# Patient Record
Sex: FEMALE | Race: ASIAN | NOT HISPANIC OR LATINO | URBAN - METROPOLITAN AREA
[De-identification: names, ages, dates, MRNs, and addresses within clinical notes are randomized per-mention and may not be internally consistent; named-entity substitution may affect disease eponyms.]

---

## 2019-10-01 ENCOUNTER — OUTPATIENT (OUTPATIENT)
Dept: OUTPATIENT SERVICES | Facility: HOSPITAL | Age: 64
LOS: 1 days | End: 2019-10-01
Payer: MEDICAID

## 2019-10-20 ENCOUNTER — INPATIENT (INPATIENT)
Facility: HOSPITAL | Age: 64
LOS: 18 days | Discharge: HOME | DRG: 216 | End: 2019-11-08
Attending: THORACIC SURGERY (CARDIOTHORACIC VASCULAR SURGERY) | Admitting: HOSPITALIST
Payer: MEDICAID

## 2019-10-20 VITALS
HEART RATE: 120 BPM | DIASTOLIC BLOOD PRESSURE: 97 MMHG | SYSTOLIC BLOOD PRESSURE: 157 MMHG | OXYGEN SATURATION: 97 % | RESPIRATION RATE: 20 BRPM

## 2019-10-20 DIAGNOSIS — E11.9 TYPE 2 DIABETES MELLITUS WITHOUT COMPLICATIONS: ICD-10-CM

## 2019-10-20 DIAGNOSIS — I10 ESSENTIAL (PRIMARY) HYPERTENSION: ICD-10-CM

## 2019-10-20 DIAGNOSIS — I21.4 NON-ST ELEVATION (NSTEMI) MYOCARDIAL INFARCTION: ICD-10-CM

## 2019-10-20 DIAGNOSIS — I63.9 CEREBRAL INFARCTION, UNSPECIFIED: ICD-10-CM

## 2019-10-20 DIAGNOSIS — Z79.899 OTHER LONG TERM (CURRENT) DRUG THERAPY: ICD-10-CM

## 2019-10-20 DIAGNOSIS — E78.5 HYPERLIPIDEMIA, UNSPECIFIED: ICD-10-CM

## 2019-10-20 DIAGNOSIS — Z29.9 ENCOUNTER FOR PROPHYLACTIC MEASURES, UNSPECIFIED: ICD-10-CM

## 2019-10-20 LAB
ALBUMIN SERPL ELPH-MCNC: 3.5 G/DL — SIGNIFICANT CHANGE UP (ref 3.3–5)
ALP SERPL-CCNC: 85 U/L — SIGNIFICANT CHANGE UP (ref 40–120)
ALT FLD-CCNC: 22 U/L — SIGNIFICANT CHANGE UP (ref 10–45)
ANION GAP SERPL CALC-SCNC: 16 MMOL/L — SIGNIFICANT CHANGE UP (ref 5–17)
ANION GAP SERPL CALC-SCNC: 16 MMOL/L — SIGNIFICANT CHANGE UP (ref 5–17)
APTT BLD: 35.3 SEC — SIGNIFICANT CHANGE UP (ref 27.5–36.3)
APTT BLD: 57.4 SEC — HIGH (ref 27.5–36.3)
APTT BLD: 65.9 SEC — HIGH (ref 27.5–36.3)
APTT BLD: 69.8 SEC — HIGH (ref 27.5–36.3)
AST SERPL-CCNC: 46 U/L — HIGH (ref 10–40)
BASE EXCESS BLDV CALC-SCNC: 0.1 MMOL/L — SIGNIFICANT CHANGE UP (ref -2–2)
BASOPHILS # BLD AUTO: 0.04 K/UL — SIGNIFICANT CHANGE UP (ref 0–0.2)
BASOPHILS NFR BLD AUTO: 0.4 % — SIGNIFICANT CHANGE UP (ref 0–2)
BILIRUB SERPL-MCNC: 0.4 MG/DL — SIGNIFICANT CHANGE UP (ref 0.2–1.2)
BUN SERPL-MCNC: 12 MG/DL — SIGNIFICANT CHANGE UP (ref 7–23)
BUN SERPL-MCNC: 13 MG/DL — SIGNIFICANT CHANGE UP (ref 7–23)
CA-I SERPL-SCNC: 1.07 MMOL/L — LOW (ref 1.12–1.3)
CALCIUM SERPL-MCNC: 8.4 MG/DL — SIGNIFICANT CHANGE UP (ref 8.4–10.5)
CALCIUM SERPL-MCNC: 8.9 MG/DL — SIGNIFICANT CHANGE UP (ref 8.4–10.5)
CHLORIDE BLDV-SCNC: 107 MMOL/L — SIGNIFICANT CHANGE UP (ref 96–108)
CHLORIDE SERPL-SCNC: 102 MMOL/L — SIGNIFICANT CHANGE UP (ref 96–108)
CHLORIDE SERPL-SCNC: 106 MMOL/L — SIGNIFICANT CHANGE UP (ref 96–108)
CK MB BLD-MCNC: 7.4 % — HIGH (ref 0–3.5)
CK MB CFR SERPL CALC: 13.1 NG/ML — HIGH (ref 0–3.8)
CK SERPL-CCNC: 176 U/L — HIGH (ref 25–170)
CO2 BLDV-SCNC: 24 MMOL/L — SIGNIFICANT CHANGE UP (ref 22–30)
CO2 SERPL-SCNC: 20 MMOL/L — LOW (ref 22–31)
CO2 SERPL-SCNC: 21 MMOL/L — LOW (ref 22–31)
CREAT SERPL-MCNC: 0.6 MG/DL — SIGNIFICANT CHANGE UP (ref 0.5–1.3)
CREAT SERPL-MCNC: 0.68 MG/DL — SIGNIFICANT CHANGE UP (ref 0.5–1.3)
EOSINOPHIL # BLD AUTO: 0.11 K/UL — SIGNIFICANT CHANGE UP (ref 0–0.5)
EOSINOPHIL NFR BLD AUTO: 1 % — SIGNIFICANT CHANGE UP (ref 0–6)
GAS PNL BLDV: 136 MMOL/L — SIGNIFICANT CHANGE UP (ref 135–145)
GAS PNL BLDV: SIGNIFICANT CHANGE UP
GAS PNL BLDV: SIGNIFICANT CHANGE UP
GLUCOSE BLDC GLUCOMTR-MCNC: 136 MG/DL — HIGH (ref 70–99)
GLUCOSE BLDC GLUCOMTR-MCNC: 190 MG/DL — HIGH (ref 70–99)
GLUCOSE BLDC GLUCOMTR-MCNC: 216 MG/DL — HIGH (ref 70–99)
GLUCOSE BLDC GLUCOMTR-MCNC: 219 MG/DL — HIGH (ref 70–99)
GLUCOSE BLDC GLUCOMTR-MCNC: 90 MG/DL — SIGNIFICANT CHANGE UP (ref 70–99)
GLUCOSE BLDV-MCNC: 201 MG/DL — HIGH (ref 70–99)
GLUCOSE SERPL-MCNC: 164 MG/DL — HIGH (ref 70–99)
GLUCOSE SERPL-MCNC: 208 MG/DL — HIGH (ref 70–99)
HBA1C BLD-MCNC: 7.5 % — HIGH (ref 4–5.6)
HCO3 BLDV-SCNC: 23 MMOL/L — SIGNIFICANT CHANGE UP (ref 21–29)
HCT VFR BLD CALC: 32.6 % — LOW (ref 34.5–45)
HCT VFR BLD CALC: 34.4 % — LOW (ref 34.5–45)
HCT VFR BLD CALC: 35.8 % — SIGNIFICANT CHANGE UP (ref 34.5–45)
HCT VFR BLDA CALC: 37 % — LOW (ref 39–50)
HGB BLD CALC-MCNC: 12 G/DL — SIGNIFICANT CHANGE UP (ref 11.5–15.5)
HGB BLD-MCNC: 10.5 G/DL — LOW (ref 11.5–15.5)
HGB BLD-MCNC: 11.3 G/DL — LOW (ref 11.5–15.5)
HGB BLD-MCNC: 11.5 G/DL — SIGNIFICANT CHANGE UP (ref 11.5–15.5)
IMM GRANULOCYTES NFR BLD AUTO: 0.4 % — SIGNIFICANT CHANGE UP (ref 0–1.5)
INR BLD: 1.19 RATIO — HIGH (ref 0.88–1.16)
LACTATE BLDV-MCNC: 2.5 MMOL/L — HIGH (ref 0.7–2)
LIDOCAIN IGE QN: 31 U/L — SIGNIFICANT CHANGE UP (ref 7–60)
LYMPHOCYTES # BLD AUTO: 2.14 K/UL — SIGNIFICANT CHANGE UP (ref 1–3.3)
LYMPHOCYTES # BLD AUTO: 20.4 % — SIGNIFICANT CHANGE UP (ref 13–44)
MCHC RBC-ENTMCNC: 25.3 PG — LOW (ref 27–34)
MCHC RBC-ENTMCNC: 25.4 PG — LOW (ref 27–34)
MCHC RBC-ENTMCNC: 25.5 PG — LOW (ref 27–34)
MCHC RBC-ENTMCNC: 32.1 GM/DL — SIGNIFICANT CHANGE UP (ref 32–36)
MCHC RBC-ENTMCNC: 32.2 GM/DL — SIGNIFICANT CHANGE UP (ref 32–36)
MCHC RBC-ENTMCNC: 32.8 GM/DL — SIGNIFICANT CHANGE UP (ref 32–36)
MCV RBC AUTO: 77.5 FL — LOW (ref 80–100)
MCV RBC AUTO: 78.7 FL — LOW (ref 80–100)
MCV RBC AUTO: 78.7 FL — LOW (ref 80–100)
MONOCYTES # BLD AUTO: 0.57 K/UL — SIGNIFICANT CHANGE UP (ref 0–0.9)
MONOCYTES NFR BLD AUTO: 5.4 % — SIGNIFICANT CHANGE UP (ref 2–14)
NEUTROPHILS # BLD AUTO: 7.61 K/UL — HIGH (ref 1.8–7.4)
NEUTROPHILS NFR BLD AUTO: 72.4 % — SIGNIFICANT CHANGE UP (ref 43–77)
NRBC # BLD: 0 /100 WBCS — SIGNIFICANT CHANGE UP (ref 0–0)
NT-PROBNP SERPL-SCNC: 5799 PG/ML — HIGH (ref 0–300)
PCO2 BLDV: 35 MMHG — SIGNIFICANT CHANGE UP (ref 35–50)
PH BLDV: 7.44 — SIGNIFICANT CHANGE UP (ref 7.35–7.45)
PLATELET # BLD AUTO: 389 K/UL — SIGNIFICANT CHANGE UP (ref 150–400)
PLATELET # BLD AUTO: 403 K/UL — HIGH (ref 150–400)
PLATELET # BLD AUTO: 419 K/UL — HIGH (ref 150–400)
PO2 BLDV: 31 MMHG — SIGNIFICANT CHANGE UP (ref 25–45)
POTASSIUM BLDV-SCNC: 5.8 MMOL/L — HIGH (ref 3.5–5.3)
POTASSIUM SERPL-MCNC: 4 MMOL/L — SIGNIFICANT CHANGE UP (ref 3.5–5.3)
POTASSIUM SERPL-MCNC: 5.6 MMOL/L — HIGH (ref 3.5–5.3)
POTASSIUM SERPL-SCNC: 4 MMOL/L — SIGNIFICANT CHANGE UP (ref 3.5–5.3)
POTASSIUM SERPL-SCNC: 5.6 MMOL/L — HIGH (ref 3.5–5.3)
PROT SERPL-MCNC: 7.7 G/DL — SIGNIFICANT CHANGE UP (ref 6–8.3)
PROTHROM AB SERPL-ACNC: 13.6 SEC — HIGH (ref 10–12.9)
RBC # BLD: 4.14 M/UL — SIGNIFICANT CHANGE UP (ref 3.8–5.2)
RBC # BLD: 4.44 M/UL — SIGNIFICANT CHANGE UP (ref 3.8–5.2)
RBC # BLD: 4.55 M/UL — SIGNIFICANT CHANGE UP (ref 3.8–5.2)
RBC # FLD: 13.7 % — SIGNIFICANT CHANGE UP (ref 10.3–14.5)
RBC # FLD: 13.7 % — SIGNIFICANT CHANGE UP (ref 10.3–14.5)
RBC # FLD: 13.8 % — SIGNIFICANT CHANGE UP (ref 10.3–14.5)
SAO2 % BLDV: 53 % — LOW (ref 67–88)
SODIUM SERPL-SCNC: 139 MMOL/L — SIGNIFICANT CHANGE UP (ref 135–145)
SODIUM SERPL-SCNC: 142 MMOL/L — SIGNIFICANT CHANGE UP (ref 135–145)
TROPONIN T, HIGH SENSITIVITY RESULT: 1196 NG/L — HIGH (ref 0–51)
TROPONIN T, HIGH SENSITIVITY RESULT: 1340 NG/L — HIGH (ref 0–51)
WBC # BLD: 10.51 K/UL — HIGH (ref 3.8–10.5)
WBC # BLD: 9.29 K/UL — SIGNIFICANT CHANGE UP (ref 3.8–10.5)
WBC # BLD: 9.68 K/UL — SIGNIFICANT CHANGE UP (ref 3.8–10.5)
WBC # FLD AUTO: 10.51 K/UL — HIGH (ref 3.8–10.5)
WBC # FLD AUTO: 9.29 K/UL — SIGNIFICANT CHANGE UP (ref 3.8–10.5)
WBC # FLD AUTO: 9.68 K/UL — SIGNIFICANT CHANGE UP (ref 3.8–10.5)

## 2019-10-20 PROCEDURE — 99223 1ST HOSP IP/OBS HIGH 75: CPT | Mod: GC

## 2019-10-20 PROCEDURE — 71045 X-RAY EXAM CHEST 1 VIEW: CPT | Mod: 26

## 2019-10-20 PROCEDURE — 12345: CPT | Mod: NC,GC

## 2019-10-20 PROCEDURE — 99291 CRITICAL CARE FIRST HOUR: CPT

## 2019-10-20 RX ORDER — INSULIN LISPRO 100/ML
VIAL (ML) SUBCUTANEOUS
Refills: 0 | Status: DISCONTINUED | OUTPATIENT
Start: 2019-10-20 | End: 2019-10-25

## 2019-10-20 RX ORDER — GLUCAGON INJECTION, SOLUTION 0.5 MG/.1ML
1 INJECTION, SOLUTION SUBCUTANEOUS ONCE
Refills: 0 | Status: DISCONTINUED | OUTPATIENT
Start: 2019-10-20 | End: 2019-10-25

## 2019-10-20 RX ORDER — ATORVASTATIN CALCIUM 80 MG/1
80 TABLET, FILM COATED ORAL AT BEDTIME
Refills: 0 | Status: DISCONTINUED | OUTPATIENT
Start: 2019-10-20 | End: 2019-10-25

## 2019-10-20 RX ORDER — INSULIN LISPRO 100/ML
VIAL (ML) SUBCUTANEOUS AT BEDTIME
Refills: 0 | Status: DISCONTINUED | OUTPATIENT
Start: 2019-10-20 | End: 2019-10-25

## 2019-10-20 RX ORDER — HEPARIN SODIUM 5000 [USP'U]/ML
3800 INJECTION INTRAVENOUS; SUBCUTANEOUS EVERY 6 HOURS
Refills: 0 | Status: DISCONTINUED | OUTPATIENT
Start: 2019-10-20 | End: 2019-10-21

## 2019-10-20 RX ORDER — TICAGRELOR 90 MG/1
90 TABLET ORAL EVERY 12 HOURS
Refills: 0 | Status: DISCONTINUED | OUTPATIENT
Start: 2019-10-20 | End: 2019-10-22

## 2019-10-20 RX ORDER — SODIUM CHLORIDE 9 MG/ML
1000 INJECTION, SOLUTION INTRAVENOUS
Refills: 0 | Status: DISCONTINUED | OUTPATIENT
Start: 2019-10-20 | End: 2019-10-25

## 2019-10-20 RX ORDER — DEXTROSE 50 % IN WATER 50 %
25 SYRINGE (ML) INTRAVENOUS ONCE
Refills: 0 | Status: DISCONTINUED | OUTPATIENT
Start: 2019-10-20 | End: 2019-10-25

## 2019-10-20 RX ORDER — DEXTROSE 50 % IN WATER 50 %
12.5 SYRINGE (ML) INTRAVENOUS ONCE
Refills: 0 | Status: DISCONTINUED | OUTPATIENT
Start: 2019-10-20 | End: 2019-10-25

## 2019-10-20 RX ORDER — ACETAMINOPHEN 500 MG
650 TABLET ORAL ONCE
Refills: 0 | Status: COMPLETED | OUTPATIENT
Start: 2019-10-20 | End: 2019-10-20

## 2019-10-20 RX ORDER — NITROGLYCERIN 6.5 MG
25 CAPSULE, EXTENDED RELEASE ORAL
Qty: 50 | Refills: 0 | Status: DISCONTINUED | OUTPATIENT
Start: 2019-10-20 | End: 2019-10-20

## 2019-10-20 RX ORDER — ASPIRIN/CALCIUM CARB/MAGNESIUM 324 MG
81 TABLET ORAL DAILY
Refills: 0 | Status: DISCONTINUED | OUTPATIENT
Start: 2019-10-20 | End: 2019-10-25

## 2019-10-20 RX ORDER — HEPARIN SODIUM 5000 [USP'U]/ML
INJECTION INTRAVENOUS; SUBCUTANEOUS
Qty: 25000 | Refills: 0 | Status: DISCONTINUED | OUTPATIENT
Start: 2019-10-20 | End: 2019-10-21

## 2019-10-20 RX ORDER — DEXTROSE 50 % IN WATER 50 %
15 SYRINGE (ML) INTRAVENOUS ONCE
Refills: 0 | Status: DISCONTINUED | OUTPATIENT
Start: 2019-10-20 | End: 2019-10-25

## 2019-10-20 RX ORDER — LISINOPRIL 2.5 MG/1
10 TABLET ORAL DAILY
Refills: 0 | Status: DISCONTINUED | OUTPATIENT
Start: 2019-10-20 | End: 2019-10-24

## 2019-10-20 RX ORDER — INFLUENZA VIRUS VACCINE 15; 15; 15; 15 UG/.5ML; UG/.5ML; UG/.5ML; UG/.5ML
0.5 SUSPENSION INTRAMUSCULAR ONCE
Refills: 0 | Status: DISCONTINUED | OUTPATIENT
Start: 2019-10-20 | End: 2019-10-26

## 2019-10-20 RX ADMIN — HEPARIN SODIUM 950 UNIT(S)/HR: 5000 INJECTION INTRAVENOUS; SUBCUTANEOUS at 11:07

## 2019-10-20 RX ADMIN — LISINOPRIL 10 MILLIGRAM(S): 2.5 TABLET ORAL at 08:57

## 2019-10-20 RX ADMIN — HEPARIN SODIUM 750 UNIT(S)/HR: 5000 INJECTION INTRAVENOUS; SUBCUTANEOUS at 03:44

## 2019-10-20 RX ADMIN — Medication 650 MILLIGRAM(S): at 22:31

## 2019-10-20 RX ADMIN — ATORVASTATIN CALCIUM 80 MILLIGRAM(S): 80 TABLET, FILM COATED ORAL at 21:00

## 2019-10-20 RX ADMIN — Medication 81 MILLIGRAM(S): at 11:08

## 2019-10-20 RX ADMIN — TICAGRELOR 90 MILLIGRAM(S): 90 TABLET ORAL at 21:00

## 2019-10-20 RX ADMIN — HEPARIN SODIUM 950 UNIT(S)/HR: 5000 INJECTION INTRAVENOUS; SUBCUTANEOUS at 17:46

## 2019-10-20 RX ADMIN — Medication 2: at 14:03

## 2019-10-20 RX ADMIN — Medication 650 MILLIGRAM(S): at 23:30

## 2019-10-20 RX ADMIN — Medication 15 MICROGRAM(S)/MIN: at 02:06

## 2019-10-20 NOTE — CONSULT NOTE ADULT - SUBJECTIVE AND OBJECTIVE BOX
HISTORY OF PRESENT ILLNESS:    62 YO M HLD, HTN, CVA with no residual deficits, epigastric pain and pressure at approximately 4PM. Pt. was on couch watching TV when symptoms started. Went to outside hospital. Was found to have ?ALAN in III. No hx of orthopnea or changes in exercise tolerance as reported by family. No nausea, vomiting or diaphoresis during episode. Pt. currently asymptomatic. Not in pain or having shortness of breath. in outside ED patinet received ASA, brilinta and heparin. Found to be hypertensive on arrival by EMS for transfer. Started on nitro gtt and transferred for further evaluation. pt. currently on 25mcg/min of nitro with -150 systolic.     Allergies    No Known Allergies    Intolerances    	    MEDICATIONS:  nitroglycerin  Infusion 25 MICROgram(s)/Min IV Continuous <Continuous>                  PAST MEDICAL & SURGICAL HISTORY:      FAMILY HISTORY:      SOCIAL HISTORY:    [x] Non-smoker  [ ] Smoker  [ ] Alcohol      REVIEW OF SYSTEMS:  General: no fatigue/malaise, weight loss/gain.  Skin: no rashes.  Ophthalmologic: no blurred vision, no loss of vision. 	  ENT: no sore throat, rhinorrhea, sinus congestion.  Respiratory: no SOB, cough or wheeze.  Gastrointestinal:  no N/V/D, no melena/hematemesis/hematochezia.  Genitourinary: no dysuria/hesitancy or hematuria.  Musculoskeletal: no myalgias or arthralgias.  Neurological: no changes in vision or hearing, no lightheadedness/dizziness, no syncope/near syncope	  Psychiatric: no unusual stress/anxiety.   Hematology/Lymphatics: no unusual bleeding, bruising and no lymphadenopathy.  Endocrine: no unusual thirst.   All others negative except as stated above and in HPI.    PHYSICAL EXAM:  T(C): 36.1 (10-20-19 @ 01:27), Max: 36.1 (10-20-19 @ 01:27)  HR: 110 (10-20-19 @ 02:36) (110 - 122)  BP: 130/88 (10-20-19 @ 02:36) (129/95 - 157/97)  RR: 28 (10-20-19 @ 02:36) (20 - 28)  SpO2: 100% (10-20-19 @ 02:36) (97% - 100%) on RA.   Wt(kg): --  I&O's Summary      Appearance: Normal	  HEENT:   Normal oral mucosa, PERRL, EOMI	  Lymphatic: No lymphadenopathy  Cardiovascular: Tachyardic, S1 S2, No JVD, No murmurs, No edema  Respiratory: decreased breath sounds to mid lung fields posteriorly bilaterally. Crackles heard anteriorly.   Psychiatry: A & O x 3, Mood & affect appropriate  Gastrointestinal:  Soft, Non-tender, + BS	  Skin: No rashes, No ecchymoses, No cyanosis	  Neurologic: Non-focal  Extremities: Normal range of motion, No clubbing, cyanosis or edema  Vascular: Peripheral pulses palpable 2+ bilaterally        LABS:	 	    CBC Full  -  ( 20 Oct 2019 01:50 )  WBC Count : 10.51 K/uL  Hemoglobin : 11.5 g/dL  Hematocrit : 35.8 %  Platelet Count - Automated : 419 K/uL  Mean Cell Volume : 78.7 fl  Mean Cell Hemoglobin : 25.3 pg  Mean Cell Hemoglobin Concentration : 32.1 gm/dL  Auto Neutrophil # : 7.61 K/uL  Auto Lymphocyte # : 2.14 K/uL  Auto Monocyte # : 0.57 K/uL  Auto Eosinophil # : 0.11 K/uL  Auto Basophil # : 0.04 K/uL  Auto Neutrophil % : 72.4 %  Auto Lymphocyte % : 20.4 %  Auto Monocyte % : 5.4 %  Auto Eosinophil % : 1.0 %  Auto Basophil % : 0.4 %    10-20    139  |  102  |  13  ----------------------------<  208<H>  5.6<H>   |  21<L>  |  0.60    Ca    8.9      20 Oct 2019 01:50    TPro  7.7  /  Alb  3.5  /  TBili  0.4  /  DBili  x   /  AST  46<H>  /  ALT  22  /  AlkPhos  85  10-20      proBNP: Serum Pro-Brain Natriuretic Peptide: 5799 pg/mL (10-20 @ 01:50)    Lipid Profile:   HgA1c:   TSH:       CARDIAC MARKERS:  Troponin T, High Sensitivity (10.20.19 @ 01:50)    Troponin T, High Sensitivity Result: 1340: Severe Hemolysis, Troponin T results may be falsely decreased.....  Rapid upward or downward changes in high-sensitivity troponin levels  suggest acute myocardial injury. Renal impairment may cause sustained  troponin elevations.  Normal: <6 - 14 ng/L  Indeterminate: 15-51 ng/L  Elevated: > 51 ng/L  See [email or web address] on the [facility] intranet for more  information ng/L          TELEMETRY: 	    ECG:  Sinus tachycardia with possible ST elevation in III on repeat from faxed transfer center resolved.   No EKG in chart from [facility].   RADIOLOGY:  Bilateral pleural effusions    OTHER: 	    PREVIOUS DIAGNOSTIC TESTING:    [ ] Echocardiogram:      [ ]  Catheterization:  [ ] Stress Test:  	  	  ASSESSMENT/PLAN: 	  62 YO F with HTN, HLD, CVA without residual deficits presented with sudden onset epigastric pain at 4 pm thas has since resolved spontaneously. Pt. has been having shortness of breath worse over past few days. No swelling. On Exam tachycardic, hypertensive, warm without peripheral edema. Has elevated JVP 3cm above clavicle at 45 degrees. Decreased breath sounds on exam. Found to have bilateral pleural effusions. Troponin 1340, BNP, 5799.     Please repeat EKG  Pt. now off bipap  Echo for wall motion and EF    Low to moderate risk DI Score, and given clinically improving and chest pain free, acceptable to monitor.   Loaded with ASA, Brilinta and heparin  Would continue ASA 81mg daily, Brilinta 90mg BID  Monitor on tele  Please trend CKMB   Cardiology to follow      Rommel Rollins  Cardiology Fellow  [phone]  All Cardiology service information can be found 24/7 on [email or web address], password: [email or web address] HISTORY OF PRESENT ILLNESS:    64 YO M HLD, HTN, CVA with no residual deficits, epigastric pain and pressure at approximately 4PM. Pt. was on couch watching TV when symptoms started. Went to outside hospital. Was found to have ?ALAN in III. No hx of orthopnea or changes in exercise tolerance as reported by family. No nausea, vomiting or diaphoresis during episode. Pt. currently asymptomatic. Not in pain or having shortness of breath. in outside ED patinet received ASA, brilinta and heparin. Found to be hypertensive on arrival by EMS for transfer. Started on nitro gtt and transferred for further evaluation. pt. currently on 25mcg/min of nitro with -150 systolic.     Allergies    No Known Allergies    Intolerances    	    MEDICATIONS:  nitroglycerin  Infusion 25 MICROgram(s)/Min IV Continuous <Continuous>                  PAST MEDICAL & SURGICAL HISTORY:  HTN  HL      FAMILY HISTORY:  Mother-HTN    SOCIAL HISTORY:    [x] Non-smoker  [ ] Smoker  [ ] Alcohol      REVIEW OF SYSTEMS:  General: no fatigue/malaise, weight loss/gain.  Skin: no rashes.  Ophthalmologic: no blurred vision, no loss of vision. 	  ENT: no sore throat, rhinorrhea, sinus congestion.  Respiratory: no SOB, cough or wheeze.  Gastrointestinal:  no N/V/D, no melena/hematemesis/hematochezia.  Genitourinary: no dysuria/hesitancy or hematuria.  Musculoskeletal: no myalgias or arthralgias.  Neurological: no changes in vision or hearing, no lightheadedness/dizziness, no syncope/near syncope	  Psychiatric: no unusual stress/anxiety.   Hematology/Lymphatics: no unusual bleeding, bruising and no lymphadenopathy.  Endocrine: no unusual thirst.   All others negative except as stated above and in HPI.    PHYSICAL EXAM:  T(C): 36.1 (10-20-19 @ 01:27), Max: 36.1 (10-20-19 @ 01:27)  HR: 110 (10-20-19 @ 02:36) (110 - 122)  BP: 130/88 (10-20-19 @ 02:36) (129/95 - 157/97)  RR: 28 (10-20-19 @ 02:36) (20 - 28)  SpO2: 100% (10-20-19 @ 02:36) (97% - 100%) on RA.   Wt(kg): --  I&O's Summary      Appearance: Normal	  HEENT:   Normal oral mucosa, PERRL, EOMI	  Lymphatic: No lymphadenopathy  Cardiovascular: Tachyardic, S1 S2, No JVD, No murmurs, No edema  Respiratory: decreased breath sounds to mid lung fields posteriorly bilaterally. Crackles heard anteriorly.   Psychiatry: A & O x 3, Mood & affect appropriate  Gastrointestinal:  Soft, Non-tender, + BS	  Skin: No rashes, No ecchymoses, No cyanosis	  Neurologic: Non-focal  Extremities: Normal range of motion, No clubbing, cyanosis or edema  Vascular: Peripheral pulses palpable 2+ bilaterally        LABS:	 Labs Personally Reviewed 10/20/2019  	    CBC Full  -  ( 20 Oct 2019 01:50 )  WBC Count : 10.51 K/uL  Hemoglobin : 11.5 g/dL  Hematocrit : 35.8 %  Platelet Count - Automated : 419 K/uL  Mean Cell Volume : 78.7 fl  Mean Cell Hemoglobin : 25.3 pg  Mean Cell Hemoglobin Concentration : 32.1 gm/dL  Auto Neutrophil # : 7.61 K/uL  Auto Lymphocyte # : 2.14 K/uL  Auto Monocyte # : 0.57 K/uL  Auto Eosinophil # : 0.11 K/uL  Auto Basophil # : 0.04 K/uL  Auto Neutrophil % : 72.4 %  Auto Lymphocyte % : 20.4 %  Auto Monocyte % : 5.4 %  Auto Eosinophil % : 1.0 %  Auto Basophil % : 0.4 %    10-20    139  |  102  |  13  ----------------------------<  208<H>  5.6<H>   |  21<L>  |  0.60    Ca    8.9      20 Oct 2019 01:50    TPro  7.7  /  Alb  3.5  /  TBili  0.4  /  DBili  x   /  AST  46<H>  /  ALT  22  /  AlkPhos  85  10-20      proBNP: Serum Pro-Brain Natriuretic Peptide: 5799 pg/mL (10-20 @ 01:50)    Lipid Profile:   HgA1c:   TSH:       CARDIAC MARKERS:  Troponin T, High Sensitivity (10.20.19 @ 01:50)    Troponin T, High Sensitivity Result: 1340: Severe Hemolysis, Troponin T results may be falsely decreased.....  Rapid upward or downward changes in high-sensitivity troponin levels  suggest acute myocardial injury. Renal impairment may cause sustained  troponin elevations.  Normal: <6 - 14 ng/L  Indeterminate: 15-51 ng/L  Elevated: > 51 ng/L  See [email or web address] on the [facility] intranet for more  information ng/L          TELEMETRY: 	    ECG:  Sinus tachycardia with possible ST elevation in III on repeat from faxed transfer center resolved.   No EKG in chart from [facility].   RADIOLOGY:  Bilateral pleural effusions    OTHER: 	    PREVIOUS DIAGNOSTIC TESTING:    [ ] Echocardiogram:      [ ]  Catheterization:  [ ] Stress Test:  	  	  ASSESSMENT/PLAN: 	  64 YO F with HTN, HLD, CVA without residual deficits presented with sudden onset epigastric pain at 4 pm thas has since resolved spontaneously. Pt. has been having shortness of breath worse over past few days. No swelling. On Exam tachycardic, hypertensive, warm without peripheral edema. Has elevated JVP 3cm above clavicle at 45 degrees. Decreased breath sounds on exam. Found to have bilateral pleural effusions. Troponin 1340, BNP, 5799.     Please repeat EKG  Pt. now off bipap  Echo for wall motion and EF    Low to moderate risk DI Score, and given clinically improving and chest pain free, acceptable to monitor.   Loaded with ASA, Brilinta and heparin  Would continue ASA 81mg daily, Brilinta 90mg BID  Monitor on tele  Please trend CKMB   Cardiology to follow      Rommel Rollins  Cardiology Fellow  [phone]  All Cardiology service information can be found 24/7 on [email or web address], password: [email or web address]

## 2019-10-20 NOTE — ED PROVIDER NOTE - CARE PLAN
Principal Discharge DX:	NSTEMI (non-ST elevated myocardial infarction)  Secondary Diagnosis:	Pulmonary edema  Secondary Diagnosis:	Pleural effusion

## 2019-10-20 NOTE — H&P ADULT - NSHPREVIEWOFSYSTEMS_GEN_ALL_CORE
REVIEW OF SYSTEMS:    CONSTITUTIONAL: No weakness, fevers or chills  EYES/ENT: No visual changes;  No vertigo or throat pain   NECK: No pain or stiffness  RESPIRATORY: +Shortness of breath; mild orthopnea; No cough, wheezing, hemoptysis; No shortness of breath  CARDIOVASCULAR: No chest pain or palpitations  GASTROINTESTINAL: + abdominal pain epigastric pain. No nausea, vomiting, or hematemesis; No diarrhea or constipation. No melena or hematochezia.  GENITOURINARY: No dysuria, frequency or hematuria  NEUROLOGICAL: No numbness or weakness  SKIN: No itching, rashes CONSTITUTIONAL: No weakness, fevers or chills  EYES/ENT: No visual changes;  No vertigo or throat pain   NECK: No pain or stiffness  RESPIRATORY: +Shortness of breath; mild orthopnea; No cough, wheezing, hemoptysis; No shortness of breath  CARDIOVASCULAR: No chest pain or palpitations  GASTROINTESTINAL: + abdominal pain epigastric pain. No nausea, vomiting, or hematemesis; No diarrhea or constipation. No melena or hematochezia.  GENITOURINARY: No dysuria, frequency or hematuria  NEUROLOGICAL: No numbness or weakness  SKIN: No itching, rashes  Heme/Onc: no purpura, petechiae or night sweats  Skin: no pruritus, hair loss or skin lesions    Psych: no depression, changes in sleep, changes in concentration, or lack of energy

## 2019-10-20 NOTE — H&P ADULT - NSHPLABSRESULTS_GEN_ALL_CORE
The Labs were reviewed by me   The Radiology was reviewed by me    EKG tracing reviewed by me    10-20    139  |  102  |  13  ----------------------------<  208<H>  5.6<H>   |  21<L>  |  0.60    Ca    8.9      20 Oct 2019 01:50    TPro  7.7  /  Alb  3.5  /  TBili  0.4  /  DBili  x   /  AST  46<H>  /  ALT  22  /  AlkPhos  85  10-20          PT/INR - ( 20 Oct 2019 01:50 )   PT: 13.6 sec;   INR: 1.19 ratio         PTT - ( 20 Oct 2019 01:50 )  PTT:65.9 sec                                        11.5   10.51 )-----------( 419      ( 20 Oct 2019 01:50 )             35.8     CAPILLARY BLOOD GLUCOSE        Blood Gas Source Venous: Venous (10-20-19 @ 01:50)

## 2019-10-20 NOTE — H&P ADULT - PROBLEM SELECTOR PLAN 7
Diet: DASH/TLD with consistent carb  DVT Prophylaxis: Heparin gtt  Dispo: PT consulted    Leann Gordon  PGY-2 Internal Medicine  Pager: [phone] (NS)/[phone] (MOSES)

## 2019-10-20 NOTE — PROGRESS NOTE ADULT - PROBLEM SELECTOR PLAN 5
Patient with hx of CVA (residual deficits: patient requiring a cane to ambulate) Jan 2019  - ASA  - Atorvastatin 80 mg qhs

## 2019-10-20 NOTE — PROGRESS NOTE ADULT - ATTENDING COMMENTS
NSTEMI on DAPT and heparin gtt, HSTrop trending down  no further chest pain, but still (+) dyspnea  appreciate card recs - pt will need LHC and TTE    Dina Cabrera MD  Division of Hospital Medicine  Pager: [phone]  Office: [phone] NSTEMI on DAPT and heparin gtt, HSTrop trending down  no further chest pain, but still (+) dyspnea, abdominal discomfort  pt with episode of fever/diarrhea few days PTA, now resolved - could be vital gastroenteritis   if abd pain persists, low threshold for CTAP to rule out colitis  appreciate card recs - pt will need LHC and TTE    Dina Cabrera MD  Division of Hospital Medicine  Pager: [phone]  Office: [phone]

## 2019-10-20 NOTE — H&P ADULT - PROBLEM SELECTOR PLAN 4
Patient with hx of CVA (residual deficits: patient requiring a cane to ambulate)  - ASA  - Atorvastatin 80 mg qhs Patient on Lisinopril and Amlodpine per outpatient medication review  - Restarted Lisinopril while patient in hospital.

## 2019-10-20 NOTE — H&P ADULT - HISTORY OF PRESENT ILLNESS
Pt is a 64 yo female with DM, HTN, and CVA (requiring cane to ambulate, but no focal deficits)who presented to [facility] with a several hour history of shortness of breath and abdominal pain. Patient states these symptoms started around 4 pmyesterday to this. Prior to this day, patient denies having any similar symptoms prior to yesterday. She had an EKG at OSh that showed ALAN in lead III and ST depressions in V2-V5. The troponing at the OSH was 1.15, nd propBNP of 4000. She was given lasix, ASA/Brillinta loaded, and started on heparin drip. She was transferred to [facility] for HLOC given concern for NSTEMI.

## 2019-10-20 NOTE — H&P ADULT - ASSESSMENT
64 YO F with HTN, HLD, CVA without residual deficits presented with sudden onset epigastric pain at 4 pm thas has since resolved spontaneously. Pt. has been having shortness of breath worse over past few days. No swelling. On Exam tachycardic, hypertensive, warm without peripheral edema. Has elevated JVP 3cm above clavicle at 45 degrees. Decreased breath sounds on exam. Found to have bilateral pleural effusions. Troponin 1340, BNP, 5799. 62 YO F with HTN, HLD, CVA (ambulating with came) presented to OSH with sudden onset abdominal pain at 4 pm, transferred to [facility] for HLOC for NSTEMI.

## 2019-10-20 NOTE — ED PROVIDER NOTE - CONSTITUTIONAL, MLM
normal... Awake, alert, oriented to person, place, time/situation and appears in mild respiratory distress.

## 2019-10-20 NOTE — H&P ADULT - PROBLEM SELECTOR PLAN 5
Patient and family unclear of medications. Med rec done from outpatient medication reconcile. Primary team to reach our to pharmacy and/or family for additional info. Patient with hx of CVA (residual deficits: patient requiring a cane to ambulate)  - ASA  - Atorvastatin 80 mg qhs

## 2019-10-20 NOTE — H&P ADULT - ATTENDING COMMENTS
Patient seen and examined at bedside with resident. Below are my findings and assessment:     63F with PMHx of ischemic CVA, HTN, and T2DM initially presented to [facility] with nausea, vomiting and epigastric pain. While she was there was found to be hypertensive associated with shortness of breath. She was reportedly given IV Lasix and started on nitroglycerin drip with BiPap. Patient transferred to [facility] for further evaluation and treatment for possible NSTEMI vs. STEMI (documented possible ALAN). Patient given heparin bolus, and loaded with aspirin and Brilinta. While at [facility] EKG does not show ST elevations. Cardiology evaluation noted and not a CCU candidate. When seen by me patient off Bipap and Nitroglycerin drip.    Labs/Imaging:  PLT: 410  K: 5.6 (hemolyzed)  Troponin: 1340  Pro-BNP: 5749  LA: 2.5  AST/ALT: 46/22    EKG personally reviewed by me: sinus tach 133  CXR personally reviewed by me: bilateral pleural effusion    Plan:    -Treat as NSTEMI with Aspirin, Brilinta, and Heparin drip  -Follow up with cardiology, tentative plans for cardiac cath on 10/21?  -TTE  -Continue with high dose statin & ACEI  -Daily weights  -Repeat BMP for hemolyzed hyperkalemia  -A1c and lipid panel for risk stratification  -Trend troponin until lateral/downtrending  -If develops pain again stat EKG   -Physical Therapy after NSTEMI treatment  -Monitor volume status to maintain euvolemia

## 2019-10-20 NOTE — ED ADULT NURSE REASSESSMENT NOTE - NS ED NURSE REASSESS COMMENT FT1
Cards removed bipap, patient O2 at 97% on room air, denies SOB. Family remains at bedside, awaiting cards recs. Remains on cardiac monitor.

## 2019-10-20 NOTE — ED ADULT NURSE NOTE - OBJECTIVE STATEMENT
63 year old Female PMH: CHF, DM, HTN, HLD, CVA 2/2019- no deficits brought in by EMS as a transfer from [facility] for NSTEMI with elevated troponin. Patient has been experiencing nausea and vomiting throughout the week. On 10/19 around 1600 patient started to experience epigastric chest pain- went to [place] and was treated for an NSTEMI. As per EMS patient was given a total of 60 Lasix, a heparin bolus, aspirin, Brilinta, and a nitro drip at 25mcg/min by transferring facility. Patient arrived on BiPAP 10/5 40% FiO2. Left A.C. #20 by transfer hospital. Upon arrival to ED, patient awake, alert and oriented, in no acute distress, unlabored with equal chest rise and fall, saturation 100% on BiPAP, placed on cardiac monitor- sinus tachy 120s, abdomen soft and nondistended, motor and sensory intact. ED MD ordered for nitro drip to be increased to 50mcg/min at 0135. Right upper arm #20 IV placed with labs drawn and sent to lab. Patient undressed and placed into gown, call bell in hand and side rails up with bed in lowest position for safety. [place]ning provided. Comfort and safety provided. Patient educated to call for assistance. Family at bedside.

## 2019-10-20 NOTE — ED ADULT NURSE NOTE - NSIMPLEMENTINTERV_GEN_ALL_ED
Implemented All Fall with Harm Risk Interventions:  Bertha to call system. Call bell, personal items and telephone within reach. Instruct patient to call for assistance. Room bathroom lighting operational. Non-slip footwear when patient is off stretcher. Physically safe environment: no spills, clutter or unnecessary equipment. Stretcher in lowest position, wheels locked, appropriate side rails in place. Provide visual cue, wrist band, yellow gown, etc. Monitor gait and stability. Monitor for mental status changes and reorient to person, place, and time. Review medications for side effects contributing to fall risk. Reinforce activity limits and safety measures with patient and family. Provide visual clues: red socks.

## 2019-10-20 NOTE — ED ADULT NURSE REASSESSMENT NOTE - NS ED NURSE REASSESS COMMENT FT1
Heparin infusion started per MD order. Nitro infusion stopped per MD Mchugh. Patient remains off bipap, 97% on room air, denies SOB. Will continue to monitor.

## 2019-10-20 NOTE — CONSULT NOTE ADULT - ATTENDING COMMENTS
Agree with plan as outlined above.  Needs to be able to lie flat for LHC.  Treat for NSTEM  Plan for LHC if able to lie flat Mon  Discussed with patient and daughters.

## 2019-10-20 NOTE — PROGRESS NOTE ADULT - ASSESSMENT
64 YO F with HTN, HLD, CVA (jan 2019) presenting to OSH with sudden onset abdominal pain and SOB found to have NSTEMI transferred to [facility] for HLOC

## 2019-10-20 NOTE — ED PROVIDER NOTE - OBJECTIVE STATEMENT
62 yo F, w/ PMH of DM, HTN, CVA in February with residual R ankle weakness, BIBEMS from OSH d/t NSTEMI. Patient had been feeling unwell over the past 5 days, and was having nausea/vomiting/diarrhea. Around 4pm, began developing epigastric/chest pain, and it got worse around 19:00pm and began being associated with shortness of breath, so she called her daughter who brought her to OSH where rales were appreciated on lung exam, and patient was started on Lasix, 20mg and Lasix 40mg IV pushes, nitroglycerin gtt and bipap. Patient also had elevated troponin, with concern for NSTEMI, so patient was given Brillinta 180mg, Heparin 8000 units, and aspirin 324mg. Patient arrives to ED with mild shortness of breath, but feeling significantly better on Bipap. Endorses mild epigastric pain, but denies other complaints.     PMD: Dr. Marie Jones

## 2019-10-20 NOTE — ED ADULT NURSE REASSESSMENT NOTE - NS ED NURSE REASSESS COMMENT FT1
Received report from Deborah BRUNER, patient remains sinus tach, denies CP, remains on bipap for difficulty breathing, awaiting cards eval, updated on plan of care.

## 2019-10-20 NOTE — H&P ADULT - NSHPPHYSICALEXAM_GEN_ALL_CORE
Vital Signs Last 24 Hrs  T(C): 36.1 (20 Oct 2019 01:27), Max: 36.1 (20 Oct 2019 01:27)  T(F): 96.9 (20 Oct 2019 01:27), Max: 96.9 (20 Oct 2019 01:27)  HR: 110 (20 Oct 2019 04:12) (110 - 122)  BP: 135/91 (20 Oct 2019 04:12) (129/95 - 157/97)  BP(mean): --  RR: 26 (20 Oct 2019 04:12) (20 - 28)  SpO2: 97% (20 Oct 2019 04:12) (97% - 100%)    PHYSICAL EXAM:  GENERAL: NAD, well-developed  HEAD:  Atraumatic, Normocephalic  EYES: EOMI, PERRLA, conjunctiva and sclera clear  NECK: Supple, No JVD  CHEST/LUNG: Clear to auscultation bilaterally; No wheeze  HEART: Regular rate and rhythm; No murmurs, rubs, or gallops  ABDOMEN: Soft, Nontender, Nondistended; Bowel sounds present  EXTREMITIES:  2+ Peripheral Pulses, No clubbing, cyanosis, or edema  PSYCH: AAOx3  NEUROLOGY: non-focal  SKIN: No rashes or lesions Vital Signs Last 24 Hrs  T(C): 36.1 (20 Oct 2019 01:27), Max: 36.1 (20 Oct 2019 01:27)  T(F): 96.9 (20 Oct 2019 01:27), Max: 96.9 (20 Oct 2019 01:27)  HR: 110 (20 Oct 2019 04:12) (110 - 122)  BP: 135/91 (20 Oct 2019 04:12) (129/95 - 157/97)  BP(mean): --  RR: 26 (20 Oct 2019 04:12) (20 - 28)  SpO2: 97% (20 Oct 2019 04:12) (97% - 100%)    PHYSICAL EXAM:  GENERAL: NAD, well-developed  HEAD:  Atraumatic, Normocephalic  EYES: EOMI, PERRLA, conjunctiva and sclera clear  NECK: Supple, +JVD  CHEST/LUNG: diminished but clear breath sounds  HEART: Regular rate and rhythm; No murmurs, rubs, or gallops  ABDOMEN: Soft, Nontender, Nondistended; Bowel sounds present  EXTREMITIES:  2+ Peripheral Pulses, No clubbing, cyanosis, or edema  PSYCH: AAOx3  NEUROLOGY: non-focal  SKIN: No rashes or lesions Vital Signs Last 24 Hrs  T(C): 36.1 (20 Oct 2019 01:27), Max: 36.1 (20 Oct 2019 01:27)  T(F): 96.9 (20 Oct 2019 01:27), Max: 96.9 (20 Oct 2019 01:27)  HR: 110 (20 Oct 2019 04:12) (110 - 122)  BP: 135/91 (20 Oct 2019 04:12) (129/95 - 157/97)  BP(mean): --  RR: 26 (20 Oct 2019 04:12) (20 - 28)  SpO2: 97% (20 Oct 2019 04:12) (97% - 100%)    PHYSICAL EXAM:  GENERAL: NAD, well-developed  HEAD:  Atraumatic, Normocephalic  EYES: EOMI, PERRLA, conjunctiva and sclera clear  NECK: Supple, +JVD  CHEST/LUNG: diminished but clear breath sounds  HEART: Regular rate and rhythm; No murmurs, rubs, or gallops  ABDOMEN: Soft, Nontender, Nondistended; Bowel sounds present  EXTREMITIES:  2+ Peripheral Pulses, No clubbing, cyanosis, or edema  PSYCH: no SI/HI/AVH  NEUROLOGY: non-focal, AOx3  SKIN: No rashes or lesions

## 2019-10-20 NOTE — ED ADULT NURSE REASSESSMENT NOTE - NS ED NURSE REASSESS COMMENT FT1
MD Mchugh ordered for drip to be changed back to 25mch/min. Changed at 0207. Patient in no distress. Tolerating BiPAP with no distress. Patient has no complaints at this time. Family at bedside.

## 2019-10-20 NOTE — H&P ADULT - PROBLEM SELECTOR PLAN 1
Pt with ST segment changes on V2-V5, troponins 1340, BNP 5799,   - Cardiology consulted. Appreciate recs  - S/p ASA, brillinita, and heparin load  - C/w ASA 81 mg q Day  - C/w Brilinita  - C/w Heparin gtt  - Monitor on telemetry  - Trend cardiac enzymes  - Echo Pt with ST segment changes on V2-V5, troponins 1340, BNP 5799,   - Cardiology consulted. Appreciate recs  - S/p ASA, brillinita, and heparin load  - C/w ASA 81 mg q Day  - C/w Brilinita  - C/w Heparin gtt  - Monitor on telemetry  - Trend cardiac enzymes  - Echo  - Maintain euvolemia by dosing lasix daily  - Patient will likely need a beta blocker given the NSTEMI. However, given the elevated BNP and pleural effusions on CXR, would hold off on initiating beta blocker at this time (concern for decompensated HF) Pt with ST segment changes on V2-V5, troponins 1340, BNP 5799,   - Cardiology consulted. Appreciate recs  - S/p ASA, brillinita, and heparin load  - C/w ASA 81 mg q Day  - C/w Brilinita  - C/w Heparin gtt  - Monitor on telemetry  - Trend cardiac enzymes until downtrending  - Echo  - Maintain euvolemia by dosing lasix daily  - Patient will likely need a beta blocker given the NSTEMI. However, given the elevated BNP and pleural effusions on CXR, would hold off on initiating beta blocker at this time (concern for decompensated HF)

## 2019-10-20 NOTE — ED PROVIDER NOTE - CLINICAL SUMMARY MEDICAL DECISION MAKING FREE TEXT BOX
64 yo F, w/ PMH of DM, HTN, prvs CVA, who developed chest/epigastric pain this evening, and found at OSH to have NSTEMI and pulmonary edema, started on Bipap and nitro gtt and transferred to [facility] for further management. Will obtain labs, cxr, ekg, cardiology consultation, maintain , bipap as needed for breathing, treatment with heparin. Reassess. Will need admission. 62 yo F, w/ PMH of DM, HTN, prvs CVA, who developed chest/epigastric pain this evening, and found at OSH to have NSTEMI and pulmonary edema, started on Bipap and nitro gtt and transferred to [facility] for further management. Will obtain labs, cxr, ekg, cardiology consultation, maintain , bipap as needed for breathing, treatment with heparin. Reassess. Will need admission.  Shelby: 63 year old female with htn, dm, here transferred from OSH with NSTEMI and pulmnonary edema.  started onbippa nd nitro drip outpatient transfreed. patient still tachypneic and b/l crackles. will increase ntg drip, stay on bipap, ekg, cxr, c/w heparin. cards consult, reassess.

## 2019-10-20 NOTE — H&P ADULT - PROBLEM SELECTOR PLAN 6
Diet: DASH/TLD with consistent carb  DVT Prophylaxis: Heparin gtt  Dispo: PT consulted    Leann Gordon  PGY-2 Internal Medicine  Pager: [phone] (NS)/[phone] (MOSES) Patient and family unclear of medications. Med rec done from outpatient medication reconcile. Primary team to reach our to pharmacy and/or family for additional info.

## 2019-10-20 NOTE — PATIENT PROFILE ADULT - NSASFUNCLEVELADLTRANSFER_GEN_A_NUR
chief complaint : left face cellulitis        SUBJECTIVE / OVERNIGHT EVENTS: pt appears comfortable, denies chest pain, shortness of breath, nausea, vomiting facial pain     MEDICATIONS  (STANDING):  aspirin enteric coated 81 milliGRAM(s) Oral daily  atorvastatin 80 milliGRAM(s) Oral at bedtime  ciprofloxacin     Tablet 500 milliGRAM(s) Oral every 24 hours  ciprofloxacin/dexamethasone Suspension Otic 4 Drop(s) Left Ear four times a day  dextrose 5%. 1000 milliLiter(s) (50 mL/Hr) IV Continuous <Continuous>  dextrose 50% Injectable 12.5 Gram(s) IV Push once  dextrose 50% Injectable 25 Gram(s) IV Push once  dextrose 50% Injectable 25 Gram(s) IV Push once  doxycycline hyclate Capsule 100 milliGRAM(s) Oral every 12 hours  epoetin estelle Injectable 3000 Unit(s) IV Push <User Schedule>  insulin lispro (HumaLOG) corrective regimen sliding scale   SubCutaneous at bedtime  insulin lispro (HumaLOG) corrective regimen sliding scale   SubCutaneous three times a day before meals  lactobacillus acidophilus 1 Tablet(s) Oral every 12 hours  metoprolol 12.5 milliGRAM(s) Oral two times a day  Nephro-emil 1 Tablet(s) Oral daily  NIFEdipine XL 60 milliGRAM(s) Oral every 12 hours  pantoprazole    Tablet 40 milliGRAM(s) Oral before breakfast    MEDICATIONS  (PRN):  acetaminophen   Tablet. 650 milliGRAM(s) Oral every 6 hours PRN Mild Pain (1 - 3)  dextrose Gel 1 Dose(s) Oral once PRN Blood Glucose LESS THAN 70 milliGRAM(s)/deciliter  glucagon  Injectable 1 milliGRAM(s) IntraMuscular once PRN Glucose LESS THAN 70 milligrams/deciliter  oxyCODONE    IR 5 milliGRAM(s) Oral every 6 hours PRN moderate to severe pain      Vital Signs Last 24 Hrs  T(C): 36.6 (21 Oct 2017 22:33), Max: 37.4 (21 Oct 2017 18:23)  T(F): 97.9 (21 Oct 2017 22:33), Max: 99.4 (21 Oct 2017 18:23)  HR: 63 (21 Oct 2017 22:33) (58 - 88)  BP: 136/64 (21 Oct 2017 22:33) (99/59 - 141/69)  BP(mean): --  RR: 16 (21 Oct 2017 22:33) (16 - 18)  SpO2: 100% (21 Oct 2017 22:33) (99% - 100%)    Constitutional: No fever, fatigue  Skin: No rash.  Eyes: No recent vision problems or eye pain.  ENT: No congestion, ear pain, or sore throat.  Cardiovascular: No chest pain or palpation.  Respiratory: No cough, shortness of breath, congestion, or wheezing.  Gastrointestinal: No abdominal pain, nausea, vomiting, or diarrhea.  Genitourinary: No dysuria.  Musculoskeletal: No joint swelling.  Neurologic: No headache.    PHYSICAL EXAM:  GENERAL: NAD  EYES: EOMI, PERRLA  NECK: Supple, No JVD  dec left face swelling / erythema  CHEST/LUNG:   HEART:  S1 , S2 +  ABDOMEN: soft, bs+  EXTREMITIES:  trace edema  NEUROLOGY: alert awake     LABS:  10-21    138  |  99  |  17  ----------------------------<  155<H>  4.6   |  27  |  5.10<H>    Ca    7.8<L>      21 Oct 2017 06:00  Phos  3.1     10-  Mg     1.9     10-21    TPro  6.2  /  Alb  3.3  /  TBili  0.7  /  DBili      /  AST  23  /  ALT  8   /  AlkPhos  46  10-21    Creatinine Trend: 5.10 <--, 6.88 <--, 4.91 <--, 7.58 <--, 6.63 <--                        9.5    6.66  )-----------( 87       ( 21 Oct 2017 06:00 )             28.0     Urine Studies:  Urinalysis Basic - ( 19 Oct 2017 07:00 )    Color: YELLOW / Appearance: CLEAR / S.010 / pH: 8.5  Gluc: 250 / Ketone: NEGATIVE  / Bili: NEGATIVE / Urobili: NORMAL E.U.   Blood: NEGATIVE / Protein: 300 / Nitrite: NEGATIVE   Leuk Esterase: NEGATIVE / RBC: 0-2 / WBC 0-2   Sq Epi: OCC / Non Sq Epi:  / Bacteria:               LIVER FUNCTIONS - ( 21 Oct 2017 06:00 )  Alb: 3.3 g/dL / Pro: 6.2 g/dL / ALK PHOS: 46 u/L / ALT: 8 u/L / AST: 23 u/L / GGT: x 2 = assistive person

## 2019-10-20 NOTE — PROGRESS NOTE ADULT - PROBLEM SELECTOR PLAN 1
Pt with ST segment changes on V2-V5, troponins 1340, BNP 5799,   - Cardiology consulted. Appreciate recs  - S/p ASA, brillinita, and heparin load  - C/w ASA 81 mg q Day  - C/w Brilinita  - C/w Heparin gtt  - Monitor on telemetry  - Trend cardiac enzymes until downtrending  - Echo  - Maintain euvolemia by dosing lasix daily  - pleural effusions on CXR,   -holding off on beta blocker pending echo results (concern for decompensated HF)

## 2019-10-20 NOTE — ED PROVIDER NOTE - CRITICAL CARE PROVIDED
documentation/additional history taking/consult w/ pt's family directly relating to pts condition/interpretation of diagnostic studies/consultation with other physicians/direct patient care (not related to procedure)

## 2019-10-20 NOTE — PROGRESS NOTE ADULT - SUBJECTIVE AND OBJECTIVE BOX
***************************************************************  Nakul Wallace, PGY1  Internal Medicine   pager: [phone]  ***************************************************************    AICHA BILLY  63y  MRN: [id number]    Patient is a 63y old  Female who presents with a chief complaint of NSTEMI (20 Oct 2019 05:44)      Subjective: Still has some shortness of breath. No fevers, chest pain, nausea, vomiting or diarrhea.    MEDICATIONS  (STANDING):  aspirin enteric coated 81 milliGRAM(s) Oral daily  atorvastatin 80 milliGRAM(s) Oral at bedtime  dextrose 5%. 1000 milliLiter(s) (50 mL/Hr) IV Continuous <Continuous>  dextrose 50% Injectable 12.5 Gram(s) IV Push once  dextrose 50% Injectable 25 Gram(s) IV Push once  dextrose 50% Injectable 25 Gram(s) IV Push once  heparin  Infusion.  Unit(s)/Hr (7.5 mL/Hr) IV Continuous <Continuous>  insulin lispro (HumaLOG) corrective regimen sliding scale   SubCutaneous three times a day before meals  insulin lispro (HumaLOG) corrective regimen sliding scale   SubCutaneous at bedtime  lisinopril 10 milliGRAM(s) Oral daily  ticagrelor 90 milliGRAM(s) Oral every 12 hours    MEDICATIONS  (PRN):  dextrose 40% Gel 15 Gram(s) Oral once PRN Blood Glucose LESS THAN 70 milliGRAM(s)/deciliter  glucagon  Injectable 1 milliGRAM(s) IntraMuscular once PRN Glucose LESS THAN 70 milligrams/deciliter  heparin  Injectable 3800 Unit(s) IV Push every 6 hours PRN For aPTT less than 40      Objective:    Vitals: Vital Signs Last 24 Hrs  T(C): 36.7 (10-20-19 @ 07:11), Max: 36.7 (10-20-19 @ 07:11)  T(F): 98 (10-20-19 @ 07:11), Max: 98 (10-20-19 @ 07:11)  HR: 110 (10-20-19 @ 07:11) (107 - 122)  BP: 141/90 (10-20-19 @ 07:11) (129/95 - 157/97)  BP(mean): --  RR: 24 (10-20-19 @ 07:11) (20 - 28)  SpO2: 99% (10-20-19 @ 07:11) (97% - 100%)            I&O's Summary      PHYSICAL EXAM:  GENERAL: NAD  HEAD:  Atraumatic, Normocephalic  EYES: EOMI, conjunctiva and sclera clear  CHEST/LUNG: Clear to percussion bilaterally; No rales, rhonchi, wheezing, or rubs  HEART: Tachycardic; No murmurs, rubs, or gallops  ABDOMEN: Soft, nondistended. mild lower abdominal tenderness to deep palpation  SKIN: No rashes or lesions  NERVOUS SYSTEM:  Alert & Oriented X3    LABS:  10-20    142  |  106  |  12  ----------------------------<  164<H>  4.0   |  20<L>  |  0.68  10-20    139  |  102  |  13  ----------------------------<  208<H>  5.6<H>   |  21<L>  |  0.60    Ca    8.4      20 Oct 2019 06:18  Ca    8.9      20 Oct 2019 01:50    TPro  7.7  /  Alb  3.5  /  TBili  0.4  /  DBili  x   /  AST  46<H>  /  ALT  22  /  AlkPhos  85  10-20      PT/INR - ( 20 Oct 2019 01:50 )   PT: 13.6 sec;   INR: 1.19 ratio         PTT - ( 20 Oct 2019 01:50 )  PTT:65.9 sec                                        10.5   9.68  )-----------( 389      ( 20 Oct 2019 06:18 )             32.6                         11.5   10.51 )-----------( 419      ( 20 Oct 2019 01:50 )             35.8     CAPILLARY BLOOD GLUCOSE          RADIOLOGY & ADDITIONAL TESTS:    Imaging Personally Reviewed:  [x ] YES  [ ] NO    Consultants involved in case:   Consultant(s) Notes Reviewed:  [ x] YES  [ ] NO:   Care Discussed with Consultants/Other Providers [x ] YES  [ ] NO

## 2019-10-21 LAB
ANION GAP SERPL CALC-SCNC: 15 MMOL/L — SIGNIFICANT CHANGE UP (ref 5–17)
ANION GAP SERPL CALC-SCNC: 20 MMOL/L — HIGH (ref 5–17)
APTT BLD: 64.9 SEC — HIGH (ref 27.5–36.3)
APTT BLD: 66 SEC — HIGH (ref 27.5–36.3)
BASOPHILS # BLD AUTO: 0.03 K/UL — SIGNIFICANT CHANGE UP (ref 0–0.2)
BASOPHILS NFR BLD AUTO: 0.4 % — SIGNIFICANT CHANGE UP (ref 0–2)
BUN SERPL-MCNC: 19 MG/DL — SIGNIFICANT CHANGE UP (ref 7–23)
BUN SERPL-MCNC: 20 MG/DL — SIGNIFICANT CHANGE UP (ref 7–23)
CALCIUM SERPL-MCNC: 8.1 MG/DL — LOW (ref 8.4–10.5)
CALCIUM SERPL-MCNC: 8.5 MG/DL — SIGNIFICANT CHANGE UP (ref 8.4–10.5)
CHLORIDE SERPL-SCNC: 100 MMOL/L — SIGNIFICANT CHANGE UP (ref 96–108)
CHLORIDE SERPL-SCNC: 107 MMOL/L — SIGNIFICANT CHANGE UP (ref 96–108)
CK MB CFR SERPL CALC: 2.9 NG/ML — SIGNIFICANT CHANGE UP (ref 0–3.8)
CO2 SERPL-SCNC: 20 MMOL/L — LOW (ref 22–31)
CO2 SERPL-SCNC: 22 MMOL/L — SIGNIFICANT CHANGE UP (ref 22–31)
CREAT SERPL-MCNC: 0.79 MG/DL — SIGNIFICANT CHANGE UP (ref 0.5–1.3)
CREAT SERPL-MCNC: 0.81 MG/DL — SIGNIFICANT CHANGE UP (ref 0.5–1.3)
EOSINOPHIL # BLD AUTO: 0.28 K/UL — SIGNIFICANT CHANGE UP (ref 0–0.5)
EOSINOPHIL NFR BLD AUTO: 3.3 % — SIGNIFICANT CHANGE UP (ref 0–6)
GLUCOSE BLDC GLUCOMTR-MCNC: 168 MG/DL — HIGH (ref 70–99)
GLUCOSE BLDC GLUCOMTR-MCNC: 216 MG/DL — HIGH (ref 70–99)
GLUCOSE BLDC GLUCOMTR-MCNC: 265 MG/DL — HIGH (ref 70–99)
GLUCOSE SERPL-MCNC: 163 MG/DL — HIGH (ref 70–99)
GLUCOSE SERPL-MCNC: 295 MG/DL — HIGH (ref 70–99)
HCT VFR BLD CALC: 31.4 % — LOW (ref 34.5–45)
HCT VFR BLD CALC: 31.9 % — LOW (ref 34.5–45)
HCT VFR BLD CALC: 33.7 % — LOW (ref 34.5–45)
HCV AB S/CO SERPL IA: 0.08 S/CO — SIGNIFICANT CHANGE UP (ref 0–0.99)
HCV AB SERPL-IMP: SIGNIFICANT CHANGE UP
HGB BLD-MCNC: 10.2 G/DL — LOW (ref 11.5–15.5)
HGB BLD-MCNC: 10.3 G/DL — LOW (ref 11.5–15.5)
HGB BLD-MCNC: 10.9 G/DL — LOW (ref 11.5–15.5)
IMM GRANULOCYTES NFR BLD AUTO: 0.5 % — SIGNIFICANT CHANGE UP (ref 0–1.5)
INR BLD: 1.12 RATIO — SIGNIFICANT CHANGE UP (ref 0.88–1.16)
LYMPHOCYTES # BLD AUTO: 3.1 K/UL — SIGNIFICANT CHANGE UP (ref 1–3.3)
LYMPHOCYTES # BLD AUTO: 37 % — SIGNIFICANT CHANGE UP (ref 13–44)
MAGNESIUM SERPL-MCNC: 1.9 MG/DL — SIGNIFICANT CHANGE UP (ref 1.6–2.6)
MAGNESIUM SERPL-MCNC: 2 MG/DL — SIGNIFICANT CHANGE UP (ref 1.6–2.6)
MCHC RBC-ENTMCNC: 25.5 PG — LOW (ref 27–34)
MCHC RBC-ENTMCNC: 25.5 PG — LOW (ref 27–34)
MCHC RBC-ENTMCNC: 25.6 PG — LOW (ref 27–34)
MCHC RBC-ENTMCNC: 32 GM/DL — SIGNIFICANT CHANGE UP (ref 32–36)
MCHC RBC-ENTMCNC: 32.3 GM/DL — SIGNIFICANT CHANGE UP (ref 32–36)
MCHC RBC-ENTMCNC: 32.8 GM/DL — SIGNIFICANT CHANGE UP (ref 32–36)
MCV RBC AUTO: 77.7 FL — LOW (ref 80–100)
MCV RBC AUTO: 79.3 FL — LOW (ref 80–100)
MCV RBC AUTO: 79.8 FL — LOW (ref 80–100)
MONOCYTES # BLD AUTO: 0.58 K/UL — SIGNIFICANT CHANGE UP (ref 0–0.9)
MONOCYTES NFR BLD AUTO: 6.9 % — SIGNIFICANT CHANGE UP (ref 2–14)
NEUTROPHILS # BLD AUTO: 4.34 K/UL — SIGNIFICANT CHANGE UP (ref 1.8–7.4)
NEUTROPHILS NFR BLD AUTO: 51.9 % — SIGNIFICANT CHANGE UP (ref 43–77)
NRBC # BLD: 0 /100 WBCS — SIGNIFICANT CHANGE UP (ref 0–0)
PHOSPHATE SERPL-MCNC: 3.4 MG/DL — SIGNIFICANT CHANGE UP (ref 2.5–4.5)
PHOSPHATE SERPL-MCNC: 3.9 MG/DL — SIGNIFICANT CHANGE UP (ref 2.5–4.5)
PLATELET # BLD AUTO: 398 K/UL — SIGNIFICANT CHANGE UP (ref 150–400)
PLATELET # BLD AUTO: 405 K/UL — HIGH (ref 150–400)
PLATELET # BLD AUTO: 409 K/UL — HIGH (ref 150–400)
POTASSIUM SERPL-MCNC: 3.4 MMOL/L — LOW (ref 3.5–5.3)
POTASSIUM SERPL-MCNC: 3.8 MMOL/L — SIGNIFICANT CHANGE UP (ref 3.5–5.3)
POTASSIUM SERPL-SCNC: 3.4 MMOL/L — LOW (ref 3.5–5.3)
POTASSIUM SERPL-SCNC: 3.8 MMOL/L — SIGNIFICANT CHANGE UP (ref 3.5–5.3)
PROTHROM AB SERPL-ACNC: 12.9 SEC — SIGNIFICANT CHANGE UP (ref 10–12.9)
RBC # BLD: 4 M/UL — SIGNIFICANT CHANGE UP (ref 3.8–5.2)
RBC # BLD: 4.04 M/UL — SIGNIFICANT CHANGE UP (ref 3.8–5.2)
RBC # BLD: 4.25 M/UL — SIGNIFICANT CHANGE UP (ref 3.8–5.2)
RBC # FLD: 13.8 % — SIGNIFICANT CHANGE UP (ref 10.3–14.5)
RBC # FLD: 13.9 % — SIGNIFICANT CHANGE UP (ref 10.3–14.5)
RBC # FLD: 14 % — SIGNIFICANT CHANGE UP (ref 10.3–14.5)
SODIUM SERPL-SCNC: 140 MMOL/L — SIGNIFICANT CHANGE UP (ref 135–145)
SODIUM SERPL-SCNC: 144 MMOL/L — SIGNIFICANT CHANGE UP (ref 135–145)
TROPONIN T, HIGH SENSITIVITY RESULT: 1058 NG/L — HIGH (ref 0–51)
TROPONIN T, HIGH SENSITIVITY RESULT: 1218 NG/L — HIGH (ref 0–51)
WBC # BLD: 8.37 K/UL — SIGNIFICANT CHANGE UP (ref 3.8–10.5)
WBC # BLD: 8.58 K/UL — SIGNIFICANT CHANGE UP (ref 3.8–10.5)
WBC # BLD: 9.6 K/UL — SIGNIFICANT CHANGE UP (ref 3.8–10.5)
WBC # FLD AUTO: 8.37 K/UL — SIGNIFICANT CHANGE UP (ref 3.8–10.5)
WBC # FLD AUTO: 8.58 K/UL — SIGNIFICANT CHANGE UP (ref 3.8–10.5)
WBC # FLD AUTO: 9.6 K/UL — SIGNIFICANT CHANGE UP (ref 3.8–10.5)

## 2019-10-21 PROCEDURE — 93010 ELECTROCARDIOGRAM REPORT: CPT | Mod: 77

## 2019-10-21 PROCEDURE — 93306 TTE W/DOPPLER COMPLETE: CPT | Mod: 26

## 2019-10-21 PROCEDURE — 99233 SBSQ HOSP IP/OBS HIGH 50: CPT | Mod: GC

## 2019-10-21 PROCEDURE — 93010 ELECTROCARDIOGRAM REPORT: CPT

## 2019-10-21 RX ORDER — HEPARIN SODIUM 5000 [USP'U]/ML
950 INJECTION INTRAVENOUS; SUBCUTANEOUS
Qty: 25000 | Refills: 0 | Status: DISCONTINUED | OUTPATIENT
Start: 2019-10-21 | End: 2019-10-22

## 2019-10-21 RX ORDER — FUROSEMIDE 40 MG
40 TABLET ORAL ONCE
Refills: 0 | Status: DISCONTINUED | OUTPATIENT
Start: 2019-10-21 | End: 2019-10-21

## 2019-10-21 RX ORDER — FUROSEMIDE 40 MG
40 TABLET ORAL ONCE
Refills: 0 | Status: COMPLETED | OUTPATIENT
Start: 2019-10-21 | End: 2019-10-21

## 2019-10-21 RX ORDER — FUROSEMIDE 40 MG
40 TABLET ORAL ONCE
Refills: 0 | Status: COMPLETED | OUTPATIENT
Start: 2019-10-22 | End: 2019-10-22

## 2019-10-21 RX ORDER — POTASSIUM CHLORIDE 20 MEQ
10 PACKET (EA) ORAL
Refills: 0 | Status: COMPLETED | OUTPATIENT
Start: 2019-10-21 | End: 2019-10-21

## 2019-10-21 RX ORDER — HEPARIN SODIUM 5000 [USP'U]/ML
3500 INJECTION INTRAVENOUS; SUBCUTANEOUS EVERY 6 HOURS
Refills: 0 | Status: DISCONTINUED | OUTPATIENT
Start: 2019-10-21 | End: 2019-10-21

## 2019-10-21 RX ORDER — HEPARIN SODIUM 5000 [USP'U]/ML
3500 INJECTION INTRAVENOUS; SUBCUTANEOUS EVERY 6 HOURS
Refills: 0 | Status: DISCONTINUED | OUTPATIENT
Start: 2019-10-21 | End: 2019-10-22

## 2019-10-21 RX ORDER — MAGNESIUM OXIDE 400 MG ORAL TABLET 241.3 MG
400 TABLET ORAL
Refills: 0 | Status: COMPLETED | OUTPATIENT
Start: 2019-10-21 | End: 2019-10-22

## 2019-10-21 RX ORDER — POTASSIUM CHLORIDE 20 MEQ
40 PACKET (EA) ORAL ONCE
Refills: 0 | Status: COMPLETED | OUTPATIENT
Start: 2019-10-21 | End: 2019-10-21

## 2019-10-21 RX ORDER — HEPARIN SODIUM 5000 [USP'U]/ML
950 INJECTION INTRAVENOUS; SUBCUTANEOUS
Qty: 25000 | Refills: 0 | Status: DISCONTINUED | OUTPATIENT
Start: 2019-10-21 | End: 2019-10-21

## 2019-10-21 RX ADMIN — Medication 100 MILLIEQUIVALENT(S): at 12:10

## 2019-10-21 RX ADMIN — Medication 100 MILLIEQUIVALENT(S): at 09:26

## 2019-10-21 RX ADMIN — Medication 1: at 08:37

## 2019-10-21 RX ADMIN — TICAGRELOR 90 MILLIGRAM(S): 90 TABLET ORAL at 19:28

## 2019-10-21 RX ADMIN — TICAGRELOR 90 MILLIGRAM(S): 90 TABLET ORAL at 06:07

## 2019-10-21 RX ADMIN — Medication 40 MILLIEQUIVALENT(S): at 09:22

## 2019-10-21 RX ADMIN — LISINOPRIL 10 MILLIGRAM(S): 2.5 TABLET ORAL at 06:07

## 2019-10-21 RX ADMIN — Medication 40 MILLIGRAM(S): at 16:18

## 2019-10-21 RX ADMIN — ATORVASTATIN CALCIUM 80 MILLIGRAM(S): 80 TABLET, FILM COATED ORAL at 21:11

## 2019-10-21 RX ADMIN — Medication 2: at 12:45

## 2019-10-21 RX ADMIN — HEPARIN SODIUM 950 UNIT(S)/HR: 5000 INJECTION INTRAVENOUS; SUBCUTANEOUS at 00:07

## 2019-10-21 RX ADMIN — Medication 40 MILLIEQUIVALENT(S): at 23:16

## 2019-10-21 RX ADMIN — HEPARIN SODIUM 950 UNIT(S)/HR: 5000 INJECTION INTRAVENOUS; SUBCUTANEOUS at 19:37

## 2019-10-21 RX ADMIN — Medication 81 MILLIGRAM(S): at 08:35

## 2019-10-21 RX ADMIN — Medication 1: at 21:16

## 2019-10-21 RX ADMIN — Medication 100 MILLIEQUIVALENT(S): at 13:49

## 2019-10-21 RX ADMIN — HEPARIN SODIUM 950 UNIT(S)/HR: 5000 INJECTION INTRAVENOUS; SUBCUTANEOUS at 08:41

## 2019-10-21 NOTE — PROGRESS NOTE ADULT - SUBJECTIVE AND OBJECTIVE BOX
***************************************************************  Nakul Wallace, PGY1  Internal Medicine   pager: [phone]  ***************************************************************    AICHA BILLY  63y  MRN: [id number]    Patient is a 63y old  Female who presents with a chief complaint of NSTEMI (20 Oct 2019 08:23)      Subjective: no events ON. Denies fever, CP, SOB, abn pain, N/V, dysuria. Tolerating diet.      MEDICATIONS  (STANDING):  aspirin enteric coated 81 milliGRAM(s) Oral daily  atorvastatin 80 milliGRAM(s) Oral at bedtime  dextrose 5%. 1000 milliLiter(s) (50 mL/Hr) IV Continuous <Continuous>  dextrose 50% Injectable 12.5 Gram(s) IV Push once  dextrose 50% Injectable 25 Gram(s) IV Push once  dextrose 50% Injectable 25 Gram(s) IV Push once  heparin  Infusion. 950 Unit(s)/Hr (9.5 mL/Hr) IV Continuous <Continuous>  influenza   Vaccine 0.5 milliLiter(s) IntraMuscular once  insulin lispro (HumaLOG) corrective regimen sliding scale   SubCutaneous at bedtime  insulin lispro (HumaLOG) corrective regimen sliding scale   SubCutaneous three times a day before meals  lisinopril 10 milliGRAM(s) Oral daily  potassium chloride  10 mEq/100 mL IVPB 10 milliEquivalent(s) IV Intermittent every 1 hour  ticagrelor 90 milliGRAM(s) Oral every 12 hours    MEDICATIONS  (PRN):  dextrose 40% Gel 15 Gram(s) Oral once PRN Blood Glucose LESS THAN 70 milliGRAM(s)/deciliter  glucagon  Injectable 1 milliGRAM(s) IntraMuscular once PRN Glucose LESS THAN 70 milligrams/deciliter  heparin  Injectable 3500 Unit(s) IV Push every 6 hours PRN For aPTT less than 40      Objective:    Vitals: Vital Signs Last 24 Hrs  T(C): 36.6 (10-21-19 @ 12:36), Max: 36.9 (10-20-19 @ 16:06)  T(F): 97.9 (10-21-19 @ 12:36), Max: 98.5 (10-20-19 @ 16:06)  HR: 106 (10-21-19 @ 12:36) (68 - 106)  BP: 142/90 (10-21-19 @ 12:36) (109/69 - 157/97)  BP(mean): --  RR: 18 (10-21-19 @ 12:36) (18 - 20)  SpO2: 99% (10-21-19 @ 12:36) (93% - 99%)            I&O's Summary    20 Oct 2019 07:01  -  21 Oct 2019 07:00  --------------------------------------------------------  IN: 594 mL / OUT: 300 mL / NET: 294 mL    21 Oct 2019 07:01  -  21 Oct 2019 13:47  --------------------------------------------------------  IN: 580 mL / OUT: 0 mL / NET: 580 mL        PHYSICAL EXAM:  GENERAL: NAD  HEAD:  Atraumatic, Normocephalic  EYES: EOMI, conjunctiva and sclera clear  CHEST/LUNG: Clear to percussion bilaterally; No rales, rhonchi, wheezing, or rubs  HEART: Regular rate and rhythm; No murmurs, rubs, or gallops  ABDOMEN: Soft, Nontender, Nondistended;   SKIN: No rashes or lesions  NERVOUS SYSTEM:  Alert & Oriented X3, no focal deficit    LABS:  10-21    144  |  107  |  20  ----------------------------<  163<H>  3.4<L>   |  22  |  0.81  10-20    142  |  106  |  12  ----------------------------<  164<H>  4.0   |  20<L>  |  0.68  10-20    139  |  102  |  13  ----------------------------<  208<H>  5.6<H>   |  21<L>  |  0.60    Ca    8.1<L>      21 Oct 2019 06:30  Ca    8.4      20 Oct 2019 06:18  Ca    8.9      20 Oct 2019 01:50  Phos  3.9     10-21  Mg     2.0     10-21    TPro  7.7  /  Alb  3.5  /  TBili  0.4  /  DBili  x   /  AST  46<H>  /  ALT  22  /  AlkPhos  85  10-20      PT/INR - ( 21 Oct 2019 06:30 )   PT: 12.9 sec;   INR: 1.12 ratio         PTT - ( 21 Oct 2019 06:30 )  PTT:64.9 sec                                        10.3   8.37  )-----------( 398      ( 21 Oct 2019 06:30 )             31.4                         11.3   9.29  )-----------( 403      ( 20 Oct 2019 10:49 )             34.4                         10.5   9.68  )-----------( 389      ( 20 Oct 2019 06:18 )             32.6     CAPILLARY BLOOD GLUCOSE      POCT Blood Glucose.: 216 mg/dL (21 Oct 2019 12:42)  POCT Blood Glucose.: 168 mg/dL (21 Oct 2019 08:36)  POCT Blood Glucose.: 219 mg/dL (20 Oct 2019 22:01)  POCT Blood Glucose.: 90 mg/dL (20 Oct 2019 18:47)  POCT Blood Glucose.: 216 mg/dL (20 Oct 2019 13:58)      RADIOLOGY & ADDITIONAL TESTS:    Imaging Personally Reviewed:  [x ] YES  [ ] NO    Consultants involved in case:   Consultant(s) Notes Reviewed:  [ x] YES  [ ] NO:   Care Discussed with Consultants/Other Providers [x ] YES  [ ] NO

## 2019-10-21 NOTE — CHART NOTE - NSCHARTNOTEFT_GEN_A_CORE
Pt brought to cath lab holding area noted to be dyspneic, abdominal breathing lungs with rales  O2 sat 98% on nasal cannula   Sinus Tach   /85  Evaluated by Interventional attending  Dr. Dukes  Lasix stat dose given - voided 400 cc  urine   Cath postponed for today  Will obtain CCU #2 bed for closer monitoring overnight

## 2019-10-21 NOTE — CHART NOTE - NSCHARTNOTEFT_GEN_A_CORE
CCU2 accept note     PATIENT: Simran Wise     CHIEF COMPLAINT:  Patient is a 63y old  Female who presents with a chief complaint of NSTEMI (21 Oct 2019 13:47)    HPI / INTERVAL HISTORY:        OBJECTIVE:  ICU Vital Signs Last 24 Hrs  T(C): 36.9 (21 Oct 2019 19:00), Max: 36.9 (21 Oct 2019 19:00)  T(F): 98.5 (21 Oct 2019 19:00), Max: 98.5 (21 Oct 2019 19:00)  HR: 100 (21 Oct 2019 19:00) (68 - 109)  BP: 140/84 (21 Oct 2019 19:00) (123/81 - 147/86)  BP(mean): 107 (21 Oct 2019 19:00) (107 - 111)  RR: 27 (21 Oct 2019 19:00) (18 - 29)  SpO2: 98% (21 Oct 2019 19:00) (97% - 100%)      POCT Blood Glucose.: 216 mg/dL (21 Oct 2019 12:42)  POCT Blood Glucose.: 168 mg/dL (21 Oct 2019 08:36)  POCT Blood Glucose.: 219 mg/dL (20 Oct 2019 22:01)    CAPILLARY BLOOD GLUCOSE      POCT Blood Glucose.: 216 mg/dL (21 Oct 2019 12:42)    I&O's Summary    20 Oct 2019 07:  -  21 Oct 2019 07:00  --------------------------------------------------------  IN: 594 mL / OUT: 300 mL / NET: 294 mL    21 Oct 2019 07:01  -  21 Oct 2019 19:22  --------------------------------------------------------  IN: 856 mL / OUT: 0 mL / NET: 856 mL      Daily Height in cm: 162.56 (20 Oct 2019 20:00)    Daily Weight in k.7 (21 Oct 2019 03:50)    PHYSICAL EXAM:  Gen:   HEENT: NC/AT; PERRL, anicteric sclera  Neck: supple, no JVD  Resp: clear to ausculation B/L; no wheezes, rales or rhonchi  Cardiovasc: S1S2 normal; RRR; no murmurs, rubs or gallops  GI: soft, nondistended, nontender; +BS  Extr: warm, well-perfused, PT/DP pulses 2+ B/L; no LE edema  Skin: normal color and turgor  Neuro:       LABS:                          10.2   8.58  )-----------( 409      ( 21 Oct 2019 15:01 )             31.9     10-21    144  |  107  |  20  ----------------------------<  163<H>  3.4<L>   |  22  |  0.81    Ca    8.1<L>      21 Oct 2019 06:30  Phos  3.9     10-21  Mg     2.0     10-21    TPro  7.7  /  Alb  3.5  /  TBili  0.4  /  DBili  x   /  AST  46<H>  /  ALT  22  /  AlkPhos  85  10-20    LIVER FUNCTIONS - ( 20 Oct 2019 01:50 )  Alb: 3.5 g/dL / Pro: 7.7 g/dL / ALK PHOS: 85 U/L / ALT: 22 U/L / AST: 46 U/L / GGT: x           PT/INR - ( 21 Oct 2019 06:30 )   PT: 12.9 sec;   INR: 1.12 ratio         PTT - ( 21 Oct 2019 15:01 )  PTT:66.0 sec    CARDIAC MARKERS ( 20 Oct 2019 06:18 )  x     / x     / 176 U/L / x     / 13.1 ng/mL            RADIOLOGY & ADDITIONAL TESTS:       MEDICATIONS:  aspirin enteric coated 81 milliGRAM(s) Oral daily  atorvastatin 80 milliGRAM(s) Oral at bedtime  dextrose 40% Gel 15 Gram(s) Oral once PRN  dextrose 5%. 1000 milliLiter(s) IV Continuous <Continuous>  dextrose 50% Injectable 12.5 Gram(s) IV Push once  dextrose 50% Injectable 25 Gram(s) IV Push once  dextrose 50% Injectable 25 Gram(s) IV Push once  glucagon  Injectable 1 milliGRAM(s) IntraMuscular once PRN  heparin  Infusion. 950 Unit(s)/Hr IV Continuous <Continuous>  heparin  Injectable 3500 Unit(s) IV Push every 6 hours PRN  influenza   Vaccine 0.5 milliLiter(s) IntraMuscular once  insulin lispro (HumaLOG) corrective regimen sliding scale   SubCutaneous at bedtime  insulin lispro (HumaLOG) corrective regimen sliding scale   SubCutaneous three times a day before meals  lisinopril 10 milliGRAM(s) Oral daily  ticagrelor 90 milliGRAM(s) Oral every 12 hours      ALLERGIES:  No Known Allergies CCU2 accept note     PATIENT: Simran Wise     CHIEF COMPLAINT:  Patient is a 63y old  Female who presents with a chief complaint of NSTEMI (21 Oct 2019 13:47)    HPI / INTERVAL HISTORY:    Pt is a 62 yo female with DM, HTN, and CVA (requiring cane to ambulate, but no focal deficits)who presented to [facility] with a several hour history of shortness of breath and abdominal pain. Patient states these symptoms started around 4 pmyesterday to this. Prior to this day, patient denies having any similar symptoms prior to yesterday. She had an EKG at [facility] that showed ALAN in lead III and ST depressions in V2-V5. The troponing at the OSH was 1.15, nd propBNP of 4000. She was given lasix, ASA/Brillinta loaded, and started on heparin drip. She was transferred to [facility] for HLOC given concern for NSTEMI.    Hospital course: Patient continued on ASA, Brillinta and heparin drip for NSTEMI. Patient was brought to the cath lab holding area and was noted to be dsypnec, abdominal breathing with rales, sat 98% on NC. Evaluated by Dr. Dukes, with postponement of cath. Given lasix 40mg x 1, w/ 400cc void. Transferred to CCU2 for close monitoring.      OBJECTIVE:  ICU Vital Signs Last 24 Hrs  T(C): 36.9 (21 Oct 2019 19:00), Max: 36.9 (21 Oct 2019 19:00)  T(F): 98.5 (21 Oct 2019 19:00), Max: 98.5 (21 Oct 2019 19:00)  HR: 100 (21 Oct 2019 19:00) (68 - 109)  BP: 140/84 (21 Oct 2019 19:00) (123/81 - 147/86)  BP(mean): 107 (21 Oct 2019 19:00) (107 - 111)  RR: 27 (21 Oct 2019 19:00) (18 - 29)  SpO2: 98% (21 Oct 2019 19:00) (97% - 100%)      POCT Blood Glucose.: 216 mg/dL (21 Oct 2019 12:42)  POCT Blood Glucose.: 168 mg/dL (21 Oct 2019 08:36)  POCT Blood Glucose.: 219 mg/dL (20 Oct 2019 22:01)    CAPILLARY BLOOD GLUCOSE  POCT Blood Glucose.: 216 mg/dL (21 Oct 2019 12:42)    I&O's Summary    20 Oct 2019 07:  -  21 Oct 2019 07:00  --------------------------------------------------------  IN: 594 mL / OUT: 300 mL / NET: 294 mL    21 Oct 2019 07:01  -  21 Oct 2019 19:22  --------------------------------------------------------  IN: 856 mL / OUT: 0 mL / NET: 856 mL      Daily Height in cm: 162.56 (20 Oct 2019 20:00)    Daily Weight in k.7 (21 Oct 2019 03:50)    PHYSICAL EXAM:  Gen:   HEENT: NC/AT; PERRL, anicteric sclera  Neck: supple, no JVD  Resp: clear to ausculation B/L; no wheezes, rales or rhonchi  Cardiovasc: S1S2 normal; RRR; no murmurs, rubs or gallops  GI: soft, nondistended, nontender; +BS  Extr: warm, well-perfused, PT/DP pulses 2+ B/L; no LE edema  Skin: normal color and turgor  Neuro:       LABS:                          10.2   8.58  )-----------( 409      ( 21 Oct 2019 15:01 )             31.9     10-21    144  |  107  |  20  ----------------------------<  163<H>  3.4<L>   |  22  |  0.81    Ca    8.1<L>      21 Oct 2019 06:30  Phos  3.9     10-21  Mg     2.0     10-21    TPro  7.7  /  Alb  3.5  /  TBili  0.4  /  DBili  x   /  AST  46<H>  /  ALT  22  /  AlkPhos  85  10-20    LIVER FUNCTIONS - ( 20 Oct 2019 01:50 )  Alb: 3.5 g/dL / Pro: 7.7 g/dL / ALK PHOS: 85 U/L / ALT: 22 U/L / AST: 46 U/L / GGT: x           PT/INR - ( 21 Oct 2019 06:30 )   PT: 12.9 sec;   INR: 1.12 ratio         PTT - ( 21 Oct 2019 15:01 )  PTT:66.0 sec    CARDIAC MARKERS ( 20 Oct 2019 06:18 )  x     / x     / 176 U/L / x     / 13.1 ng/mL        RADIOLOGY & ADDITIONAL TESTS:     MEDICATIONS:  aspirin enteric coated 81 milliGRAM(s) Oral daily  atorvastatin 80 milliGRAM(s) Oral at bedtime  dextrose 40% Gel 15 Gram(s) Oral once PRN  dextrose 5%. 1000 milliLiter(s) IV Continuous <Continuous>  dextrose 50% Injectable 12.5 Gram(s) IV Push once  dextrose 50% Injectable 25 Gram(s) IV Push once  dextrose 50% Injectable 25 Gram(s) IV Push once  glucagon  Injectable 1 milliGRAM(s) IntraMuscular once PRN  heparin  Infusion. 950 Unit(s)/Hr IV Continuous <Continuous>  heparin  Injectable 3500 Unit(s) IV Push every 6 hours PRN  influenza   Vaccine 0.5 milliLiter(s) IntraMuscular once  insulin lispro (HumaLOG) corrective regimen sliding scale   SubCutaneous at bedtime  insulin lispro (HumaLOG) corrective regimen sliding scale   SubCutaneous three times a day before meals  lisinopril 10 milliGRAM(s) Oral daily  ticagrelor 90 milliGRAM(s) Oral every 12 hours      ALLERGIES:  No Known Allergies    ASSESSMENT AND PLAN:  #Neuro    #Resp    #CV  - On ASA 81mg daily, Brillinta 90mg BID, and heparin drip   - Atorvastatin 80mg   - On lisinopril 10mg daily     #GI    #ID    #Renal    #Heme    #Endo  - Consistent carb and SSI     #DVT PPx CCU2 accept note     PATIENT: Simran Wise     CHIEF COMPLAINT:  Patient is a 63y old  Female who presents with a chief complaint of NSTEMI (21 Oct 2019 13:47)    HPI / INTERVAL HISTORY:    Pt is a 64 yo female with DM, HTN, and CVA (requiring cane to ambulate, but no focal deficits)who presented to [facility] with a several hour history of shortness of breath and abdominal pain. Patient states these symptoms started around 4 pmyesterday to this. Prior to this day, patient denies having any similar symptoms prior to yesterday. She had an EKG at [facility] that showed ALAN in lead III and ST depressions in V2-V5. The troponing at the OSH was 1.15, nd propBNP of 4000. She was given lasix, ASA/Brillinta loaded, and started on heparin drip. She was transferred to [facility] for HLOC given concern for NSTEMI.    Hospital course: Patient continued on ASA, Brillinta and heparin drip for NSTEMI. Patient was brought to the cath lab holding area and was noted to be dsypneic, abdominal breathing with rales, sat 98% on NC. Evaluated by Dr. Dukes, with postponement of cath. Given lasix 40mg x 1, w/ 400cc void. Transferred to CCU2 for close monitoring.      OBJECTIVE:  ICU Vital Signs Last 24 Hrs  T(C): 36.9 (21 Oct 2019 19:00), Max: 36.9 (21 Oct 2019 19:00)  T(F): 98.5 (21 Oct 2019 19:00), Max: 98.5 (21 Oct 2019 19:00)  HR: 100 (21 Oct 2019 19:00) (68 - 109)  BP: 140/84 (21 Oct 2019 19:00) (123/81 - 147/86)  BP(mean): 107 (21 Oct 2019 19:00) (107 - 111)  RR: 27 (21 Oct 2019 19:00) (18 - 29)  SpO2: 98% (21 Oct 2019 19:00) (97% - 100%)      POCT Blood Glucose.: 216 mg/dL (21 Oct 2019 12:42)  POCT Blood Glucose.: 168 mg/dL (21 Oct 2019 08:36)  POCT Blood Glucose.: 219 mg/dL (20 Oct 2019 22:01)    CAPILLARY BLOOD GLUCOSE  POCT Blood Glucose.: 216 mg/dL (21 Oct 2019 12:42)    I&O's Summary    20 Oct 2019 07:  -  21 Oct 2019 07:00  --------------------------------------------------------  IN: 594 mL / OUT: 300 mL / NET: 294 mL    21 Oct 2019 07:01  -  21 Oct 2019 19:22  --------------------------------------------------------  IN: 856 mL / OUT: 0 mL / NET: 856 mL      Daily Height in cm: 162.56 (20 Oct 2019 20:00)    Daily Weight in k.7 (21 Oct 2019 03:50)    PHYSICAL EXAM:  Gen:   HEENT: NC/AT; PERRL, anicteric sclera  Neck: supple, no JVD  Resp: clear to ausculation B/L; no wheezes, rales or rhonchi  Cardiovasc: S1S2 normal; RRR; no murmurs, rubs or gallops  GI: soft, nondistended, nontender; +BS  Extr: warm, well-perfused, PT/DP pulses 2+ B/L; no LE edema  Skin: normal color and turgor  Neuro:       LABS:                          10.2   8.58  )-----------( 409      ( 21 Oct 2019 15:01 )             31.9     10-21    144  |  107  |  20  ----------------------------<  163<H>  3.4<L>   |  22  |  0.81    Ca    8.1<L>      21 Oct 2019 06:30  Phos  3.9     10-21  Mg     2.0     10-21    TPro  7.7  /  Alb  3.5  /  TBili  0.4  /  DBili  x   /  AST  46<H>  /  ALT  22  /  AlkPhos  85  10-20    LIVER FUNCTIONS - ( 20 Oct 2019 01:50 )  Alb: 3.5 g/dL / Pro: 7.7 g/dL / ALK PHOS: 85 U/L / ALT: 22 U/L / AST: 46 U/L / GGT: x           PT/INR - ( 21 Oct 2019 06:30 )   PT: 12.9 sec;   INR: 1.12 ratio         PTT - ( 21 Oct 2019 15:01 )  PTT:66.0 sec    CARDIAC MARKERS ( 20 Oct 2019 06:18 )  x     / x     / 176 U/L / x     / 13.1 ng/mL        RADIOLOGY & ADDITIONAL TESTS:     MEDICATIONS:  aspirin enteric coated 81 milliGRAM(s) Oral daily  atorvastatin 80 milliGRAM(s) Oral at bedtime  dextrose 40% Gel 15 Gram(s) Oral once PRN  dextrose 5%. 1000 milliLiter(s) IV Continuous <Continuous>  dextrose 50% Injectable 12.5 Gram(s) IV Push once  dextrose 50% Injectable 25 Gram(s) IV Push once  dextrose 50% Injectable 25 Gram(s) IV Push once  glucagon  Injectable 1 milliGRAM(s) IntraMuscular once PRN  heparin  Infusion. 950 Unit(s)/Hr IV Continuous <Continuous>  heparin  Injectable 3500 Unit(s) IV Push every 6 hours PRN  influenza   Vaccine 0.5 milliLiter(s) IntraMuscular once  insulin lispro (HumaLOG) corrective regimen sliding scale   SubCutaneous at bedtime  insulin lispro (HumaLOG) corrective regimen sliding scale   SubCutaneous three times a day before meals  lisinopril 10 milliGRAM(s) Oral daily  ticagrelor 90 milliGRAM(s) Oral every 12 hours      ALLERGIES:  No Known Allergies    ASSESSMENT AND PLAN:  #Neuro  - Hx of CVA (2019) w/ deficits.     #Resp    #CV  - NSTEMI -  On ASA 81mg daily, Brillinta 90mg BID, and heparin drip, Atorvastatin 80mg   - On lisinopril 10mg daily     #GI    #ID    #Renal    #Heme  - Hgb downtrending from 11 - 10.     #Endo  - A1c of 7.5  - Consistent carb and SSI     #DVT PPx  - on heparin drip     Dispo- pending cath and PT recs. CCU2 accept note     PATIENT: Simran Wise     CHIEF COMPLAINT:  Patient is a 63y old  Female who presents with a chief complaint of NSTEMI (21 Oct 2019 13:47)    HPI / INTERVAL HISTORY:    Pt is a 64 yo female with DM, HTN, and CVA (requiring cane to ambulate, but no focal deficits)who presented to [facility] with a several hour history of shortness of breath and abdominal pain. Patient states these symptoms started around 4 pmyesterday to this. Prior to this day, patient denies having any similar symptoms prior to yesterday. She had an EKG at [facility] that showed ALAN in lead III and ST depressions in V2-V5. The troponing at the OSH was 1.15, nd propBNP of 4000. She was given lasix, ASA/Brillinta loaded, and started on heparin drip. She was transferred to [facility] for HLOC given concern for NSTEMI.    Hospital course: Patient continued on ASA, Brillinta and heparin drip for NSTEMI. Patient was brought to the cath lab holding area and was noted to be dsypneic, abdominal breathing with rales, sat 98% on NC. Evaluated by Dr. Dukes, with postponement of cath. Given lasix 40mg x 1, w/ 400cc void. Transferred to CCU2 for close monitoring.      ALLERGIES:  No Known Allergies    MEDICATIONS:  aspirin enteric coated 81 milliGRAM(s) Oral daily  atorvastatin 80 milliGRAM(s) Oral at bedtime  dextrose 40% Gel 15 Gram(s) Oral once PRN  dextrose 5%. 1000 milliLiter(s) IV Continuous <Continuous>  dextrose 50% Injectable 12.5 Gram(s) IV Push once  dextrose 50% Injectable 25 Gram(s) IV Push once  dextrose 50% Injectable 25 Gram(s) IV Push once  glucagon  Injectable 1 milliGRAM(s) IntraMuscular once PRN  heparin  Infusion. 950 Unit(s)/Hr IV Continuous <Continuous>  heparin  Injectable 3500 Unit(s) IV Push every 6 hours PRN  influenza   Vaccine 0.5 milliLiter(s) IntraMuscular once  insulin lispro (HumaLOG) corrective regimen sliding scale   SubCutaneous at bedtime  insulin lispro (HumaLOG) corrective regimen sliding scale   SubCutaneous three times a day before meals  lisinopril 10 milliGRAM(s) Oral daily  ticagrelor 90 milliGRAM(s) Oral every 12 hours    OBJECTIVE:  ICU Vital Signs Last 24 Hrs  T(C): 36.9 (21 Oct 2019 19:00), Max: 36.9 (21 Oct 2019 19:00)  T(F): 98.5 (21 Oct 2019 19:00), Max: 98.5 (21 Oct 2019 19:00)  HR: 100 (21 Oct 2019 19:00) (68 - 109)  BP: 140/84 (21 Oct 2019 19:00) (123/81 - 147/86)  BP(mean): 107 (21 Oct 2019 19:00) (107 - 111)  RR: 27 (21 Oct 2019 19:00) (18 - 29)  SpO2: 98% (21 Oct 2019 19:00) (97% - 100%)      POCT Blood Glucose.: 216 mg/dL (21 Oct 2019 12:42)  POCT Blood Glucose.: 168 mg/dL (21 Oct 2019 08:36)  POCT Blood Glucose.: 219 mg/dL (20 Oct 2019 22:01)    CAPILLARY BLOOD GLUCOSE  POCT Blood Glucose.: 216 mg/dL (21 Oct 2019 12:42)    I&O's Summary    20 Oct 2019 07:01  -  21 Oct 2019 07:00  --------------------------------------------------------  IN: 594 mL / OUT: 300 mL / NET: 294 mL    21 Oct 2019 07:01  -  21 Oct 2019 19:22  --------------------------------------------------------  IN: 856 mL / OUT: 0 mL / NET: 856 mL      Daily Height in cm: 162.56 (20 Oct 2019 20:00)    Daily Weight in k.7 (21 Oct 2019 03:50)    PHYSICAL EXAM:  Gen:   HEENT: NC/AT; PERRL, anicteric sclera  Neck: supple, no JVD  Resp: clear to ausculation B/L; no wheezes, rales or rhonchi  Cardiovasc: S1S2 normal; RRR; no murmurs, rubs or gallops  GI: soft, nondistended, nontender; +BS  Extr: warm, well-perfused, PT/DP pulses 2+ B/L; no LE edema  Skin: normal color and turgor  Neuro:       LABS:                          10.2   8.58  )-----------( 409      ( 21 Oct 2019 15:01 )             31.9     10-21    144  |  107  |  20  ----------------------------<  163<H>  3.4<L>   |  22  |  0.81    Ca    8.1<L>      21 Oct 2019 06:30  Phos  3.9     10-  Mg     2.0     10-    TPro  7.7  /  Alb  3.5  /  TBili  0.4  /  DBili  x   /  AST  46<H>  /  ALT  22  /  AlkPhos  85  10-20    LIVER FUNCTIONS - ( 20 Oct 2019 01:50 )  Alb: 3.5 g/dL / Pro: 7.7 g/dL / ALK PHOS: 85 U/L / ALT: 22 U/L / AST: 46 U/L / GGT: x           PT/INR - ( 21 Oct 2019 06:30 )   PT: 12.9 sec;   INR: 1.12 ratio    PTT - ( 21 Oct 2019 15:01 )  PTT:66.0 sec    CARDIAC MARKERS ( 20 Oct 2019 06:18 )  x     / x     / 176 U/L / x     / 13.1 ng/mL    RADIOLOGY & ADDITIONAL TESTS:     < from: TTE with Doppler (w/Cont) (10..19 @ 10:52) >    Fractional short: 33 %  EF (Modified Flores Rule): 32 %Doppler Peak Velocity  (m/sec): AoV=1.1  ------------------------------------------------------------------------  Observations:  Mitral Valve: Mitral annular calcification and calcified  mitral leaflets with normal diastolic opening. Moderate  mitral regurgitation.  Aortic Valve/Aorta: Calcified trileaflet aortic valve with  decreased opening. Peak transaortic valve gradient equals 5  mm Hg, mean transaortic valve gradient equals 3 mm Hg,  estimated aortic valve area equals 1.9 sqcm (by continuity  equation), aortic valve velocity time integral equals 21  cm, consistent with mild aortic stenosis. No aortic valve  regurgitation seen. Peak left ventricular outflow tract  gradient equals 1 mm Hg, LVOT velocity time integral equals  11 cm.  Aortic Root: 2.8 cm.  Left Atrium: Normal left atrium.  LA volume index = 29  cc/m2.  Left Ventricle: Severe segmental left ventricular systolic  dysfunction. The mid anterior septum, the mid inferolateral  wall, the basal  inferolateral wall, the apical anterior  wall, the apical lateral wall, the basal inferior wall, the  basal anterolateral wall, the mid inferior wall, the mid  anterolateral wall, and the mid inferoseptum are  hypokinetic. The apical inferior wall, the apical septum,  and the apical cap are akinetic.  Normal left ventricular  internal dimensions and wall thicknesses. Mild diastolic  dysfunction (Stage I).  Right Heart: Normal right atrium. Normal right ventricular  size and function. Normal tricuspid valve. Mild tricuspid  regurgitation. Pulmonic valve not well visualized, probably  normal.  Pericardium/Pleura: Normal pericardium with no pericardial  effusion.  Bilateral pleural effusions.  ------------------------------------------------------------------------  Conclusions:  1. Mitral annular calcification and calcified mitral  leaflets with normal diastolic opening. Moderate mitral  regurgitation.  2. Calcified trileaflet aortic valve with decreased  opening. Peak transaortic valve gradient equals 5 mm Hg,  mean transaortic valve gradient equals 3 mm Hg, estimated  aortic valve area equals 1.9 sqcm (by continuity equation),  aortic valve velocity time integral equals 21 cm,  consistent with mild aortic stenosis. No aortic valve  regurgitation seen.  3. Severe segmental left ventricular systolic dysfunction.  The mid anterior septum, the mid inferolateral wall, the  basal  inferolateral wall, the apical anterior wall, the  apical lateral wall, the basal inferior wall, the basal  anterolateral wall, the mid inferior wall, the mid  anterolateral wall, and the mid inferoseptum are  hypokinetic. The apical inferior wall, the apical septum,  and the apical cap are akinetic.  4. Mild diastolic dysfunction (Stage I).  5. Normal right ventricular size and function.  6. Normal pericardium with no pericardial effusion.  7. Bilateralpleural effusions.  *** No previous Echo exam.    ASSESSMENT AND PLAN:  64 yo F w/ DM, HTN, and CVA (requiring cane to ambulate, but no focal deficits) presents to OSH w/ 1 day onset of SOB abdominal pain. EKG at OSH  showed possible ALAN in lead III and ST depressions in V2-V5. The troponin at the OSH was 1.15, nd propBNP of 4000. She was given lasix, ASA/Brillinta loaded, and started on heparin drip. She was transferred to [facility] for HLOC given concern for NSTEMI. At cath lab holding area and was noted to be dsypneic, abdominal breathing with rales, sat 98% on NC. Evaluated by Dr. Dukes who was concerned for HF exacebration, with postponement of cath. Given lasix 40mg with good output. Transferred to CCU2 for close monitoring.       #Neuro  - AxO3, no focal deficits.   - Hx of CVA (2019) w/ deficits.     #Resp  Hypoxic resp failure exacerbation 2/2 HF ex  - Xray 10/20 w/ b/l pleural effusions.   - On NC 2L, saturating 99%.   - Wean off as tolerated.     #CV  - NSTEMI - EKG changes on admission and elevated trops. On ASA 81mg daily, Brillinta 90mg BID, and heparin drip, Atorvastatin 80mg . Trending troponins and ckmb.   - Acute heart failure exacerbation- Elevated pro-bnp >5000,  TTE 33%, moderate MR, mild aortic stenosis, Severe segmental left ventricular systolic dysfunction.  Holding BB in the setting of HF exacerbation.   - HTN c/w lisinopril. Holding amlodipine.     #GI  - Abdominal pain, resolved.   - Transaminitis on admission, possibly cardiac related.   - Repeat CMP in the am.     #ID  - No issues.     #Renal  - No issues.  - Replete electrolytes prn.     #Heme  - Hgb downtrending from 11 - 10.     #Endo  - A1c of 7.5  - Consistent carb and SSI     #DVT PPx  - on heparin drip     Dispo- pending cath and PT recs. CCU2 accept note     PATIENT: Simran Wise     CHIEF COMPLAINT:  Patient is a 63y old  Female who presents with a chief complaint of NSTEMI (21 Oct 2019 13:47)    HPI / INTERVAL HISTORY:    Pt is a 64 yo female with DM, HTN, and CVA (requiring cane to ambulate, but no focal deficits)who presented to [facility] with a several hour history of shortness of breath and abdominal pain. Patient states these symptoms started around 4 pmyesterday to this. Prior to this day, patient denies having any similar symptoms prior to yesterday. She had an EKG at [facility] that showed ALAN in lead III and ST depressions in V2-V5. The troponing at the OSH was 1.15, nd propBNP of 4000. She was given lasix, ASA/Brillinta loaded, and started on heparin drip. She was transferred to [facility] for HLOC given concern for NSTEMI.    Hospital course: Patient continued on ASA, Brillinta and heparin drip for NSTEMI. Patient was brought to the cath lab holding area and was noted to be dsypneic, abdominal breathing with rales, sat 98% on NC. Evaluated by Dr. Dukes, with postponement of cath. Given lasix 40mg x 1, w/ 400cc void. Transferred to CCU2 for close monitoring.      ALLERGIES:  No Known Allergies    MEDICATIONS:  aspirin enteric coated 81 milliGRAM(s) Oral daily  atorvastatin 80 milliGRAM(s) Oral at bedtime  dextrose 40% Gel 15 Gram(s) Oral once PRN  dextrose 5%. 1000 milliLiter(s) IV Continuous <Continuous>  dextrose 50% Injectable 12.5 Gram(s) IV Push once  dextrose 50% Injectable 25 Gram(s) IV Push once  dextrose 50% Injectable 25 Gram(s) IV Push once  glucagon  Injectable 1 milliGRAM(s) IntraMuscular once PRN  heparin  Infusion. 950 Unit(s)/Hr IV Continuous <Continuous>  heparin  Injectable 3500 Unit(s) IV Push every 6 hours PRN  influenza   Vaccine 0.5 milliLiter(s) IntraMuscular once  insulin lispro (HumaLOG) corrective regimen sliding scale   SubCutaneous at bedtime  insulin lispro (HumaLOG) corrective regimen sliding scale   SubCutaneous three times a day before meals  lisinopril 10 milliGRAM(s) Oral daily  ticagrelor 90 milliGRAM(s) Oral every 12 hours    OBJECTIVE:  ICU Vital Signs Last 24 Hrs  T(C): 36.9 (21 Oct 2019 19:00), Max: 36.9 (21 Oct 2019 19:00)  T(F): 98.5 (21 Oct 2019 19:00), Max: 98.5 (21 Oct 2019 19:00)  HR: 100 (21 Oct 2019 19:00) (68 - 109)  BP: 140/84 (21 Oct 2019 19:00) (123/81 - 147/86)  BP(mean): 107 (21 Oct 2019 19:00) (107 - 111)  RR: 27 (21 Oct 2019 19:00) (18 - 29)  SpO2: 98% (21 Oct 2019 19:00) (97% - 100%)      POCT Blood Glucose.: 216 mg/dL (21 Oct 2019 12:42)  POCT Blood Glucose.: 168 mg/dL (21 Oct 2019 08:36)  POCT Blood Glucose.: 219 mg/dL (20 Oct 2019 22:01)    CAPILLARY BLOOD GLUCOSE  POCT Blood Glucose.: 216 mg/dL (21 Oct 2019 12:42)    I&O's Summary    20 Oct 2019 07:01  -  21 Oct 2019 07:00  --------------------------------------------------------  IN: 594 mL / OUT: 300 mL / NET: 294 mL    21 Oct 2019 07:01  -  21 Oct 2019 19:22  --------------------------------------------------------  IN: 856 mL / OUT: 0 mL / NET: 856 mL      Daily Height in cm: 162.56 (20 Oct 2019 20:00)    Daily Weight in k.7 (21 Oct 2019 03:50)    PHYSICAL EXAM:  Gen: NAD  HEENT: NC/AT; anicteric sclera  Neck: supple, no JVD  Resp: B/l rales at the bases.   Cardiovasc: S1S2 normal; RRR; no murmurs, rubs or gallops  GI: soft, nondistended, nontender; +BS  Extr: warm, well-perfused, PT/DP pulses 2+ B/L; no LE edema  Skin: normal color and turgor  Neuro: AxO3, no focal deficits.       LABS:                          10.2   8.58  )-----------( 409      ( 21 Oct 2019 15:01 )             31.9     10-21    144  |  107  |  20  ----------------------------<  163<H>  3.4<L>   |  22  |  0.81    Ca    8.1<L>      21 Oct 2019 06:30  Phos  3.9     10-21  Mg     2.0     10-21    TPro  7.7  /  Alb  3.5  /  TBili  0.4  /  DBili  x   /  AST  46<H>  /  ALT  22  /  AlkPhos  85  10-20    LIVER FUNCTIONS - ( 20 Oct 2019 01:50 )  Alb: 3.5 g/dL / Pro: 7.7 g/dL / ALK PHOS: 85 U/L / ALT: 22 U/L / AST: 46 U/L / GGT: x           PT/INR - ( 21 Oct 2019 06:30 )   PT: 12.9 sec;   INR: 1.12 ratio    PTT - ( 21 Oct 2019 15:01 )  PTT:66.0 sec    CARDIAC MARKERS ( 20 Oct 2019 06:18 )  x     / x     / 176 U/L / x     / 13.1 ng/mL    RADIOLOGY & ADDITIONAL TESTS:     < from: TTE with Doppler (w/Cont) (10.21.19 @ 10:52) >    Fractional short: 33 %  EF (Modified Flores Rule): 32 %Doppler Peak Velocity  (m/sec): AoV=1.1  ------------------------------------------------------------------------  Observations:  Mitral Valve: Mitral annular calcification and calcified  mitral leaflets with normal diastolic opening. Moderate  mitral regurgitation.  Aortic Valve/Aorta: Calcified trileaflet aortic valve with  decreased opening. Peak transaortic valve gradient equals 5  mm Hg, mean transaortic valve gradient equals 3 mm Hg,  estimated aortic valve area equals 1.9 sqcm (by continuity  equation), aortic valve velocity time integral equals 21  cm, consistent with mild aortic stenosis. No aortic valve  regurgitation seen. Peak left ventricular outflow tract  gradient equals 1 mm Hg, LVOT velocity time integral equals  11 cm.  Aortic Root: 2.8 cm.  Left Atrium: Normal left atrium.  LA volume index = 29  cc/m2.  Left Ventricle: Severe segmental left ventricular systolic  dysfunction. The mid anterior septum, the mid inferolateral  wall, the basal  inferolateral wall, the apical anterior  wall, the apical lateral wall, the basal inferior wall, the  basal anterolateral wall, the mid inferior wall, the mid  anterolateral wall, and the mid inferoseptum are  hypokinetic. The apical inferior wall, the apical septum,  and the apical cap are akinetic.  Normal left ventricular  internal dimensions and wall thicknesses. Mild diastolic  dysfunction (Stage I).  Right Heart: Normal right atrium. Normal right ventricular  size and function. Normal tricuspid valve. Mild tricuspid  regurgitation. Pulmonic valve not well visualized, probably  normal.  Pericardium/Pleura: Normal pericardium with no pericardial  effusion.  Bilateral pleural effusions.  ------------------------------------------------------------------------  Conclusions:  1. Mitral annular calcification and calcified mitral  leaflets with normal diastolic opening. Moderate mitral  regurgitation.  2. Calcified trileaflet aortic valve with decreased  opening. Peak transaortic valve gradient equals 5 mm Hg,  mean transaortic valve gradient equals 3 mm Hg, estimated  aortic valve area equals 1.9 sqcm (by continuity equation),  aortic valve velocity time integral equals 21 cm,  consistent with mild aortic stenosis. No aortic valve  regurgitation seen.  3. Severe segmental left ventricular systolic dysfunction.  The mid anterior septum, the mid inferolateral wall, the  basal  inferolateral wall, the apical anterior wall, the  apical lateral wall, the basal inferior wall, the basal  anterolateral wall, the mid inferior wall, the mid  anterolateral wall, and the mid inferoseptum are  hypokinetic. The apical inferior wall, the apical septum,  and the apical cap are akinetic.  4. Mild diastolic dysfunction (Stage I).  5. Normal right ventricular size and function.  6. Normal pericardium with no pericardial effusion.  7. Bilateralpleural effusions.  *** No previous Echo exam.    ASSESSMENT AND PLAN:  64 yo F w/ DM, HTN, and CVA (requiring cane to ambulate, but no focal deficits) presents to OSH w/ 1 day onset of SOB abdominal pain. EKG at OSH  showed possible ALAN in lead III and ST depressions in V2-V5. The troponin at the OSH was 1.15, nd propBNP of 4000. She was given lasix, ASA/Brillinta loaded, and started on heparin drip. She was transferred to [facility] for HLOC given concern for NSTEMI. At cath lab holding area and was noted to be dsypneic, abdominal breathing with rales, sat 98% on NC. Evaluated by Dr. Dukes who was concerned for HF exacebration, with postponement of cath. Given lasix 40mg with good output. Transferred to CCU2 for close monitoring.       #Neuro  - AxO3, no focal deficits.   - Hx of CVA (2019) w/ deficits.     #Resp  Hypoxic resp failure exacerbation 2/2 HF ex  - Xray 10/ w/ b/l pleural effusions.   - On NC 2L, saturating 99%.   - Wean off as tolerated.     #CV  - NSTEMI - EKG changes on admission and elevated trops. On ASA 81mg daily, Brillinta 90mg BID, and heparin drip, Atorvastatin 80mg . Trending troponins and ckmb.   - Acute heart failure exacerbation- Elevated pro-bnp >5000,  TTE 33%, moderate MR, mild aortic stenosis, Severe segmental left ventricular systolic dysfunction.  Holding BB in the setting of HF exacerbation.   - HTN c/w lisinopril. Holding amlodipine.     #GI  - Abdominal pain, resolved.   - Transaminitis on admission, possibly cardiac related.   - Repeat CMP in the am.     #ID  - No issues.     #Renal  - No issues.  - Replete electrolytes prn.     #Heme  - Hgb downtrending from 11 - 10.     #Endo  - A1c of 7.5  - Consistent carb and SSI     #DVT PPx  - on heparin drip     Dispo- pending cath and PT recs. CCU2 accept note     PATIENT: Simran Wise     CHIEF COMPLAINT:  Patient is a 63y old  Female who presents with a chief complaint of NSTEMI (21 Oct 2019 13:47)    HPI / INTERVAL HISTORY:    Pt is a 62 yo female with DM, HTN, and CVA (requiring cane to ambulate, but no focal deficits)who presented to [facility] with a several hour history of shortness of breath and abdominal pain. Patient states these symptoms started around 4 pmyesterday to this. Prior to this day, patient denies having any similar symptoms prior to yesterday. She had an EKG at OSh that showed ALAN in lead III and ST depressions in V2-V5. The troponing at the OSH was 1.15, nd propBNP of 4000. She was given lasix, ASA/Brillinta loaded, and started on heparin drip. She was transferred to [facility] for HLOC given concern for NSTEMI.    Hospital course: Patient continued on ASA, Brillinta and heparin drip for NSTEMI. Patient was brought to the cath lab holding area and was noted to be dsypneic, abdominal breathing with rales, sat 98% on NC. Evaluated by Dr. Dukes, with postponement of cath. Given lasix 40mg x 1, w/ 400cc void. Transferred to CCU2 for close monitoring. S/p LHC showing pLAD 100%, mCX 90%, dRCA 100%. Brilinta was discontinued, last dose given on 10/22 at 5AM. Patient is pending CABG by Dr. Momin on 10/25 (Friday).    ALLERGIES:  No Known Allergies    MEDICATIONS:  aspirin enteric coated 81 milliGRAM(s) Oral daily  atorvastatin 80 milliGRAM(s) Oral at bedtime  dextrose 40% Gel 15 Gram(s) Oral once PRN  dextrose 5%. 1000 milliLiter(s) IV Continuous <Continuous>  dextrose 50% Injectable 12.5 Gram(s) IV Push once  dextrose 50% Injectable 25 Gram(s) IV Push once  dextrose 50% Injectable 25 Gram(s) IV Push once  glucagon  Injectable 1 milliGRAM(s) IntraMuscular once PRN  heparin  Infusion. 950 Unit(s)/Hr IV Continuous <Continuous>  heparin  Injectable 3500 Unit(s) IV Push every 6 hours PRN  influenza   Vaccine 0.5 milliLiter(s) IntraMuscular once  insulin lispro (HumaLOG) corrective regimen sliding scale   SubCutaneous at bedtime  insulin lispro (HumaLOG) corrective regimen sliding scale   SubCutaneous three times a day before meals  lisinopril 10 milliGRAM(s) Oral daily  ticagrelor 90 milliGRAM(s) Oral every 12 hours    OBJECTIVE:  ICU Vital Signs Last 24 Hrs  T(C): 36.9 (21 Oct 2019 19:00), Max: 36.9 (21 Oct 2019 19:00)  T(F): 98.5 (21 Oct 2019 19:00), Max: 98.5 (21 Oct 2019 19:00)  HR: 100 (21 Oct 2019 19:00) (68 - 109)  BP: 140/84 (21 Oct 2019 19:00) (123/81 - 147/86)  BP(mean): 107 (21 Oct 2019 19:00) (107 - 111)  RR: 27 (21 Oct 2019 19:00) (18 - 29)  SpO2: 98% (21 Oct 2019 19:00) (97% - 100%)      POCT Blood Glucose.: 216 mg/dL (21 Oct 2019 12:42)  POCT Blood Glucose.: 168 mg/dL (21 Oct 2019 08:36)  POCT Blood Glucose.: 219 mg/dL (20 Oct 2019 22:01)    CAPILLARY BLOOD GLUCOSE  POCT Blood Glucose.: 216 mg/dL (21 Oct 2019 12:42)    I&O's Summary    20 Oct 2019 07:  -  21 Oct 2019 07:00  --------------------------------------------------------  IN: 594 mL / OUT: 300 mL / NET: 294 mL    21 Oct 2019 07:01  -  21 Oct 2019 19:22  --------------------------------------------------------  IN: 856 mL / OUT: 0 mL / NET: 856 mL      Daily Height in cm: 162.56 (20 Oct 2019 20:00)    Daily Weight in k.7 (21 Oct 2019 03:50)    PHYSICAL EXAM:  Gen: NAD  HEENT: NC/AT; anicteric sclera  Neck: supple, no JVD  Resp: B/l rales at the bases.   Cardiovasc: S1S2 normal; RRR; no murmurs, rubs or gallops  GI: soft, nondistended, nontender; +BS  Extr: warm, well-perfused, PT/DP pulses 2+ B/L; no LE edema  Skin: normal color and turgor  Neuro: AxO3, no focal deficits.       LABS:                          10.2   8.58  )-----------( 409      ( 21 Oct 2019 15:01 )             31.9     10-21    144  |  107  |  20  ----------------------------<  163<H>  3.4<L>   |  22  |  0.81    Ca    8.1<L>      21 Oct 2019 06:30  Phos  3.9     10-21  Mg     2.0     10-21    TPro  7.7  /  Alb  3.5  /  TBili  0.4  /  DBili  x   /  AST  46<H>  /  ALT  22  /  AlkPhos  85  10-20    LIVER FUNCTIONS - ( 20 Oct 2019 01:50 )  Alb: 3.5 g/dL / Pro: 7.7 g/dL / ALK PHOS: 85 U/L / ALT: 22 U/L / AST: 46 U/L / GGT: x           PT/INR - ( 21 Oct 2019 06:30 )   PT: 12.9 sec;   INR: 1.12 ratio    PTT - ( 21 Oct 2019 15:01 )  PTT:66.0 sec    CARDIAC MARKERS ( 20 Oct 2019 06:18 )  x     / x     / 176 U/L / x     / 13.1 ng/mL    RADIOLOGY & ADDITIONAL TESTS:     < from: TTE with Doppler (w/Cont) (10.21.19 @ 10:52) >    Fractional short: 33 %  EF (Modified Flores Rule): 32 %Doppler Peak Velocity  (m/sec): AoV=1.1  ------------------------------------------------------------------------  Observations:  Mitral Valve: Mitral annular calcification and calcified  mitral leaflets with normal diastolic opening. Moderate  mitral regurgitation.  Aortic Valve/Aorta: Calcified trileaflet aortic valve with  decreased opening. Peak transaortic valve gradient equals 5  mm Hg, mean transaortic valve gradient equals 3 mm Hg,  estimated aortic valve area equals 1.9 sqcm (by continuity  equation), aortic valve velocity time integral equals 21  cm, consistent with mild aortic stenosis. No aortic valve  regurgitation seen. Peak left ventricular outflow tract  gradient equals 1 mm Hg, LVOT velocity time integral equals  11 cm.  Aortic Root: 2.8 cm.  Left Atrium: Normal left atrium.  LA volume index = 29  cc/m2.  Left Ventricle: Severe segmental left ventricular systolic  dysfunction. The mid anterior septum, the mid inferolateral  wall, the basal  inferolateral wall, the apical anterior  wall, the apical lateral wall, the basal inferior wall, the  basal anterolateral wall, the mid inferior wall, the mid  anterolateral wall, and the mid inferoseptum are  hypokinetic. The apical inferior wall, the apical septum,  and the apical cap are akinetic.  Normal left ventricular  internal dimensions and wall thicknesses. Mild diastolic  dysfunction (Stage I).  Right Heart: Normal right atrium. Normal right ventricular  size and function. Normal tricuspid valve. Mild tricuspid  regurgitation. Pulmonic valve not well visualized, probably  normal.  Pericardium/Pleura: Normal pericardium with no pericardial  effusion.  Bilateral pleural effusions.  ------------------------------------------------------------------------  Conclusions:  1. Mitral annular calcification and calcified mitral  leaflets with normal diastolic opening. Moderate mitral  regurgitation.  2. Calcified trileaflet aortic valve with decreased  opening. Peak transaortic valve gradient equals 5 mm Hg,  mean transaortic valve gradient equals 3 mm Hg, estimated  aortic valve area equals 1.9 sqcm (by continuity equation),  aortic valve velocity time integral equals 21 cm,  consistent with mild aortic stenosis. No aortic valve  regurgitation seen.  3. Severe segmental left ventricular systolic dysfunction.  The mid anterior septum, the mid inferolateral wall, the  basal  inferolateral wall, the apical anterior wall, the  apical lateral wall, the basal inferior wall, the basal  anterolateral wall, the mid inferior wall, the mid  anterolateral wall, and the mid inferoseptum are  hypokinetic. The apical inferior wall, the apical septum,  and the apical cap are akinetic.  4. Mild diastolic dysfunction (Stage I).  5. Normal right ventricular size and function.  6. Normal pericardium with no pericardial effusion.  7. Bilateralpleural effusions.  *** No previous Echo exam.    ASSESSMENT AND PLAN:  62 yo F w/ DM, HTN, and CVA (requiring cane to ambulate, but no focal deficits) presents to OSH w/ 1 day onset of SOB abdominal pain. EKG at OSH  showed possible ALAN in lead III and ST depressions in V2-V5. The troponin at the OSH was 1.15, nd propBNP of 4000. She was given lasix, ASA/Brillinta loaded, and started on heparin drip. She was transferred to [facility] for HLOC given concern for NSTEMI. At cath lab holding area and was noted to be dsypneic, abdominal breathing with rales, sat 98% on NC. Evaluated by Dr. Dukes who was concerned for HF exacebration, with postponement of cath. Given lasix 40mg with good output. Transferred to CCU2 for close monitoring.       #Neuro  - AxO3, no focal deficits.   - Hx of CVA (2019) w/ deficits.     #Resp  Hypoxic resp failure exacerbation 2/2 HF ex  - Xray 10/20 w/ b/l pleural effusions.   - On NC 2L, saturating 99%.   - Wean off as tolerated.     #CV  - NSTEMI - EKG changes on admission and elevated trops. s/p LHC showing TVD, pending CABG on 10/25.  - HTN c/w lisinopril. Holding amlodipine.     #GI  - Abdominal pain, resolved.   - Transaminitis on admission, possibly cardiac related.   - Repeat CMP in the am.     #ID  - No issues.     #Renal  - No issues.  - Replete electrolytes prn.     #Heme  - Hgb stable.    #Endo  - A1c of 7.5  - Consistent carb and SSI     #DVT PPx  - on heparin drip     Antwon Partida, #[phone]  Cardiology Fellow

## 2019-10-21 NOTE — PROGRESS NOTE ADULT - ASSESSMENT
62 YO F with HTN, HLD, CVA (jan 2019) presenting to OSH with sudden onset abdominal pain and SOB found to have NSTEMI transferred to [facility] for further 64 YO F with HTN, HLD, CVA (jan 2019) presenting to OSH with sudden onset abdominal pain and SOB found to have NSTEMI transferred to [facility] for further management

## 2019-10-21 NOTE — PROGRESS NOTE ADULT - ATTENDING COMMENTS
NSTEMI s/p heparin gtt, plan for LHC and TTE  appreciate naya Cabrera MD  Division of Hospital Medicine  Pager: [phone]  Office: [phone]

## 2019-10-21 NOTE — PROGRESS NOTE ADULT - PROBLEM SELECTOR PLAN 1
Pt with ST segment changes on V2-V5, troponins 1340, BNP 5799,   - Cardiology consulted. Appreciate recs  - S/p ASA, brillinita, and heparin load  - C/w ASA 81 mg q Day  - C/w Brilinita  - C/w Heparin gtt  - Monitor on telemetry  - Trend cardiac enzymes until downtrending  - Echo  - Maintain euvolemia by dosing lasix daily  - pleural effusions on CXR,   -holding off on beta blocker pending echo results (concern for decompensated HF) Pt with ST segment changes on V2-V5, troponins 1340, BNP 5799,   - S/p ASA, brillinita, and heparin load  -plan for [facility] today 10/21 if patient is able to lie flat   - C/w ASA 81 mg q Day  - C/w Brilinita  - C/w Heparin gtt  - Monitor on telemetry  - Echo  - Maintain euvolemia by dosing lasix daily  - pleural effusions on CXR,   -holding off on beta blocker pending echo results (concern for decompensated HF)  -trend CKMB

## 2019-10-22 DIAGNOSIS — I25.10 ATHEROSCLEROTIC HEART DISEASE OF NATIVE CORONARY ARTERY WITHOUT ANGINA PECTORIS: ICD-10-CM

## 2019-10-22 LAB
ANION GAP SERPL CALC-SCNC: 13 MMOL/L — SIGNIFICANT CHANGE UP (ref 5–17)
APTT BLD: 61 SEC — HIGH (ref 27.5–36.3)
APTT BLD: 71 SEC — HIGH (ref 27.5–36.3)
BUN SERPL-MCNC: 17 MG/DL — SIGNIFICANT CHANGE UP (ref 7–23)
CALCIUM SERPL-MCNC: 8.4 MG/DL — SIGNIFICANT CHANGE UP (ref 8.4–10.5)
CHLORIDE SERPL-SCNC: 106 MMOL/L — SIGNIFICANT CHANGE UP (ref 96–108)
CO2 SERPL-SCNC: 22 MMOL/L — SIGNIFICANT CHANGE UP (ref 22–31)
CREAT SERPL-MCNC: 0.73 MG/DL — SIGNIFICANT CHANGE UP (ref 0.5–1.3)
FERRITIN SERPL-MCNC: 113 NG/ML — SIGNIFICANT CHANGE UP (ref 15–150)
GLUCOSE BLDC GLUCOMTR-MCNC: 154 MG/DL — HIGH (ref 70–99)
GLUCOSE BLDC GLUCOMTR-MCNC: 160 MG/DL — HIGH (ref 70–99)
GLUCOSE BLDC GLUCOMTR-MCNC: 202 MG/DL — HIGH (ref 70–99)
GLUCOSE BLDC GLUCOMTR-MCNC: 215 MG/DL — HIGH (ref 70–99)
GLUCOSE BLDC GLUCOMTR-MCNC: 295 MG/DL — HIGH (ref 70–99)
GLUCOSE SERPL-MCNC: 197 MG/DL — HIGH (ref 70–99)
HCT VFR BLD CALC: 32 % — LOW (ref 34.5–45)
HGB BLD-MCNC: 10.2 G/DL — LOW (ref 11.5–15.5)
INR BLD: 1.16 RATIO — SIGNIFICANT CHANGE UP (ref 0.88–1.16)
IRON SATN MFR SERPL: 18 % — SIGNIFICANT CHANGE UP (ref 14–50)
IRON SATN MFR SERPL: 44 UG/DL — SIGNIFICANT CHANGE UP (ref 30–160)
MAGNESIUM SERPL-MCNC: 1.9 MG/DL — SIGNIFICANT CHANGE UP (ref 1.6–2.6)
MCHC RBC-ENTMCNC: 25 PG — LOW (ref 27–34)
MCHC RBC-ENTMCNC: 31.9 GM/DL — LOW (ref 32–36)
MCV RBC AUTO: 78.4 FL — LOW (ref 80–100)
NRBC # BLD: 0 /100 WBCS — SIGNIFICANT CHANGE UP (ref 0–0)
PHOSPHATE SERPL-MCNC: 3.6 MG/DL — SIGNIFICANT CHANGE UP (ref 2.5–4.5)
PLATELET # BLD AUTO: 426 K/UL — HIGH (ref 150–400)
POTASSIUM SERPL-MCNC: 4 MMOL/L — SIGNIFICANT CHANGE UP (ref 3.5–5.3)
POTASSIUM SERPL-SCNC: 4 MMOL/L — SIGNIFICANT CHANGE UP (ref 3.5–5.3)
PROTHROM AB SERPL-ACNC: 13.3 SEC — HIGH (ref 10–12.9)
RBC # BLD: 4.08 M/UL — SIGNIFICANT CHANGE UP (ref 3.8–5.2)
RBC # FLD: 13.9 % — SIGNIFICANT CHANGE UP (ref 10.3–14.5)
SODIUM SERPL-SCNC: 141 MMOL/L — SIGNIFICANT CHANGE UP (ref 135–145)
TIBC SERPL-MCNC: 251 UG/DL — SIGNIFICANT CHANGE UP (ref 220–430)
TSH SERPL-MCNC: 0.74 UIU/ML — SIGNIFICANT CHANGE UP (ref 0.27–4.2)
UIBC SERPL-MCNC: 207 UG/DL — SIGNIFICANT CHANGE UP (ref 110–370)
WBC # BLD: 9.73 K/UL — SIGNIFICANT CHANGE UP (ref 3.8–10.5)
WBC # FLD AUTO: 9.73 K/UL — SIGNIFICANT CHANGE UP (ref 3.8–10.5)

## 2019-10-22 PROCEDURE — 93010 ELECTROCARDIOGRAM REPORT: CPT

## 2019-10-22 PROCEDURE — 99291 CRITICAL CARE FIRST HOUR: CPT | Mod: 25

## 2019-10-22 PROCEDURE — 70450 CT HEAD/BRAIN W/O DYE: CPT | Mod: 26

## 2019-10-22 PROCEDURE — 71250 CT THORAX DX C-: CPT | Mod: 26

## 2019-10-22 PROCEDURE — 93460 R&L HRT ART/VENTRICLE ANGIO: CPT | Mod: 26,GC

## 2019-10-22 PROCEDURE — 99222 1ST HOSP IP/OBS MODERATE 55: CPT

## 2019-10-22 RX ORDER — DEXTROSE 10 % IN WATER 10 %
125 INTRAVENOUS SOLUTION INTRAVENOUS ONCE
Refills: 0 | Status: DISCONTINUED | OUTPATIENT
Start: 2019-10-22 | End: 2019-10-25

## 2019-10-22 RX ORDER — ACETAMINOPHEN 500 MG
650 TABLET ORAL EVERY 6 HOURS
Refills: 0 | Status: DISCONTINUED | OUTPATIENT
Start: 2019-10-22 | End: 2019-10-25

## 2019-10-22 RX ORDER — METOPROLOL TARTRATE 50 MG
12.5 TABLET ORAL
Refills: 0 | Status: DISCONTINUED | OUTPATIENT
Start: 2019-10-22 | End: 2019-10-25

## 2019-10-22 RX ORDER — INSULIN LISPRO 100/ML
5 VIAL (ML) SUBCUTANEOUS
Refills: 0 | Status: DISCONTINUED | OUTPATIENT
Start: 2019-10-22 | End: 2019-10-23

## 2019-10-22 RX ORDER — HEPARIN SODIUM 5000 [USP'U]/ML
950 INJECTION INTRAVENOUS; SUBCUTANEOUS
Qty: 25000 | Refills: 0 | Status: DISCONTINUED | OUTPATIENT
Start: 2019-10-22 | End: 2019-10-25

## 2019-10-22 RX ORDER — HEPARIN SODIUM 5000 [USP'U]/ML
3500 INJECTION INTRAVENOUS; SUBCUTANEOUS EVERY 6 HOURS
Refills: 0 | Status: DISCONTINUED | OUTPATIENT
Start: 2019-10-22 | End: 2019-10-25

## 2019-10-22 RX ORDER — DEXTROSE 10 % IN WATER 10 %
250 INTRAVENOUS SOLUTION INTRAVENOUS ONCE
Refills: 0 | Status: DISCONTINUED | OUTPATIENT
Start: 2019-10-22 | End: 2019-10-25

## 2019-10-22 RX ORDER — INSULIN GLARGINE 100 [IU]/ML
8 INJECTION, SOLUTION SUBCUTANEOUS AT BEDTIME
Refills: 0 | Status: DISCONTINUED | OUTPATIENT
Start: 2019-10-22 | End: 2019-10-23

## 2019-10-22 RX ADMIN — ATORVASTATIN CALCIUM 80 MILLIGRAM(S): 80 TABLET, FILM COATED ORAL at 21:22

## 2019-10-22 RX ADMIN — Medication 81 MILLIGRAM(S): at 11:05

## 2019-10-22 RX ADMIN — Medication 2: at 17:43

## 2019-10-22 RX ADMIN — Medication 12.5 MILLIGRAM(S): at 17:43

## 2019-10-22 RX ADMIN — Medication 40 MILLIGRAM(S): at 04:59

## 2019-10-22 RX ADMIN — HEPARIN SODIUM 950 UNIT(S)/HR: 5000 INJECTION INTRAVENOUS; SUBCUTANEOUS at 03:53

## 2019-10-22 RX ADMIN — Medication 3: at 11:05

## 2019-10-22 RX ADMIN — LISINOPRIL 10 MILLIGRAM(S): 2.5 TABLET ORAL at 05:16

## 2019-10-22 RX ADMIN — MAGNESIUM OXIDE 400 MG ORAL TABLET 400 MILLIGRAM(S): 241.3 TABLET ORAL at 08:36

## 2019-10-22 RX ADMIN — Medication 2: at 08:36

## 2019-10-22 RX ADMIN — MAGNESIUM OXIDE 400 MG ORAL TABLET 400 MILLIGRAM(S): 241.3 TABLET ORAL at 17:42

## 2019-10-22 RX ADMIN — Medication 650 MILLIGRAM(S): at 19:35

## 2019-10-22 RX ADMIN — MAGNESIUM OXIDE 400 MG ORAL TABLET 400 MILLIGRAM(S): 241.3 TABLET ORAL at 11:37

## 2019-10-22 RX ADMIN — Medication 650 MILLIGRAM(S): at 19:02

## 2019-10-22 RX ADMIN — TICAGRELOR 90 MILLIGRAM(S): 90 TABLET ORAL at 05:16

## 2019-10-22 RX ADMIN — HEPARIN SODIUM 950 UNIT(S)/HR: 5000 INJECTION INTRAVENOUS; SUBCUTANEOUS at 19:55

## 2019-10-22 NOTE — CONSULT NOTE ADULT - PROBLEM SELECTOR RECOMMENDATION 3
Suggest to continue medications, monitoring, FU primary team recommendations. .
Check non-con Head CT

## 2019-10-22 NOTE — PROGRESS NOTE ADULT - SUBJECTIVE AND OBJECTIVE BOX
Admission date:  CHIEF COMPLAINT:  HPI:  Pt is a 62 yo female with DM, HTN, and CVA (requiring cane to ambulate, but no focal deficits)who presented to [facility] with a several hour history of shortness of breath and abdominal pain. Patient states these symptoms started around 4 pmyesterday to this. Prior to this day, patient denies having any similar symptoms prior to yesterday. She had an EKG at OSh that showed ALAN in lead III and ST depressions in V2-V5. The troponing at the OSH was 1.15, nd propBNP of 4000. She was given lasix, ASA/Brillinta loaded, and started on heparin drip. She was transferred to [facility] for HLOC given concern for NSTEMI. (20 Oct 2019 05:44)    INTERVAL HISTORY: Patient seen and examined, denies CP, dyspnea, orthopnea, PND, palpitations and able to lay flat. NARD noted.    REVIEW OF SYSTEMS:    CONSTITUTIONAL: No weakness, fevers or chills  EYES/ENT: No visual changes;  No vertigo or throat pain   NECK: No pain or stiffness  RESPIRATORY: No cough, wheezing, hemoptysis; No shortness of breath  CARDIOVASCULAR: No chest pain or palpitations  GASTROINTESTINAL: No abdominal or epigastric pain. No nausea, vomiting, or hematemesis; No diarrhea or constipation. No melena or hematochezia.  GENITOURINARY: No dysuria, frequency or hematuria  NEUROLOGICAL: No numbness or weakness  SKIN: No itching, rashes      MEDICATIONS  (STANDING):  aspirin enteric coated 81 milliGRAM(s) Oral daily  atorvastatin 80 milliGRAM(s) Oral at bedtime  dextrose 5%. 1000 milliLiter(s) (50 mL/Hr) IV Continuous <Continuous>  dextrose 50% Injectable 12.5 Gram(s) IV Push once  dextrose 50% Injectable 25 Gram(s) IV Push once  dextrose 50% Injectable 25 Gram(s) IV Push once  heparin  Infusion. 950 Unit(s)/Hr (9.5 mL/Hr) IV Continuous <Continuous>  influenza   Vaccine 0.5 milliLiter(s) IntraMuscular once  insulin lispro (HumaLOG) corrective regimen sliding scale   SubCutaneous at bedtime  insulin lispro (HumaLOG) corrective regimen sliding scale   SubCutaneous three times a day before meals  lisinopril 10 milliGRAM(s) Oral daily  magnesium oxide 400 milliGRAM(s) Oral three times a day with meals  ticagrelor 90 milliGRAM(s) Oral every 12 hours    MEDICATIONS  (PRN):  dextrose 40% Gel 15 Gram(s) Oral once PRN Blood Glucose LESS THAN 70 milliGRAM(s)/deciliter  glucagon  Injectable 1 milliGRAM(s) IntraMuscular once PRN Glucose LESS THAN 70 milligrams/deciliter  heparin  Injectable 3500 Unit(s) IV Push every 6 hours PRN For aPTT less than 40      Objective:  ICU Vital Signs Last 24 Hrs  T(C): 36.8 (22 Oct 2019 05:00), Max: 36.9 (21 Oct 2019 19:00)  T(F): 98.2 (22 Oct 2019 05:00), Max: 98.5 (21 Oct 2019 19:00)  HR: 94 (22 Oct 2019 06:00) (86 - 113)  BP: 142/82 (22 Oct 2019 06:00) (121/74 - 147/86)  BP(mean): 107 (22 Oct 2019 06:00) (92 - 111)  ABP: --  ABP(mean): --  RR: 22 (22 Oct 2019 06:00) (18 - 36)  SpO2: 96% (22 Oct 2019 06:00) (96% - 100%)      10-21 @ :01  -  10-22 @ 07:00  --------------------------------------------------------  IN: 1399.5 mL / OUT: 2470 mL / NET: -1070.5 mL    10-22 @ 07:01  -  10-22 @ 07:37  --------------------------------------------------------  IN: 0 mL / OUT: 450 mL / NET: -450 mL        Daily Weight in k.4 (22 Oct 2019 06:00)    PHYSICAL EXAM:  General: WN/WD NAD  Neurology: Awake, nonfocal, WALLS x 4  Eyes: Scleras clear, PERRLA/ EOMI, Gross vision intact  ENT:Gross hearing intact, grossly patent pharynx, no stridor  Neck: Neck supple, trachea midline, No JVD,   Respiratory: CTA B/L, No wheezing, rales, rhonchi  CV: RRR, S1S2, no murmurs, rubs or gallops  Abdominal: Soft, NT, ND +BS,   Extremities: No edema, + peripheral pulses  Skin: No Rashes, Hematoma, Ecchymosis  Lymphatic: No Neck, axilla, groin LAD  Psych: Oriented x 3, normal affect        TELEMETRY: 92 bpm, NSR    EKG:   < from: 12 Lead ECG (10.21.19 @ 07:39) >    Ventricular Rate 97 BPM    Atrial Rate 97 BPM    P-R Interval 154 ms    QRS Duration 86 ms    Q-T Interval 402 ms    QTC Calculation(Bezet) 510 ms    P Axis 47 degrees    R Axis 0 degrees    T Axis 62 degrees    Diagnosis Line NORMAL SINUS RHYTHM  POSSIBLE LEFT ATRIAL ENLARGEMENT    < end of copied text >    IMAGING:  < from: TTE with Doppler (w/Cont) (10.21.19 @ 10:52) >  Conclusions: EF 32%  1. Mitral annular calcification and calcified mitral  leaflets with normal diastolic opening. Moderate mitral  regurgitation.  2. Calcified trileaflet aortic valve with decreased  opening. Peak transaortic valve gradient equals 5 mm Hg,  mean transaortic valve gradient equals 3 mm Hg, estimated  aortic valve area equals 1.9 sqcm (by continuity equation),  aortic valve velocity time integral equals 21 cm,  consistent with mild aortic stenosis. No aortic valve  regurgitation seen.  3. Severe segmental left ventricular systolic dysfunction.  The mid anterior septum, the mid inferolateral wall, the  basal  inferolateral wall, the apical anterior wall, the  apical lateral wall, the basal inferior wall, the basal  anterolateral wall, the mid inferior wall, the mid  anterolateral wall, and the mid inferoseptum are  hypokinetic. The apical inferior wall, the apical septum,  and the apical cap are akinetic.  4. Mild diastolic dysfunction (Stage I).  5. Normal right ventricular size and function.  6. Normal pericardium with no pericardial effusion.  7. Bilateralpleural effusions.  *** No previous Echo exam.    < end of copied text >    Labs:                          10.2   9.73  )-----------( 426      ( 22 Oct 2019 02:56 )             32.0     10-22    141  |  106  |  17  ----------------------------<  197<H>  4.0   |  22  |  0.73    Ca    8.4      22 Oct 2019 02:56  Phos  3.6     10-22  Mg     1.9     10-22        PT/INR - ( 22 Oct 2019 02:56 )   PT: 13.3 sec;   INR: 1.16 ratio         PTT - ( 22 Oct 2019 02:56 )  PTT:71.0 sec  CKMB Units: 2.9 ng/mL (10-21 @ 20:54)        HEALTH ISSUES - PROBLEM Dx:  Hypertension: Hypertension  Need for prophylactic measure: Need for prophylactic measure  Medication management: Medication management  Cerebrovascular accident (CVA): Cerebrovascular accident (CVA)  Hyperlipidemia: Hyperlipidemia  Diabetes mellitus: Diabetes mellitus  NSTEMI (non-ST elevated myocardial infarction): NSTEMI (non-ST elevated myocardial infarction)

## 2019-10-22 NOTE — CONSULT NOTE ADULT - ASSESSMENT
Assessment  DMT2: 63y Female with DM T2 with hyperglycemia, was on oral meds now on insulin, blood sugars running high, no hypoglycemic episode,  eating meals, compliant with low carb diet.  CAD: Planing CABG, on medications, no chest pain, stable, monitored.  HTN: Controlled,  on antihypertensive medications.  HLD: On statin, compliant with  intake.          Mireya Niño MD  Cell: [phone]  Office: [phone]

## 2019-10-22 NOTE — PROGRESS NOTE ADULT - ASSESSMENT
62 YO F with HTN, HLD, CVA (jan 2019) presenting to OSH with sudden onset abdominal pain and SOB found to have NSTEMI transferred to [facility] for further management      Problem/Plan - 1:  ·  Problem: NSTEMI (non-ST elevated myocardial infarction).  Plan: Pt with ST segment changes on V2-V5, troponins 1340, BNP 5799,   - S/p ASA, brillinita, and heparin load  -plan for [facility] today 10/21 if patient is able to lie flat   - C/w ASA 81 mg q Day  - C/w Brilinita  - C/w Heparin gtt  - Monitor on telemetry  - Echo  - Maintain euvolemia by dosing lasix daily  - pleural effusions on CXR,   -holding off on beta blocker pending echo results (concern for decompensated HF)  -trend CKMB.      Problem/Plan - 2:  ·  Problem: Diabetes mellitus.  Plan: A1c 7.5  -ISS.      Problem/Plan - 3:  ·  Problem: Hyperlipidemia.  Plan: - Atorvastatin 80 mg qhs.      Problem/Plan - 4:  ·  Problem: Hypertension.  Plan: Patient on Lisinopril and Amlodpine per outpatient medication review  -lisinopril started.      Problem/Plan - 5:  ·  Problem: Cerebrovascular accident (CVA).  Plan: Patient with hx of CVA (residual deficits: patient requiring a cane to ambulate) Jan 2019  - ASA  - Atorvastatin 80 mg qhs.      Problem/Plan - 6:  Problem: Need for prophylactic measure. Plan: Diet: DASH/TLD with consistent carb  DVT Prophylaxis: Heparin gtt  Dispo: PT consulted. 62 YO F with HTN, HLD, CVA (jan 2019) presenting to OSH with sudden onset abdominal pain and SOB found to have NSTEMI transferred to [facility] for further management      Problem/Plan - 1:  ·  Problem: NSTEMI (non-ST elevated myocardial infarction).  Plan: Pt with ST segment changes on V2-V5, troponins 1340, BNP 5799,   - S/p ASA, brillinita, and heparin load  - C/w ASA 81 mg q Day  - CTsx d/c Brilinita  - d/c Heparin gtt  - Monitor on telemetry  -1o/21 TTE: EF 32%, mod MR, mild AR, wall motion abnormality, normal RV, b/l pleural effusions  - Maintain euvolemia by dosing lasix daily  - pleural effusions on CXR,   -holding off on beta blocker pending echo results (concern for decompensated HF)  -trend CKMB.   -For CTsx 10/25 with Dr. SHANNON Momin     Problem/Plan - 2:  ·  Problem: Diabetes mellitus.  Plan: A1c 7.5  -ISS.      Problem/Plan - 3:  ·  Problem: Hyperlipidemia.  Plan: - Atorvastatin 80 mg qhs.      Problem/Plan - 4:  ·  Problem: Hypertension.  Plan: Patient on Lisinopril and Amlodpine per outpatient medication review  -lisinopril started.      Problem/Plan - 5:  ·  Problem: Cerebrovascular accident (CVA).  Plan: Patient with hx of CVA (residual deficits: patient requiring a cane to ambulate) Jan 2019  - ASA  - Atorvastatin 80 mg qhs.      Problem/Plan - 6:  Problem: Need for prophylactic measure. Plan: Diet: DASH/TLD with consistent carb  DVT Prophylaxis: Heparin gtt  Dispo: PT consulted.

## 2019-10-22 NOTE — CHART NOTE - NSCHARTNOTEFT_GEN_A_CORE
====================  SUMMARY:  ====================  64 yo F with htn, hld, cva(jan/2019, no focal defictis) presented to [facility] for abd pain and sob, transferred to [facility] for NSTEMI / LHC. Cath postponed 2/2 dyspnea/orthopnea 2/2 HF exacerbation s/p Lasix. PCI done 10/22/19 reviled TVD, plan for CTS w/ Vatsia     ====================  NEW EVENTS:  ====================  PCI- pLAD 100%, mCX 90%, dRCA 100%. CTS consult placed, pre op imaging and labs ordered    ====================  VITALS:  ====================    ICU Vital Signs Last 24 Hrs  T(C): 37.1 (22 Oct 2019 19:00), Max: 37.1 (22 Oct 2019 19:00)  T(F): 98.8 (22 Oct 2019 19:00), Max: 98.8 (22 Oct 2019 19:00)  HR: 86 (22 Oct 2019 20:00) (81 - 113)  BP: 117/57 (22 Oct 2019 19:33) (106/69 - 145/83)  BP(mean): 81 (22 Oct 2019 19:33) (77 - 108)  ABP: --  ABP(mean): --  RR: 27 (22 Oct 2019 20:00) (17 - 36)  SpO2: 94% (22 Oct 2019 20:00) (93% - 100%)      I&O's Summary    21 Oct 2019 07:01  -  22 Oct 2019 07:00  --------------------------------------------------------  IN: 1399.5 mL / OUT: 2470 mL / NET: -1070.5 mL    22 Oct 2019 07:01  -  22 Oct 2019 21:01  --------------------------------------------------------  IN: 528 mL / OUT: 1000 mL / NET: -472 mL        ====================  LABS:  ====================                          10.2   9.73  )-----------( 426      ( 22 Oct 2019 02:56 )             32.0     10-22    141  |  106  |  17  ----------------------------<  197<H>  4.0   |  22  |  0.73    Ca    8.4      22 Oct 2019 02:56  Phos  3.6     10-22  Mg     1.9     10-22      PT/INR - ( 22 Oct 2019 02:56 )   PT: 13.3 sec;   INR: 1.16 ratio         PTT - ( 22 Oct 2019 11:01 )  PTT:61.0 sec        ====================  PLAN:  ====================  CAD  - TVD- pLAD 100%, mCX 90%, dRCA 100%. Tentative plan for CABG Friday 10/25/19 w/ Dr. Momin   - c/w ASA, Hep. Brillinta held  - c/w Lipitor, Lisinopril Metoprolol  - CT Head/Chest/Cartoid ultrasound. P2Y12 in AM    Lennox Chandler PAC [id number]

## 2019-10-22 NOTE — CONSULT NOTE ADULT - ASSESSMENT
64 y/o female with PMHx of DM2 - A1c 7.5, HTN, and CVA with residual right sided weakness requiring a cane to ambulate who presented to [facility] with c/o abd pain and SOB on 10/19. EKG at OSH showed ALAN in lead III and ST depressions in V2-V5 with elevated troponins. She was given lasix, ASA/Brillinta loaded, and started on heparin drip, then transferred to [facility] for further workup. Pt now s/p 10/22 [facility] demonstrating severe 3VD and CT Surgery consulted for CABG evaluation.

## 2019-10-22 NOTE — CONSULT NOTE ADULT - SUBJECTIVE AND OBJECTIVE BOX
History of Present Illness:  Pt is a 62 yo female with DM, HTN, and CVA (requiring cane to ambulate, but no focal deficits)who presented to [facility] with a several hour history of shortness of breath and abdominal pain. Patient states these symptoms started around 4 pmyesterday to this. Prior to this day, patient denies having any similar symptoms prior to yesterday. She had an EKG at OSh that showed ALAN in lead III and ST depressions in V2-V5. The troponing at the OSH was 1.15, nd propBNP of 4000. She was given lasix, ASA/Brillinta loaded, and started on heparin drip. She was transferred to [facility] for HLOC given concern for NSTEMI. (20 Oct 2019 05:44)       Past Medical History  Cerebrovascular accident (CVA)  Hyperlipidemia  Diabetes mellitus      Past Surgical History  No significant past surgical history      MEDICATIONS  (STANDING):  aspirin enteric coated 81 milliGRAM(s) Oral daily  atorvastatin 80 milliGRAM(s) Oral at bedtime  influenza   Vaccine 0.5 milliLiter(s) IntraMuscular once  insulin lispro (HumaLOG) corrective regimen sliding scale   SubCutaneous at bedtime  insulin lispro (HumaLOG) corrective regimen sliding scale   SubCutaneous three times a day before meals  lisinopril 10 milliGRAM(s) Oral daily  magnesium oxide 400 milliGRAM(s) Oral three times a day with meals      Vital Signs Last 24 Hrs  T(C): 36.3 (10-22-19 @ 14:30), Max: 36.9 (10-21-19 @ 19:00)  T(F): 97.4 (10-22-19 @ 14:30), Max: 98.5 (10-21-19 @ 19:00)  HR: 98 (10-22-19 @ 14:30) (81 - 113)  BP: 110/62 (10-22-19 @ 14:30) (106/69 - 147/86)  RR: 26 (10-22-19 @ 14:30) (17 - 36)  SpO2: 96% (10-22-19 @ 14:30) (93% - 100%)             Height (cm): 162.6    Weight (kg): 60.4    BMI (kg/m2): 22.8   (20 Oct 2019 20:00)      Allergies: No Known Allergies      SOCIAL HISTORY:  , lives with daughter, ambulates with cane   Smoker: [ ] Yes  [X] No                 Pt denies  ETOH use: [ ] Yes  [X] No              Pt denies  Ilicit Drug use:  [ ] Yes  [X] No       Pt denies    FAMILY HISTORY:  No pertinent family history in first degree relatives      Review of Systems  GENERAL:  no weakness, fatigue, fevers or chills  NEURO: no dizziness, numbness, tingling or weakness  SKIN: no itching, burning, rashes, or lesions   HEENT: no visual changes;  no headache, no vertigo, no recent colds  RESPIRATORY: no shortness of breath, no cough, sputum, wheezing  CARDIOVASCULAR:  no chest pain,  or palpitations  GI: no abd pain. no N/V/D.  PERIPHERAL VASCULAR: no swelling, no tenderness, no erythema      PHYSICAL EXAM  General: Well nourished, well developed, NAD.                                              Neuro: Normal exam oriented to person/place & time with no focal motor or sensory  deficits.                    Eyes: Normal exam of conjunctiva & lids, pupils equally reactive.   ENT: Normal exam of nasal/oral mucosa with absence of cyanosis.   Neck: Normal exam of jugular veins, trachea & thyroid.   Chest: Normal lung exam with good air movement absence of wheezes, rales, or rhonchi.                                                                         CV:  Auscultation: normal S1S2, RRR   Carotids: No Bruits[X]  Abdominal Aorta: normal [X] nonpalpable[X]                                                                         GI: Normal exam of abdomen with no noted masses or tenderness. +[id number]                                                                                            Extremities: Normal no evidence of cyanosis or deformity, Edema: none  Lower Extremity Pulses: Right[+2DP] Left[+2DP] Varicosities[none]  SKIN : Normal exam to inspection & palpation. Right groin CDI, no bleeding, no hematoma                                                            LABS:                        10.2   9.73  )-----------( 426      ( 22 Oct 2019 02:56 )             32.0     141  |  106  |  17  ----------------------------<  197<H>  4.0   |  22  |  0.73      PT/INR/PTT - ( 22 Oct 2019 02:56 )   PT: 13.3 sec;   INR: 1.16 ratio   PTT:61.0 sec      Cardiac Cath: 10/22 pending official report      < from: TTE with Doppler (w/Cont) (10.21.19 @ 10:52) >  Observations:  Mitral Valve: Mitral annular calcification and calcified  mitral leaflets with normal diastolic opening. Moderate  mitral regurgitation.  Aortic Valve/Aorta: Calcified trileaflet aortic valve with  decreased opening. Peak transaortic valve gradient equals 5  mm Hg, mean transaortic valve gradient equals 3 mm Hg,  estimated aortic valve area equals 1.9 sqcm (by continuity  equation), aortic valve velocity time integral equals 21  cm, consistent with mild aortic stenosis. No aortic valve  regurgitation seen. Peak left ventricular outflow tract  gradient equals 1 mm Hg, LVOT velocity time integral equals  11 cm.  Aortic Root: 2.8 cm.  Left Atrium: Normal left atrium.  LA volume index = 29  cc/m2.  Left Ventricle: Severe segmental left ventricular systolic  dysfunction. The mid anterior septum, the mid inferolateral  wall, the basal  inferolateral wall, the apical anterior  wall, the apical lateral wall, the basal inferior wall, the  basal anterolateral wall, the mid inferior wall, the mid  anterolateral wall, and the mid inferoseptum are  hypokinetic. The apical inferior wall, the apical septum,  and the apical cap are akinetic.  Normal left ventricular  internal dimensions and wall thicknesses. Mild diastolic  dysfunction (Stage I).  Right Heart: Normal right atrium. Normal right ventricular  size and function. Normal tricuspid valve. Mild tricuspid  regurgitation. Pulmonic valve not well visualized, probably  normal.  Pericardium/Pleura: Normal pericardium with no pericardial  effusion.  Bilateral pleural effusions.

## 2019-10-22 NOTE — CONSULT NOTE ADULT - SUBJECTIVE AND OBJECTIVE BOX
HPI:  Pt is a 64 yo female with DM, HTN, and CVA (requiring cane to ambulate, but no focal deficits)who presented to [facility] with a several hour history of shortness of breath and abdominal pain. Patient states these symptoms started around 4 pmyesterday to this. Prior to this day, patient denies having any similar symptoms prior to yesterday. She had an EKG at OSh that showed ALAN in lead III and ST depressions in V2-V5. The troponing at the OSH was 1.15, nd propBNP of 4000. She was given lasix, ASA/Brillinta loaded, and started on heparin drip. She was transferred to [facility] for HLOC given concern for NSTEMI. (20 Oct 2019 05:44)  Patient has history of diabetes, on oral meds at home, no recent hypoglycemic episodes, no polyuria polydipsia. Patient follows up with PCP for diabetes management.    PAST MEDICAL & SURGICAL HISTORY:  Cerebrovascular accident (CVA)  Hyperlipidemia  Diabetes mellitus  No significant past surgical history      FAMILY HISTORY:  No pertinent family history in first degree relatives      Social History:    Outpatient Medications:    MEDICATIONS  (STANDING):  aspirin enteric coated 81 milliGRAM(s) Oral daily  atorvastatin 80 milliGRAM(s) Oral at bedtime  dextrose 10% Bolus 125 milliLiter(s) IV Bolus once  dextrose 10% Bolus 250 milliLiter(s) IV Bolus once  dextrose 5%. 1000 milliLiter(s) (50 mL/Hr) IV Continuous <Continuous>  dextrose 50% Injectable 12.5 Gram(s) IV Push once  dextrose 50% Injectable 25 Gram(s) IV Push once  dextrose 50% Injectable 25 Gram(s) IV Push once  heparin  Infusion. 950 Unit(s)/Hr (9.5 mL/Hr) IV Continuous <Continuous>  influenza   Vaccine 0.5 milliLiter(s) IntraMuscular once  insulin glargine Injectable (LANTUS) 8 Unit(s) SubCutaneous at bedtime  insulin lispro (HumaLOG) corrective regimen sliding scale   SubCutaneous three times a day before meals  insulin lispro (HumaLOG) corrective regimen sliding scale   SubCutaneous at bedtime  insulin lispro Injectable (HumaLOG) 5 Unit(s) SubCutaneous three times a day before meals  lisinopril 10 milliGRAM(s) Oral daily  metoprolol tartrate 12.5 milliGRAM(s) Oral two times a day    MEDICATIONS  (PRN):  acetaminophen   Tablet .. 650 milliGRAM(s) Oral every 6 hours PRN Moderate Pain (4 - 6)  dextrose 40% Gel 15 Gram(s) Oral once PRN Blood Glucose LESS THAN 70 milliGRAM(s)/deciliter  glucagon  Injectable 1 milliGRAM(s) IntraMuscular once PRN Glucose LESS THAN 70 milligrams/deciliter  heparin  Injectable 3500 Unit(s) IV Push every 6 hours PRN For aPTT less than 40      Allergies    No Known Allergies    Intolerances      Review of Systems:  Constitutional: No fever, no chills  Eyes: No blurry vision  Neuro: No tremors  HEENT: No pain, no neck swelling  Cardiovascular: No chest pain, no palpitations  Respiratory: Has SOB, no cough  GI: No nausea, vomiting, abdominal pain  : No dysuria  Skin: no rash  MSK: Has leg swelling.  Psych: no depression  Endocrine: no polyuria, polydipsia    ALL OTHER SYSTEMS REVIEWED AND NEGATIVE    UNABLE TO OBTAIN    PHYSICAL EXAM:  VITALS: T(C): 37.1 (10-22-19 @ 19:00)  T(F): 98.8 (10-22-19 @ 19:00), Max: 98.8 (10-22-19 @ 19:00)  HR: 84 (10-22-19 @ 21:33) (81 - 108)  BP: 126/66 (10-22-19 @ 21:33) (106/69 - 145/83)  RR:  (17 - 36)  SpO2:  (93% - 100%)  Wt(kg): --  GENERAL: NAD, well-groomed, well-developed  EYES: No proptosis, no lid lag  HEENT:  Atraumatic, Normocephalic  THYROID: Normal size, no palpable nodules  RESPIRATORY: Clear to auscultation bilaterally; No rales, rhonchi, wheezing  CARDIOVASCULAR: Si S2, No murmurs;  GI: Soft, non distended, normal bowel sounds  SKIN: Dry, intact, No rashes or lesions  MUSCULOSKELETAL: Has BL lower extremity edema.  NEURO:  no tremor, sensation decreased in feet BL,    POCT Blood Glucose.: 160 mg/dL (10-22-19 @ 21:25)  POCT Blood Glucose.: 215 mg/dL (10-22-19 @ 17:28)  POCT Blood Glucose.: 154 mg/dL (10-22-19 @ 13:14)  POCT Blood Glucose.: 295 mg/dL (10-22-19 @ 10:47)  POCT Blood Glucose.: 202 mg/dL (10-22-19 @ 08:34)  POCT Blood Glucose.: 265 mg/dL (10-21-19 @ 21:09)  POCT Blood Glucose.: 216 mg/dL (10-21-19 @ 12:42)  POCT Blood Glucose.: 168 mg/dL (10-21-19 @ 08:36)  POCT Blood Glucose.: 219 mg/dL (10-20-19 @ 22:01)  POCT Blood Glucose.: 90 mg/dL (10-20-19 @ 18:47)  POCT Blood Glucose.: 216 mg/dL (10-20-19 @ 13:58)  POCT Blood Glucose.: 190 mg/dL (10-20-19 @ 12:45)  POCT Blood Glucose.: 136 mg/dL (10-20-19 @ 08:59)                            10.2   9.73  )-----------( 426      ( 22 Oct 2019 02:56 )             32.0       10-22    141  |  106  |  17  ----------------------------<  197<H>  4.0   |  22  |  0.73    EGFR if : 101  EGFR if non : 87    Ca    8.4      10-22  Mg     1.9     10-22  Phos  3.6     10-22    TPro  7.7  /  Alb  3.5  /  TBili  0.4  /  DBili  x   /  AST  46<H>  /  ALT  22  /  AlkPhos  85  10-20      Thyroid Function Tests:  10-22 @ 09:18 TSH 0.74 FreeT4 -- T3 -- Anti TPO -- Anti Thyroglobulin Ab -- TSI --      Hemoglobin A1C, Whole Blood: 7.5 % <H> [4.0 - 5.6] (10-20-19 @ 08:45)          Radiology:

## 2019-10-22 NOTE — CONSULT NOTE ADULT - PROBLEM SELECTOR RECOMMENDATION 2
Endocrine consulted - Dr. Niño  Continue Humalog ISS  Continue FS AC/HS and diabetic diet
On medications,  no chest pain, stable, monitored and followed up by cardiothoracic team/cardiology team

## 2019-10-22 NOTE — CONSULT NOTE ADULT - PROBLEM SELECTOR RECOMMENDATION 9
Continue asa 81 daily and atorvastatin 80 HS  Start beta-blocker metoprolol 12.5 bid  Continue pre-op cardiac surgery workup  Check US Carotids/PFT  Hold Brilinta - continue Heparin drip, check P2y12 wed 10/23  Tentative plan for CABG Friday with Dr. Momin Continue asa 81 daily and atorvastatin 80 HS  Start beta-blocker metoprolol 12.5 bid  Continue pre-op cardiac surgery workup  Check US Carotids/PFT/ non-con chest CT  Hold Brilinta - continue Heparin drip, check P2y12 wed 10/23  Tentative plan for CABG Friday with Dr. Momin

## 2019-10-22 NOTE — CONSULT NOTE ADULT - PROBLEM SELECTOR RECOMMENDATION 9
Will increase Lantus to 8 units at bed time.  Will increase Humalog to 5 units before each meal in addition to Humalog correction scale coverage.  Patient counseled for compliance with consistent low carb diet.

## 2019-10-22 NOTE — PROGRESS NOTE ADULT - ATTENDING COMMENTS
Patient presented with NSTEMI.  [facility] today revealed severe three vessel disease.  Plan for CABG per CT surgery.    Continue ASA, high intensity statin, beta-blocker as tolerated.

## 2019-10-23 DIAGNOSIS — Z71.89 OTHER SPECIFIED COUNSELING: ICD-10-CM

## 2019-10-23 LAB
ANION GAP SERPL CALC-SCNC: 18 MMOL/L — HIGH (ref 5–17)
APTT BLD: 67.8 SEC — HIGH (ref 27.5–36.3)
APTT BLD: 68 SEC — HIGH (ref 27.5–36.3)
BLD GP AB SCN SERPL QL: NEGATIVE — SIGNIFICANT CHANGE UP
BUN SERPL-MCNC: 26 MG/DL — HIGH (ref 7–23)
CALCIUM SERPL-MCNC: 9.3 MG/DL — SIGNIFICANT CHANGE UP (ref 8.4–10.5)
CHLORIDE SERPL-SCNC: 101 MMOL/L — SIGNIFICANT CHANGE UP (ref 96–108)
CO2 SERPL-SCNC: 21 MMOL/L — LOW (ref 22–31)
CREAT SERPL-MCNC: 0.82 MG/DL — SIGNIFICANT CHANGE UP (ref 0.5–1.3)
FIBRINOGEN PPP-MCNC: 886 MG/DL — HIGH (ref 350–510)
GLUCOSE BLDC GLUCOMTR-MCNC: 142 MG/DL — HIGH (ref 70–99)
GLUCOSE BLDC GLUCOMTR-MCNC: 192 MG/DL — HIGH (ref 70–99)
GLUCOSE BLDC GLUCOMTR-MCNC: 221 MG/DL — HIGH (ref 70–99)
GLUCOSE BLDC GLUCOMTR-MCNC: 248 MG/DL — HIGH (ref 70–99)
GLUCOSE SERPL-MCNC: 150 MG/DL — HIGH (ref 70–99)
HCT VFR BLD CALC: 35.9 % — SIGNIFICANT CHANGE UP (ref 34.5–45)
HGB BLD-MCNC: 11.3 G/DL — LOW (ref 11.5–15.5)
MAGNESIUM SERPL-MCNC: 2.1 MG/DL — SIGNIFICANT CHANGE UP (ref 1.6–2.6)
MCHC RBC-ENTMCNC: 24.8 PG — LOW (ref 27–34)
MCHC RBC-ENTMCNC: 31.5 GM/DL — LOW (ref 32–36)
MCV RBC AUTO: 78.7 FL — LOW (ref 80–100)
MRSA PCR RESULT.: SIGNIFICANT CHANGE UP
NRBC # BLD: 0 /100 WBCS — SIGNIFICANT CHANGE UP (ref 0–0)
PA ADP PRP-ACNC: 111 PRU — LOW (ref 194–417)
PHOSPHATE SERPL-MCNC: 4 MG/DL — SIGNIFICANT CHANGE UP (ref 2.5–4.5)
PLATELET # BLD AUTO: 435 K/UL — HIGH (ref 150–400)
POTASSIUM SERPL-MCNC: 3.9 MMOL/L — SIGNIFICANT CHANGE UP (ref 3.5–5.3)
POTASSIUM SERPL-SCNC: 3.9 MMOL/L — SIGNIFICANT CHANGE UP (ref 3.5–5.3)
RBC # BLD: 4.56 M/UL — SIGNIFICANT CHANGE UP (ref 3.8–5.2)
RBC # FLD: 13.9 % — SIGNIFICANT CHANGE UP (ref 10.3–14.5)
RH IG SCN BLD-IMP: POSITIVE — SIGNIFICANT CHANGE UP
S AUREUS DNA NOSE QL NAA+PROBE: DETECTED
SODIUM SERPL-SCNC: 140 MMOL/L — SIGNIFICANT CHANGE UP (ref 135–145)
T3 SERPL-MCNC: 127 NG/DL — SIGNIFICANT CHANGE UP (ref 80–200)
T4 AB SER-ACNC: 9.7 UG/DL — SIGNIFICANT CHANGE UP (ref 4.6–12)
TSH SERPL-MCNC: 0.4 UIU/ML — SIGNIFICANT CHANGE UP (ref 0.27–4.2)
WBC # BLD: 9.92 K/UL — SIGNIFICANT CHANGE UP (ref 3.8–10.5)
WBC # FLD AUTO: 9.92 K/UL — SIGNIFICANT CHANGE UP (ref 3.8–10.5)

## 2019-10-23 PROCEDURE — 99233 SBSQ HOSP IP/OBS HIGH 50: CPT

## 2019-10-23 PROCEDURE — 94010 BREATHING CAPACITY TEST: CPT | Mod: 26

## 2019-10-23 PROCEDURE — 93880 EXTRACRANIAL BILAT STUDY: CPT | Mod: 26

## 2019-10-23 RX ORDER — INSULIN LISPRO 100/ML
7 VIAL (ML) SUBCUTANEOUS
Refills: 0 | Status: DISCONTINUED | OUTPATIENT
Start: 2019-10-23 | End: 2019-10-25

## 2019-10-23 RX ORDER — INSULIN GLARGINE 100 [IU]/ML
13 INJECTION, SOLUTION SUBCUTANEOUS AT BEDTIME
Refills: 0 | Status: DISCONTINUED | OUTPATIENT
Start: 2019-10-23 | End: 2019-10-25

## 2019-10-23 RX ORDER — POTASSIUM CHLORIDE 20 MEQ
20 PACKET (EA) ORAL ONCE
Refills: 0 | Status: COMPLETED | OUTPATIENT
Start: 2019-10-23 | End: 2019-10-23

## 2019-10-23 RX ADMIN — HEPARIN SODIUM 950 UNIT(S)/HR: 5000 INJECTION INTRAVENOUS; SUBCUTANEOUS at 03:01

## 2019-10-23 RX ADMIN — ATORVASTATIN CALCIUM 80 MILLIGRAM(S): 80 TABLET, FILM COATED ORAL at 21:42

## 2019-10-23 RX ADMIN — Medication 2: at 08:11

## 2019-10-23 RX ADMIN — LISINOPRIL 10 MILLIGRAM(S): 2.5 TABLET ORAL at 05:14

## 2019-10-23 RX ADMIN — Medication 20 MILLIEQUIVALENT(S): at 05:06

## 2019-10-23 RX ADMIN — Medication 12.5 MILLIGRAM(S): at 05:14

## 2019-10-23 RX ADMIN — Medication 5 UNIT(S): at 08:11

## 2019-10-23 RX ADMIN — Medication 2: at 12:40

## 2019-10-23 RX ADMIN — INSULIN GLARGINE 13 UNIT(S): 100 INJECTION, SOLUTION SUBCUTANEOUS at 21:38

## 2019-10-23 RX ADMIN — Medication 81 MILLIGRAM(S): at 12:51

## 2019-10-23 RX ADMIN — Medication 7 UNIT(S): at 12:41

## 2019-10-23 RX ADMIN — Medication 12.5 MILLIGRAM(S): at 17:04

## 2019-10-23 RX ADMIN — Medication 7 UNIT(S): at 17:03

## 2019-10-23 NOTE — PROGRESS NOTE ADULT - SUBJECTIVE AND OBJECTIVE BOX
Chief complaint  Patient is a 63y old  Female who presents with a chief complaint of NSTEMI (23 Oct 2019 07:37)   Review of systems  Patient in bed, looks comfortable, no fever, no hypoglycemia.    Labs and Fingersticks  CAPILLARY BLOOD GLUCOSE      POCT Blood Glucose.: 248 mg/dL (23 Oct 2019 12:39)  POCT Blood Glucose.: 221 mg/dL (23 Oct 2019 08:07)  POCT Blood Glucose.: 160 mg/dL (22 Oct 2019 21:25)  POCT Blood Glucose.: 215 mg/dL (22 Oct 2019 17:28)      Anion Gap, Serum: 18 <H> (10-23 @ 02:25)  Anion Gap, Serum: 13 (10-22 @ 02:56)  Anion Gap, Serum: 20 <H> (10-21 @ 20:54)      Calcium, Total Serum: 9.3 (10-23 @ 02:25)  Calcium, Total Serum: 8.4 (10-22 @ 02:56)  Calcium, Total Serum: 8.5 (10-21 @ 20:54)          10-23    140  |  101  |  26<H>  ----------------------------<  150<H>  3.9   |  21<L>  |  0.82    Ca    9.3      23 Oct 2019 02:25  Phos  4.0     10-23  Mg     2.1     10-23                          11.3   9.92  )-----------( 435      ( 23 Oct 2019 02:25 )             35.9     Medications  MEDICATIONS  (STANDING):  aspirin enteric coated 81 milliGRAM(s) Oral daily  atorvastatin 80 milliGRAM(s) Oral at bedtime  dextrose 10% Bolus 125 milliLiter(s) IV Bolus once  dextrose 10% Bolus 250 milliLiter(s) IV Bolus once  dextrose 5%. 1000 milliLiter(s) (50 mL/Hr) IV Continuous <Continuous>  dextrose 50% Injectable 12.5 Gram(s) IV Push once  dextrose 50% Injectable 25 Gram(s) IV Push once  dextrose 50% Injectable 25 Gram(s) IV Push once  heparin  Infusion. 950 Unit(s)/Hr (9.5 mL/Hr) IV Continuous <Continuous>  influenza   Vaccine 0.5 milliLiter(s) IntraMuscular once  insulin glargine Injectable (LANTUS) 13 Unit(s) SubCutaneous at bedtime  insulin lispro (HumaLOG) corrective regimen sliding scale   SubCutaneous at bedtime  insulin lispro (HumaLOG) corrective regimen sliding scale   SubCutaneous three times a day before meals  insulin lispro Injectable (HumaLOG) 7 Unit(s) SubCutaneous three times a day before meals  lisinopril 10 milliGRAM(s) Oral daily  metoprolol tartrate 12.5 milliGRAM(s) Oral two times a day      Physical Exam  General: Patient comfortable in bed  Vital Signs Last 12 Hrs  T(F): 98.4 (10-23-19 @ 08:00), Max: 98.6 (10-23-19 @ 06:00)  HR: 92 (10-23-19 @ 14:00) (78 - 100)  BP: 125/59 (10-23-19 @ 14:00) (105/63 - 143/83)  BP(mean): 85 (10-23-19 @ 14:00) (78 - 107)  RR: 26 (10-23-19 @ 14:00) (19 - 35)  SpO2: 93% (10-23-19 @ 05:00) (93% - 93%)  Neck: No palpable thyroid nodules.  CVS: S1S2, No murmurs  Respiratory: No wheezing, no crepitations  GI: Abdomen soft, bowel sounds positive  Musculoskeletal:  edema lower extremities.   Skin: No skin rashes, no ecchymosis    Diagnostics Chief complaint  Patient is a 63y old  Female who presents with a chief complaint of NSTEMI (23 Oct 2019 07:37)   Review of systems  Patient in bed, looks comfortable, no fever,  no hypoglycemia.    Labs and Fingersticks  CAPILLARY BLOOD GLUCOSE      POCT Blood Glucose.: 248 mg/dL (23 Oct 2019 12:39)  POCT Blood Glucose.: 221 mg/dL (23 Oct 2019 08:07)  POCT Blood Glucose.: 160 mg/dL (22 Oct 2019 21:25)  POCT Blood Glucose.: 215 mg/dL (22 Oct 2019 17:28)      Anion Gap, Serum: 18 <H> (10-23 @ 02:25)  Anion Gap, Serum: 13 (10-22 @ 02:56)  Anion Gap, Serum: 20 <H> (10-21 @ 20:54)      Calcium, Total Serum: 9.3 (10-23 @ 02:25)  Calcium, Total Serum: 8.4 (10-22 @ 02:56)  Calcium, Total Serum: 8.5 (10-21 @ 20:54)          10-23    140  |  101  |  26<H>  ----------------------------<  150<H>  3.9   |  21<L>  |  0.82    Ca    9.3      23 Oct 2019 02:25  Phos  4.0     10-23  Mg     2.1     10-23                          11.3   9.92  )-----------( 435      ( 23 Oct 2019 02:25 )             35.9     Medications  MEDICATIONS  (STANDING):  aspirin enteric coated 81 milliGRAM(s) Oral daily  atorvastatin 80 milliGRAM(s) Oral at bedtime  dextrose 10% Bolus 125 milliLiter(s) IV Bolus once  dextrose 10% Bolus 250 milliLiter(s) IV Bolus once  dextrose 5%. 1000 milliLiter(s) (50 mL/Hr) IV Continuous <Continuous>  dextrose 50% Injectable 12.5 Gram(s) IV Push once  dextrose 50% Injectable 25 Gram(s) IV Push once  dextrose 50% Injectable 25 Gram(s) IV Push once  heparin  Infusion. 950 Unit(s)/Hr (9.5 mL/Hr) IV Continuous <Continuous>  influenza   Vaccine 0.5 milliLiter(s) IntraMuscular once  insulin glargine Injectable (LANTUS) 13 Unit(s) SubCutaneous at bedtime  insulin lispro (HumaLOG) corrective regimen sliding scale   SubCutaneous at bedtime  insulin lispro (HumaLOG) corrective regimen sliding scale   SubCutaneous three times a day before meals  insulin lispro Injectable (HumaLOG) 7 Unit(s) SubCutaneous three times a day before meals  lisinopril 10 milliGRAM(s) Oral daily  metoprolol tartrate 12.5 milliGRAM(s) Oral two times a day      Physical Exam  General: Patient comfortable in bed  Vital Signs Last 12 Hrs  T(F): 98.4 (10-23-19 @ 08:00), Max: 98.6 (10-23-19 @ 06:00)  HR: 92 (10-23-19 @ 14:00) (78 - 100)  BP: 125/59 (10-23-19 @ 14:00) (105/63 - 143/83)  BP(mean): 85 (10-23-19 @ 14:00) (78 - 107)  RR: 26 (10-23-19 @ 14:00) (19 - 35)  SpO2: 93% (10-23-19 @ 05:00) (93% - 93%)  Neck: No palpable thyroid nodules.  CVS: S1S2, No murmurs  Respiratory: No wheezing, no crepitations  GI: Abdomen soft, bowel sounds positive  Musculoskeletal:  edema lower extremities.   Skin: No skin rashes, no ecchymosis    Diagnostics

## 2019-10-23 NOTE — PROGRESS NOTE ADULT - ATTENDING COMMENTS
Will increase Lantus to 13u at bedtime, increase Humalog to 7u before each meal, and continue coverage scale.

## 2019-10-23 NOTE — PROGRESS NOTE ADULT - PROBLEM SELECTOR PLAN 2
On medications,  no chest pain, stable, monitored and followed up by primary cardiothoracic team/cardiology team.

## 2019-10-23 NOTE — PROGRESS NOTE ADULT - SUBJECTIVE AND OBJECTIVE BOX
Cardiology Progress Note    Interval: Pt resting comfortably in a chair. Denied chest pain and SOB. Made aware regarding pending CABG on 10/25. No further questions at this time.    Tele: Normal sinus rhythm    HPI:  Pt is a 64 yo female with DM, HTN, and CVA (requiring cane to ambulate, but no focal deficits)who presented to [facility] with a several hour history of shortness of breath and abdominal pain. Patient states these symptoms started around 4 pmyesterday to this. Prior to this day, patient denies having any similar symptoms prior to yesterday. She had an EKG at [facility] that showed ALAN in lead III and ST depressions in V2-V5. The troponing at the OSH was 1.15, nd propBNP of 4000. She was given lasix, ASA/Brillinta loaded, and started on heparin drip. She was transferred to [facility] for HLOC given concern for NSTEMI. (20 Oct 2019 05:44)      Medications:  acetaminophen   Tablet .. 650 milliGRAM(s) Oral every 6 hours PRN  aspirin enteric coated 81 milliGRAM(s) Oral daily  atorvastatin 80 milliGRAM(s) Oral at bedtime  dextrose 10% Bolus 125 milliLiter(s) IV Bolus once  dextrose 10% Bolus 250 milliLiter(s) IV Bolus once  dextrose 40% Gel 15 Gram(s) Oral once PRN  dextrose 5%. 1000 milliLiter(s) IV Continuous <Continuous>  dextrose 50% Injectable 12.5 Gram(s) IV Push once  dextrose 50% Injectable 25 Gram(s) IV Push once  dextrose 50% Injectable 25 Gram(s) IV Push once  glucagon  Injectable 1 milliGRAM(s) IntraMuscular once PRN  heparin  Infusion. 950 Unit(s)/Hr IV Continuous <Continuous>  heparin  Injectable 3500 Unit(s) IV Push every 6 hours PRN  influenza   Vaccine 0.5 milliLiter(s) IntraMuscular once  insulin glargine Injectable (LANTUS) 8 Unit(s) SubCutaneous at bedtime  insulin lispro (HumaLOG) corrective regimen sliding scale   SubCutaneous at bedtime  insulin lispro (HumaLOG) corrective regimen sliding scale   SubCutaneous three times a day before meals  insulin lispro Injectable (HumaLOG) 5 Unit(s) SubCutaneous three times a day before meals  lisinopril 10 milliGRAM(s) Oral daily  metoprolol tartrate 12.5 milliGRAM(s) Oral two times a day      Review of Systems:  Constitutional: [ ] Fever [ ] Chills [ ] Fatigue [ ] Weight change   HEENT: [ ] Blurred vision [ ] Eye Pain [ ] Headache [ ] Runny nose [ ] Sore Throat   Respiratory: [ ] Cough [ ] Wheezing [ ] Shortness of breath  Cardiovascular: [ ] Chest Pain [ ] Palpitations [ ] DILLARD [ ] PND [ ] Orthopnea  Gastrointestinal: [ ] Abdominal Pain [ ] Diarrhea [ ] Constipation [ ] Hemorrhoids [ ] Nausea [ ] Vomiting  Genitourinary: [ ] Nocturia [ ] Dysuria [ ] Incontinence  Extremities: [ ] Swelling [ ] Joint Pain  Neurologic: [ ] Focal deficit [ ] Paresthesias [ ] Syncope  Lymphatic: [ ] Swelling [ ] Lymphadenopathy   Skin: [ ] Rash [ ] Ecchymoses [ ] Wounds [ ] Lesions  Psychiatry: [ ] Depression [ ] Suicidal/Homicidal Ideation [ ] Anxiety [ ] Sleep Disturbances  [ ] 10 point review of systems is otherwise negative except as mentioned above            [ ]Unable to obtain    Vitals:  T(C): 37 (10-23-19 @ 06:00), Max: 37.1 (10-22-19 @ 19:00)  HR: 78 (10-23-19 @ 07:00) (78 - 108)  BP: 112/70 (10-23-19 @ 07:00) (106/69 - 145/83)  BP(mean): 86 (10-23-19 @ 07:00) (76 - 106)  RR: 20 (10-23-19 @ 07:00) (11 - 36)  SpO2: 93% (10-23-19 @ 05:00) (93% - 99%)  Wt(kg): --  Daily     Daily   I&O's Summary    22 Oct 2019 07:01  -  23 Oct 2019 07:00  --------------------------------------------------------  IN: 752.5 mL / OUT: 1350 mL / NET: -597.5 mL        Physical Exam:  General: NAD  Eye: PERRL, EOMI  HENT: Normal oral mucosa NC/AT  CV: Normal S1/S2, RRR, No M/R/G, no edema, no elevation in JVP, R groin site intact with no hematoma  Resp: Normal respiratory effort, clear to auscultation bilaterally, no wheezing, no crackles  Abd: Soft, Non-tender, Non-distended, BS+  Ext: No clubbing, No joint deformity   Neuro: Non-focal, motor and sensory intact  Lymph: No lymphadenopathy  Psych: AAOx3, Mood & affect appropriate  Skin: No rashes, No ecchymoses, No cyanosis    Labs:                        11.3   9.92  )-----------( 435      ( 23 Oct 2019 02:25 )             35.9     10-23    140  |  101  |  26<H>  ----------------------------<  150<H>  3.9   |  21<L>  |  0.82    Ca    9.3      23 Oct 2019 02:25  Phos  4.0     10-23  Mg     2.1     10-23      PT/INR - ( 22 Oct 2019 02:56 )   PT: 13.3 sec;   INR: 1.16 ratio         PTT - ( 23 Oct 2019 02:25 )  PTT:68.0 sec  CARDIAC MARKERS ( 21 Oct 2019 20:54 )  x     / x     / x     / x     / 2.9 ng/mL      Serum Pro-Brain Natriuretic Peptide: 5799 pg/mL (10-20 @ 01:50)          New results/imaging:

## 2019-10-23 NOTE — PROGRESS NOTE ADULT - ASSESSMENT
Assessment  DMT2: 63y Female with DM T2 with hyperglycemia, was on oral meds, now on insulin, blood sugars running high, no hypoglycemic episode, eating meals, compliant with low carb diet, appears comfortable.  CAD: Planing CABG 10/25, on medications, no chest pain, stable, monitored.  HTN: Controlled,  on antihypertensive medications.  HLD: On statin, compliant with  intake.          Mireya Niño MD  Cell: [phone]  Office: [phone] Assessment  DMT2: 63y Female with DM T2 with hyperglycemia, was on oral meds, now on insulin, blood sugars running high, no hypoglycemic episode, eating meals,  compliant with low carb diet, appears comfortable.  CAD: Planing CABG 10/25, on medications, no chest pain, stable, monitored.  HTN: Controlled,  on antihypertensive medications.  HLD: On statin, compliant with  intake.          Mireya Niño MD  Cell: [phone]  Office: [phone]

## 2019-10-23 NOTE — CHART NOTE - NSCHARTNOTEFT_GEN_A_CORE
Pt was transferred out of CCU2 to [address] [place] [address] under medicine service. Verbal sign out was given to Dr. Bailey. Also spoke with CT surgery regarding the transfer. Pt had CT head/chest and carotid duplex. Pending CABG on 10/25 with Dr. Momin.    Cardiology consult service will be contacted for coverage until CABG per medicine service request.    Please call with further questions.    Antwon Partida, #[phone]  Cardiology Fellow

## 2019-10-23 NOTE — PROGRESS NOTE ADULT - ASSESSMENT
A/P: 64 YO F with HTN, HLD, CVA (1/2019) presented to OSH with sudden onset abdominal pain and SOB found to have NSTEMI, transferred to [facility] for further management.    1) Neuro  - no active issues  - hx of CVA in 1/2019 but no residual weakness  - AOx3    2) CV  NSTEMI  - s/p LHC showing TVD pLAD 100%, mCx 90%, dRCA 100%  - pending CABG on 10/25  - brilinta discontinued, last dose on 10/22 5AM; checking p2y12 level  - c/w aspirin 81mg, atorvastatin 80mg, metop 12.5mg bid, lisinopril 10mg   - c/w heparin gtt, monitor PTT  HTN  - normotensive  HLD  - c/w high intensity atorvastatin    3) Pulm  - no active issues on RA    4) GI  - no active issues  - tolerating oral diet    5) Nephro  - no active issues  - monitor cr while on ACE    6) Heme  - H/H stable with no overt signs of bleeding    7) Endo  DM2  - lantus increased to 8 units and humalog 5 units TID per endo    8) PPx  - pt currently on heparin gtt for ACS    Antwon Partida, #[phone]  Cardiology Fellow

## 2019-10-23 NOTE — PROGRESS NOTE ADULT - PROBLEM SELECTOR PLAN 1
Will increase Lantus to 13u at bedtime, increase Humalog to 7u before each meal, and continue coverage scale. Will continue monitoring FS, log, and FU.  Patient counseled for compliance with consistent low carb diet. Will continue monitoring FS, log, and FU.  Patient counseled for compliance with consistent low carb diet.

## 2019-10-23 NOTE — CHART NOTE - NSCHARTNOTEFT_GEN_A_CORE
AICHA BILLY  MRN-[id number]    HPI:  Pt is a 64 yo female with DM, HTN, and CVA (requiring cane to ambulate, but no focal deficits)who presented to [facility] with a several hour history of shortness of breath and abdominal pain. Patient states these symptoms started around 4 pmyesterday to this. Prior to this day, patient denies having any similar symptoms prior to yesterday. She had an EKG at OSh that showed ALAN in lead III and ST depressions in V2-V5. The troponin at the OSH was 1.15, nd propBNP of 4000. She was given lasix, ASA/Brillinta loaded, and started on heparin drip. She was transferred to [facility] for HLOC given concern for NSTEMI. (20 Oct 2019 05:44)      ===========================================  CCU Course: Hospital course: Patient continued on ASA, Brillinta and heparin drip for NSTEMI. Patient was brought to the cath lab holding area and was noted to be dyspneic, abdominal breathing with rales, sat 98% on NC. Evaluated by Dr. Dukes, with postponement of cath. Given lasix 40mg x 1, w/ 400cc void. Transferred to CCU2 for close monitoring. S/p LHC showing pLAD 100%, mCX 90%, dRCA 100% with no intervention. Brilinta was discontinued, last dose given on 10/22 at 5AM. Patient is pending CABG by Dr. Momin on 10/25 (Friday). Endocrine is following for DM2 management. Patient is hemodynamically stable for transfer to medicine floors.      ===========================================  Vital Signs Last 24 Hrs  T(C): 36.9 (23 Oct 2019 08:00), Max: 37 (23 Oct 2019 02:00)  T(F): 98.4 (23 Oct 2019 08:00), Max: 98.6 (23 Oct 2019 02:00)  HR: 86 (23 Oct 2019 16:00) (78 - 103)  BP: 112/76 (23 Oct 2019 16:00) (105/63 - 143/83)  BP(mean): 90 (23 Oct 2019 16:00) (76 - 107)  RR: 22 (23 Oct 2019 16:00) (11 - 36)  SpO2: 96% (23 Oct 2019 15:00) (93% - 98%)    ICU Vital Signs Last 24 Hrs  T(C): 36.9 (23 Oct 2019 08:00), Max: 37 (23 Oct 2019 02:00)  T(F): 98.4 (23 Oct 2019 08:00), Max: 98.6 (23 Oct 2019 02:00)  HR: 86 (23 Oct 2019 16:00) (78 - 103)  BP: 112/76 (23 Oct 2019 16:00) (105/63 - 143/83)  BP(mean): 90 (23 Oct 2019 16:00) (76 - 107)  ABP: --  ABP(mean): --  RR: 22 (23 Oct 2019 16:00) (11 - 36)  SpO2: 96% (23 Oct 2019 15:00) (93% - 98%)    ============================================    ============================================  MEDICATIONS  (STANDING):  aspirin enteric coated 81 milliGRAM(s) Oral daily  atorvastatin 80 milliGRAM(s) Oral at bedtime  dextrose 10% Bolus 125 milliLiter(s) IV Bolus once  dextrose 10% Bolus 250 milliLiter(s) IV Bolus once  dextrose 5%. 1000 milliLiter(s) (50 mL/Hr) IV Continuous <Continuous>  dextrose 50% Injectable 12.5 Gram(s) IV Push once  dextrose 50% Injectable 25 Gram(s) IV Push once  dextrose 50% Injectable 25 Gram(s) IV Push once  heparin  Infusion. 950 Unit(s)/Hr (9.5 mL/Hr) IV Continuous <Continuous>  influenza   Vaccine 0.5 milliLiter(s) IntraMuscular once  insulin glargine Injectable (LANTUS) 13 Unit(s) SubCutaneous at bedtime  insulin lispro (HumaLOG) corrective regimen sliding scale   SubCutaneous at bedtime  insulin lispro (HumaLOG) corrective regimen sliding scale   SubCutaneous three times a day before meals  insulin lispro Injectable (HumaLOG) 7 Unit(s) SubCutaneous three times a day before meals  lisinopril 10 milliGRAM(s) Oral daily  metoprolol tartrate 12.5 milliGRAM(s) Oral two times a day    MEDICATIONS  (PRN):  acetaminophen   Tablet .. 650 milliGRAM(s) Oral every 6 hours PRN Moderate Pain (4 - 6)  dextrose 40% Gel 15 Gram(s) Oral once PRN Blood Glucose LESS THAN 70 milliGRAM(s)/deciliter  glucagon  Injectable 1 milliGRAM(s) IntraMuscular once PRN Glucose LESS THAN 70 milligrams/deciliter  heparin  Injectable 3500 Unit(s) IV Push every 6 hours PRN For aPTT less than 40    ============================================    =============================================  FOLLOW UP:  -Continue heparin gtt for ACS  -F/u with CTS regarding CABG, currently scheduled on Friday  -Continue ASA, Brilinta purposely held  -EF on [facility] 45%        Antonia Moffett, MAR   [id number]

## 2019-10-24 ENCOUNTER — TRANSCRIPTION ENCOUNTER (OUTPATIENT)
Age: 64
End: 2019-10-24

## 2019-10-24 DIAGNOSIS — I25.118 ATHEROSCLEROTIC HEART DISEASE OF NATIVE CORONARY ARTERY WITH OTHER FORMS OF ANGINA PECTORIS: ICD-10-CM

## 2019-10-24 LAB
ALBUMIN SERPL ELPH-MCNC: 3.4 G/DL — SIGNIFICANT CHANGE UP (ref 3.3–5)
ALP SERPL-CCNC: 75 U/L — SIGNIFICANT CHANGE UP (ref 40–120)
ALT FLD-CCNC: 108 U/L — HIGH (ref 10–45)
ANION GAP SERPL CALC-SCNC: 13 MMOL/L — SIGNIFICANT CHANGE UP (ref 5–17)
APPEARANCE UR: CLEAR — SIGNIFICANT CHANGE UP
APTT BLD: 64.7 SEC — HIGH (ref 27.5–36.3)
AST SERPL-CCNC: 66 U/L — HIGH (ref 10–40)
BILIRUB SERPL-MCNC: 0.2 MG/DL — SIGNIFICANT CHANGE UP (ref 0.2–1.2)
BILIRUB UR-MCNC: NEGATIVE — SIGNIFICANT CHANGE UP
BLD GP AB SCN SERPL QL: NEGATIVE — SIGNIFICANT CHANGE UP
BUN SERPL-MCNC: 26 MG/DL — HIGH (ref 7–23)
CALCIUM SERPL-MCNC: 8.6 MG/DL — SIGNIFICANT CHANGE UP (ref 8.4–10.5)
CHLORIDE SERPL-SCNC: 104 MMOL/L — SIGNIFICANT CHANGE UP (ref 96–108)
CO2 SERPL-SCNC: 22 MMOL/L — SIGNIFICANT CHANGE UP (ref 22–31)
COLOR SPEC: SIGNIFICANT CHANGE UP
CREAT SERPL-MCNC: 0.9 MG/DL — SIGNIFICANT CHANGE UP (ref 0.5–1.3)
DIFF PNL FLD: NEGATIVE — SIGNIFICANT CHANGE UP
GLUCOSE BLDC GLUCOMTR-MCNC: 104 MG/DL — HIGH (ref 70–99)
GLUCOSE BLDC GLUCOMTR-MCNC: 133 MG/DL — HIGH (ref 70–99)
GLUCOSE BLDC GLUCOMTR-MCNC: 138 MG/DL — HIGH (ref 70–99)
GLUCOSE BLDC GLUCOMTR-MCNC: 300 MG/DL — HIGH (ref 70–99)
GLUCOSE SERPL-MCNC: 142 MG/DL — HIGH (ref 70–99)
GLUCOSE UR QL: NEGATIVE — SIGNIFICANT CHANGE UP
HCT VFR BLD CALC: 32.4 % — LOW (ref 34.5–45)
HGB BLD-MCNC: 10.2 G/DL — LOW (ref 11.5–15.5)
INR BLD: 1.1 RATIO — SIGNIFICANT CHANGE UP (ref 0.88–1.16)
KETONES UR-MCNC: NEGATIVE — SIGNIFICANT CHANGE UP
LEUKOCYTE ESTERASE UR-ACNC: NEGATIVE — SIGNIFICANT CHANGE UP
MCHC RBC-ENTMCNC: 25 PG — LOW (ref 27–34)
MCHC RBC-ENTMCNC: 31.5 GM/DL — LOW (ref 32–36)
MCV RBC AUTO: 79.4 FL — LOW (ref 80–100)
NITRITE UR-MCNC: NEGATIVE — SIGNIFICANT CHANGE UP
PA ADP PRP-ACNC: 239 PRU — SIGNIFICANT CHANGE UP (ref 194–417)
PH UR: 6.5 — SIGNIFICANT CHANGE UP (ref 5–8)
PLATELET # BLD AUTO: 423 K/UL — HIGH (ref 150–400)
POTASSIUM SERPL-MCNC: 4 MMOL/L — SIGNIFICANT CHANGE UP (ref 3.5–5.3)
POTASSIUM SERPL-SCNC: 4 MMOL/L — SIGNIFICANT CHANGE UP (ref 3.5–5.3)
PROT SERPL-MCNC: 6.6 G/DL — SIGNIFICANT CHANGE UP (ref 6–8.3)
PROT UR-MCNC: NEGATIVE — SIGNIFICANT CHANGE UP
PROTHROM AB SERPL-ACNC: 12.7 SEC — SIGNIFICANT CHANGE UP (ref 10–12.9)
RBC # BLD: 4.08 M/UL — SIGNIFICANT CHANGE UP (ref 3.8–5.2)
RBC # FLD: 14.1 % — SIGNIFICANT CHANGE UP (ref 10.3–14.5)
RH IG SCN BLD-IMP: POSITIVE — SIGNIFICANT CHANGE UP
SODIUM SERPL-SCNC: 139 MMOL/L — SIGNIFICANT CHANGE UP (ref 135–145)
SP GR SPEC: 1.01 — SIGNIFICANT CHANGE UP (ref 1.01–1.02)
UROBILINOGEN FLD QL: NEGATIVE — SIGNIFICANT CHANGE UP
WBC # BLD: 10.06 K/UL — SIGNIFICANT CHANGE UP (ref 3.8–10.5)
WBC # FLD AUTO: 10.06 K/UL — SIGNIFICANT CHANGE UP (ref 3.8–10.5)

## 2019-10-24 PROCEDURE — 93010 ELECTROCARDIOGRAM REPORT: CPT

## 2019-10-24 PROCEDURE — 71045 X-RAY EXAM CHEST 1 VIEW: CPT | Mod: 26

## 2019-10-24 PROCEDURE — 93306 TTE W/DOPPLER COMPLETE: CPT | Mod: 26

## 2019-10-24 PROCEDURE — 99233 SBSQ HOSP IP/OBS HIGH 50: CPT | Mod: GC

## 2019-10-24 RX ORDER — CEFUROXIME AXETIL 250 MG
1500 TABLET ORAL ONCE
Refills: 0 | Status: DISCONTINUED | OUTPATIENT
Start: 2019-10-24 | End: 2019-10-25

## 2019-10-24 RX ORDER — MUPIROCIN 20 MG/G
1 OINTMENT TOPICAL
Refills: 0 | Status: DISCONTINUED | OUTPATIENT
Start: 2019-10-24 | End: 2019-10-24

## 2019-10-24 RX ORDER — CHLORHEXIDINE GLUCONATE 213 G/1000ML
1 SOLUTION TOPICAL ONCE
Refills: 0 | Status: COMPLETED | OUTPATIENT
Start: 2019-10-24 | End: 2019-10-24

## 2019-10-24 RX ORDER — MUPIROCIN 20 MG/G
1 OINTMENT TOPICAL
Refills: 0 | Status: DISCONTINUED | OUTPATIENT
Start: 2019-10-24 | End: 2019-10-25

## 2019-10-24 RX ORDER — CHLORHEXIDINE GLUCONATE 213 G/1000ML
30 SOLUTION TOPICAL ONCE
Refills: 0 | Status: COMPLETED | OUTPATIENT
Start: 2019-10-24 | End: 2019-10-25

## 2019-10-24 RX ADMIN — Medication 7 UNIT(S): at 16:44

## 2019-10-24 RX ADMIN — ATORVASTATIN CALCIUM 80 MILLIGRAM(S): 80 TABLET, FILM COATED ORAL at 22:33

## 2019-10-24 RX ADMIN — Medication 7 UNIT(S): at 09:02

## 2019-10-24 RX ADMIN — LISINOPRIL 10 MILLIGRAM(S): 2.5 TABLET ORAL at 05:07

## 2019-10-24 RX ADMIN — INSULIN GLARGINE 13 UNIT(S): 100 INJECTION, SOLUTION SUBCUTANEOUS at 23:10

## 2019-10-24 RX ADMIN — HEPARIN SODIUM 950 UNIT(S)/HR: 5000 INJECTION INTRAVENOUS; SUBCUTANEOUS at 12:20

## 2019-10-24 RX ADMIN — Medication 7 UNIT(S): at 12:12

## 2019-10-24 RX ADMIN — MUPIROCIN 1 APPLICATION(S): 20 OINTMENT TOPICAL at 22:32

## 2019-10-24 RX ADMIN — Medication 12.5 MILLIGRAM(S): at 05:08

## 2019-10-24 RX ADMIN — Medication 3: at 09:03

## 2019-10-24 RX ADMIN — Medication 12.5 MILLIGRAM(S): at 16:44

## 2019-10-24 RX ADMIN — CHLORHEXIDINE GLUCONATE 1 APPLICATION(S): 213 SOLUTION TOPICAL at 22:16

## 2019-10-24 RX ADMIN — MUPIROCIN 1 APPLICATION(S): 20 OINTMENT TOPICAL at 12:12

## 2019-10-24 RX ADMIN — Medication 81 MILLIGRAM(S): at 12:12

## 2019-10-24 NOTE — PROGRESS NOTE ADULT - ATTENDING COMMENTS
plan for CABG tmrw with Dr. Liliane Cabrera MD  Division of [facility] Medicine  Pager: [phone]  Office: [phone]

## 2019-10-24 NOTE — PROGRESS NOTE ADULT - SUBJECTIVE AND OBJECTIVE BOX
Cardiac Surgery Pre-op Note:    CC: Patient is a 63y old  Female who presents with a chief complaint of NSTEMI underwent cardiac cath 10/22  found to have TVD no schedule for CABG in am      Referring Physician: Dr Dukes                                                                                                           Surgeon Dr Momin    Procedure: 10/25 CABG    Allergies  No Known Allergies  Intolerances    HPI:  Pt is a 64 yo female with DM, HTN, and CVA (requiring cane to ambulate, but no focal deficits)who presented to [facility] with a several hour history of shortness of breath and abdominal pain. Patient states these symptoms started around 4 pmyesterday to this. Prior to this day, patient denies having any similar symptoms prior to yesterday. She had an EKG at [facility] that showed ALAN in lead III and ST depressions in V2-V5. The troponing at the OSH was 1.15, nd propBNP of 4000. She was given lasix, ASA/Brillinta loaded, and started on heparin drip. She was transferred to [facility] for HLOC given concern for NSTEMI. (20 Oct 2019 05:44)      PAST MEDICAL & SURGICAL HISTORY:  Cerebrovascular accident (CVA)  Hyperlipidemia  Diabetes mellitus  No significant past surgical history      MEDICATIONS  (STANDING):  aspirin enteric coated 81 milliGRAM(s) Oral daily  atorvastatin 80 milliGRAM(s) Oral at bedtime  cefuroxime  IVPB 1500 milliGRAM(s) IV Intermittent once  chlorhexidine 0.12% Liquid 30 milliLiter(s) Swish and Spit once  chlorhexidine 4% Liquid 1 Application(s) Topical once  heparin  Infusion. 950 Unit(s)/Hr (9.5 mL/Hr) IV Continuous <Continuous>  influenza   Vaccine 0.5 milliLiter(s) IntraMuscular once  insulin glargine Injectable (LANTUS) 13 Unit(s) SubCutaneous at bedtime  insulin lispro (HumaLOG) corrective regimen sliding scale   SubCutaneous at bedtime  insulin lispro (HumaLOG) corrective regimen sliding scale   SubCutaneous three times a day before meals  insulin lispro Injectable (HumaLOG) 7 Unit(s) SubCutaneous three times a day before meals  metoprolol tartrate 12.5 milliGRAM(s) Oral two times a day  mupirocin 2% Ointment 1 Application(s) Topical two times a day    MEDICATIONS  (PRN):  acetaminophen   Tablet .. 650 milliGRAM(s) Oral every 6 hours PRN Moderate Pain (4 - 6)  dextrose 40% Gel 15 Gram(s) Oral once PRN Blood Glucose LESS THAN 70 milliGRAM(s)/deciliter  glucagon  Injectable 1 milliGRAM(s) IntraMuscular once PRN Glucose LESS THAN 70 milligrams/deciliter  heparin  Injectable 3500 Unit(s) IV Push every 6 hours PRN For aPTT less than 40        Labs:                        10.2   10. )-----------( 423      ( 24 Oct 2019 09:19 )             32.4     10-24    139  |  104  |  26<H>  ----------------------------<  142<H>  4.0   |  22  |  0.90    Ca    8.6      24 Oct 2019 07:11  Phos  4.0     10-23  Mg     2.1     10-23    TPro  6.6  /  Alb  3.4  /  TBili  0.2  /  DBili  x   /  AST  66<H>  /  ALT  108<H>  /  AlkPhos  75  10-24    PT/INR - ( 24 Oct 2019 11:18 )   PT: 12.7 sec;   INR: 1.10 ratio         PTT - ( 24 Oct 2019 11:18 )  PTT:64.7 sec    Blood Type: ABO Interpretation: O (10-24 @ 06:14)    HGB A1C: Hemoglobin A1C, Whole Blood: 7.5 % (10-20 @ 08:45)    Prealbumin:   Pro-BNP: Serum Pro-Brain Natriuretic Peptide: 5799 pg/mL (10-20 @ 01:50)    Thyroid Panel: 10-23 @ 08:36/0.40  --/9.7/127  10-22 @ 09:18/0.74  --/--/--    MRSA: MRSA PCR Result.: NotDetec (10-22 @ 17:50)   / MSSA:   Urinalysis Basic - ( 24 Oct 2019 12:05 )    Color: Light Yellow / Appearance: Clear / S.011 / pH: x  Gluc: x / Ketone: Negative  / Bili: Negative / Urobili: Negative   Blood: x / Protein: Negative / Nitrite: Negative   Leuk Esterase: Negative / RBC: x / WBC x   Sq Epi: x / Non Sq Epi: x / Bacteria: x        CXR: < from: Xray Chest 1 View- PORTABLE-Routine (10.24.19 @ 09:58) >    Bilateral pleural effusions. No pneumothorax.    The heart size is normal.    IMPRESSION:    Bilateral pleural effusions.  EKG:< from: 12 Lead ECG (10.22.19 @ 07:29) >  Diagnosis Line NORMAL SINUS RHYTHM  POSSIBLE LEFT ATRIAL ENLARGEMENT  CANNOT RULE OUT INFERIOR INFARCT , AGE UNDETERMINED  POSSIBLE ANTERIOR INFARCT , AGE UNDETERMINED  ABNORMAL ECG          Carotid Duplex:  < from: VA Duplex Carotid, Bilat (10.23.19 @ 16:06) >  IMPRESSION: No hemodynamically significant internal carotid artery   stenoses.  There are flow-limiting stenoses of both external carotid arteries.    PFT's:    Echocardiogram:< from: TTE with Doppler (w/Cont) (10.21.19 @ 10:52) >  1. Mitral annular calcification and calcified mitral  leaflets with normal diastolic opening. Moderate mitral  regurgitation.  2. Calcified trileaflet aortic valve with decreased  opening. Peak transaortic valve gradient equals 5 mm Hg,  mean transaortic valve gradient equals 3 mm Hg, estimated  aortic valve area equals 1.9 sqcm (by continuity equation),  aortic valve velocity time integral equals 21 cm,  consistent with mild aortic stenosis. No aortic valve  regurgitation seen.  3. Severe segmental left ventricular systolic dysfunction.  The mid anterior septum, the mid inferolateral wall, the  basal  inferolateral wall, the apical anterior wall, the  apical lateral wall, the basal inferior wall, the basal  anterolateral wall, the mid inferior wall, the mid  anterolateral wall, and the mid inferoseptum are  hypokinetic. The apical inferior wall, the apical septum,  and the apical cap are akinetic.  4. Mild diastolic dysfunction (Stage I).  5. Normal right ventricular size and function.  6. Normal pericardium with no pericardial effusion.  7. Bilateralpleural effusions.    Cardiac catheterization:< from: Cardiac Cath Lab - Adult (10.22.19 @ 12:10) >  VENTRICLES: Global left ventricular function was mildly depressed. EF  estimated was 45 %.  CORONARY VESSELS: The coronary circulation is right dominant.  LM:   --  LM: Normal.  LAD:   --  Mid LAD: There was a 100 % stenosis.  CX:   -- Proximal circumflex: There was a 95 % stenosis.  RCA:   --  RPDA: There was a 99 % stenosis.  --  RPL1: There was a 99 % stenosis.  COMPLICATIONS: There were no complications.      Gen: WN/WD NAD  Neuro: AAOx3, nonfocal  Pulm: CTA B/L  CV: RRR, S1S2  Abd: Soft, NT, ND +BS  Ext: No edema, + peripheral pulses      Pt has AICD/PPM [ ] Yes  [x ] No             Brand Name:  Pre-op Beta Blocker ordered within 24 hrs of surgery (CABG ONLY)?  [ x] Yes ? Metoprolol 12.5 BID  Type & Cross  [x ] Yes  [ ] No  NPO after Midnight [ x] Yes  [ ] No  Pre-op ABX ordered, to be taped on chart:  [ x] Yes  [ ] No     Hibiclens/Peridex ordered x[ ] Yes  [ ] No  Intraop on Hold: PRBCs, CXR, CAMILO [x ]   Consent obtained  [ ] Yes  [ ] No Cardiac Surgery Pre-op Note:    CC: Patient is a 63y old  Female who presents with a chief complaint of NSTEMI underwent cardiac cath 10/22  found to have TVD no schedule for CABG in am      Referring Physician: Dr Dukes                                                                                                           Surgeon Dr Momin    Procedure: 10/25 CABG    Allergies  No Known Allergies  Intolerances    HPI:  Pt is a 64 yo female with DM, HTN, and CVA (requiring cane to ambulate, but no focal deficits)who presented to [facility] with a several hour history of shortness of breath and abdominal pain. Patient states these symptoms started around 4 pmyesterday to this. Prior to this day, patient denies having any similar symptoms prior to yesterday. She had an EKG at [facility] that showed ALAN in lead III and ST depressions in V2-V5. The troponing at the OSH was 1.15, nd propBNP of 4000. She was given lasix, ASA/Brillinta loaded, and started on heparin drip. She was transferred to [facility] for HLOC given concern for NSTEMI. (20 Oct 2019 05:44)      PAST MEDICAL & SURGICAL HISTORY:  Cerebrovascular accident (CVA)  Hyperlipidemia  Diabetes mellitus  No significant past surgical history      MEDICATIONS  (STANDING):  aspirin enteric coated 81 milliGRAM(s) Oral daily  atorvastatin 80 milliGRAM(s) Oral at bedtime  cefuroxime  IVPB 1500 milliGRAM(s) IV Intermittent once  chlorhexidine 0.12% Liquid 30 milliLiter(s) Swish and Spit once  chlorhexidine 4% Liquid 1 Application(s) Topical once  heparin  Infusion. 950 Unit(s)/Hr (9.5 mL/Hr) IV Continuous <Continuous>  influenza   Vaccine 0.5 milliLiter(s) IntraMuscular once  insulin glargine Injectable (LANTUS) 13 Unit(s) SubCutaneous at bedtime  insulin lispro (HumaLOG) corrective regimen sliding scale   SubCutaneous at bedtime  insulin lispro (HumaLOG) corrective regimen sliding scale   SubCutaneous three times a day before meals  insulin lispro Injectable (HumaLOG) 7 Unit(s) SubCutaneous three times a day before meals  metoprolol tartrate 12.5 milliGRAM(s) Oral two times a day  mupirocin 2% Ointment 1 Application(s) Topical two times a day    MEDICATIONS  (PRN):  acetaminophen   Tablet .. 650 milliGRAM(s) Oral every 6 hours PRN Moderate Pain (4 - 6)  dextrose 40% Gel 15 Gram(s) Oral once PRN Blood Glucose LESS THAN 70 milliGRAM(s)/deciliter  glucagon  Injectable 1 milliGRAM(s) IntraMuscular once PRN Glucose LESS THAN 70 milligrams/deciliter  heparin  Injectable 3500 Unit(s) IV Push every 6 hours PRN For aPTT less than 40        Labs:                        10.2   10. )-----------( 423      ( 24 Oct 2019 09:19 )             32.4     10-24    139  |  104  |  26<H>  ----------------------------<  142<H>  4.0   |  22  |  0.90    Ca    8.6      24 Oct 2019 07:11  Phos  4.0     10-23  Mg     2.1     10-23    TPro  6.6  /  Alb  3.4  /  TBili  0.2  /  DBili  x   /  AST  66<H>  /  ALT  108<H>  /  AlkPhos  75  10-24    PT/INR - ( 24 Oct 2019 11:18 )   PT: 12.7 sec;   INR: 1.10 ratio         PTT - ( 24 Oct 2019 11:18 )  PTT:64.7 sec    Blood Type: ABO Interpretation: O (10-24 @ 06:14)    HGB A1C: Hemoglobin A1C, Whole Blood: 7.5 % (10-20 @ 08:45)    Prealbumin:   Pro-BNP: Serum Pro-Brain Natriuretic Peptide: 5799 pg/mL (10-20 @ 01:50)    Thyroid Panel: 10-23 @ 08:36/0.40  --/9.7/127  10-22 @ 09:18/0.74  --/--/--    MRSA: MRSA PCR Result.: NotDetec (10-22 @ 17:50)   / MSSA:   Urinalysis Basic - ( 24 Oct 2019 12:05 )    Color: Light Yellow / Appearance: Clear / S.011 / pH: x  Gluc: x / Ketone: Negative  / Bili: Negative / Urobili: Negative   Blood: x / Protein: Negative / Nitrite: Negative   Leuk Esterase: Negative / RBC: x / WBC x   Sq Epi: x / Non Sq Epi: x / Bacteria: x        CXR: < from: Xray Chest 1 View- PORTABLE-Routine (10.24.19 @ 09:58) >    Bilateral pleural effusions. No pneumothorax.    The heart size is normal.    IMPRESSION:    Bilateral pleural effusions.  EKG:< from: 12 Lead ECG (10.22.19 @ 07:29) >  Diagnosis Line NORMAL SINUS RHYTHM  POSSIBLE LEFT ATRIAL ENLARGEMENT  CANNOT RULE OUT INFERIOR INFARCT , AGE UNDETERMINED  POSSIBLE ANTERIOR INFARCT , AGE UNDETERMINED  ABNORMAL ECG          Carotid Duplex:  < from: VA Duplex Carotid, Bilat (10.23.19 @ 16:06) >  IMPRESSION: No hemodynamically significant internal carotid artery   stenoses.  There are flow-limiting stenoses of both external carotid arteries.    PFT's:    Echocardiogram:< from: TTE with Doppler (w/Cont) (10.21.19 @ 10:52) >  1. Mitral annular calcification and calcified mitral  leaflets with normal diastolic opening. Moderate mitral  regurgitation.  2. Calcified trileaflet aortic valve with decreased  opening. Peak transaortic valve gradient equals 5 mm Hg,  mean transaortic valve gradient equals 3 mm Hg, estimated  aortic valve area equals 1.9 sqcm (by continuity equation),  aortic valve velocity time integral equals 21 cm,  consistent with mild aortic stenosis. No aortic valve  regurgitation seen.  3. Severe segmental left ventricular systolic dysfunction.  The mid anterior septum, the mid inferolateral wall, the  basal  inferolateral wall, the apical anterior wall, the  apical lateral wall, the basal inferior wall, the basal  anterolateral wall, the mid inferior wall, the mid  anterolateral wall, and the mid inferoseptum are  hypokinetic. The apical inferior wall, the apical septum,  and the apical cap are akinetic.  4. Mild diastolic dysfunction (Stage I).  5. Normal right ventricular size and function.  6. Normal pericardium with no pericardial effusion.  7. Bilateralpleural effusions.    Cardiac catheterization:< from: Cardiac Cath Lab - Adult (10.22.19 @ 12:10) >  VENTRICLES: Global left ventricular function was mildly depressed. EF  estimated was 45 %.  CORONARY VESSELS: The coronary circulation is right dominant.  LM:   --  LM: Normal.  LAD:   --  Mid LAD: There was a 100 % stenosis.  CX:   -- Proximal circumflex: There was a 95 % stenosis.  RCA:   --  RPDA: There was a 99 % stenosis.  --  RPL1: There was a 99 % stenosis.  COMPLICATIONS: There were no complications.      Gen: WN/WD NAD  Neuro: AAOx3, nonfocal  Pulm: CTA B/L  CV: RRR, S1S2  Abd: Soft, NT, ND +BS  Ext: No edema, + peripheral pulses      Pt has AICD/PPM [ ] Yes  [x ] No             Brand Name:  Pre-op Beta Blocker ordered within 24 hrs of surgery (CABG ONLY)?  [ x] Yes ? Metoprolol 12.5 BID  Type & Cross  [x ] Yes  [ ] No  NPO after Midnight [ x] Yes  [ ] No  Pre-op ABX ordered, to be taped on chart:  [ x] Yes  [ ] No     Hibiclens/Peridex ordered x[ ] Yes  [ ] No  Intraop on Hold: PRBCs, CXR, CAMILO [x ]   Consent obtained  [ x] Yes  [ ] No

## 2019-10-24 NOTE — PROGRESS NOTE ADULT - PROBLEM SELECTOR PLAN 1
Pt with ST segment changes on V2-V5, troponins 1340, BNP 5799.  - s/p LHC showing TVD pLAD 100%, mCx 90%, dRCA 100%  - pending CABG on 10/25  - brilinta discontinued, last dose on 10/22 5AM  - c/w aspirin 81mg, atorvastatin 80mg, metop 12.5mg bid, lisinopril 10mg   - c/w heparin gtt, monitor PTT  - Monitor on telemetry

## 2019-10-24 NOTE — PROGRESS NOTE ADULT - SUBJECTIVE AND OBJECTIVE BOX
***************************************************************  Nakul Wallace, PGY1  Internal Medicine   pager: [phone]  ***************************************************************    AICHA BILLY  63y  MRN: [id number]    Patient is a 63y old  Female who presents with a chief complaint of NSTEMI (23 Oct 2019 15:17)      Subjective: no events ON. Denies fever, CP, SOB, abn pain, N/V, dysuria. Tolerating diet.      MEDICATIONS  (STANDING):  aspirin enteric coated 81 milliGRAM(s) Oral daily  atorvastatin 80 milliGRAM(s) Oral at bedtime  cefuroxime  IVPB 1500 milliGRAM(s) IV Intermittent once  chlorhexidine 0.12% Liquid 30 milliLiter(s) Swish and Spit once  chlorhexidine 4% Liquid 1 Application(s) Topical once  dextrose 10% Bolus 125 milliLiter(s) IV Bolus once  dextrose 10% Bolus 250 milliLiter(s) IV Bolus once  dextrose 5%. 1000 milliLiter(s) (50 mL/Hr) IV Continuous <Continuous>  dextrose 50% Injectable 12.5 Gram(s) IV Push once  dextrose 50% Injectable 25 Gram(s) IV Push once  dextrose 50% Injectable 25 Gram(s) IV Push once  heparin  Infusion. 950 Unit(s)/Hr (9.5 mL/Hr) IV Continuous <Continuous>  influenza   Vaccine 0.5 milliLiter(s) IntraMuscular once  insulin glargine Injectable (LANTUS) 13 Unit(s) SubCutaneous at bedtime  insulin lispro (HumaLOG) corrective regimen sliding scale   SubCutaneous at bedtime  insulin lispro (HumaLOG) corrective regimen sliding scale   SubCutaneous three times a day before meals  insulin lispro Injectable (HumaLOG) 7 Unit(s) SubCutaneous three times a day before meals  metoprolol tartrate 12.5 milliGRAM(s) Oral two times a day  mupirocin 2% Ointment 1 Application(s) Topical two times a day    MEDICATIONS  (PRN):  acetaminophen   Tablet .. 650 milliGRAM(s) Oral every 6 hours PRN Moderate Pain (4 - 6)  dextrose 40% Gel 15 Gram(s) Oral once PRN Blood Glucose LESS THAN 70 milliGRAM(s)/deciliter  glucagon  Injectable 1 milliGRAM(s) IntraMuscular once PRN Glucose LESS THAN 70 milligrams/deciliter  heparin  Injectable 3500 Unit(s) IV Push every 6 hours PRN For aPTT less than 40      Objective:    Vitals: Vital Signs Last 24 Hrs  T(C): 36.7 (10-24-19 @ 11:25), Max: 36.9 (10-23-19 @ 20:04)  T(F): 98 (10-24-19 @ 11:25), Max: 98.4 (10-23-19 @ 20:04)  HR: 82 (10-24-19 @ 11:25) (82 - 97)  BP: 114/76 (10-24-19 @ 11:25) (110/70 - 143/83)  BP(mean): 90 (10-23-19 @ 16:00) (84 - 107)  RR: 18 (10-24-19 @ 11:25) (13 - 27)  SpO2: 96% (10-24-19 @ 11:25) (94% - 96%)            I&O's Summary    23 Oct 2019 07:  -  24 Oct 2019 07:00  --------------------------------------------------------  IN: 980.5 mL / OUT: 600 mL / NET: 380.5 mL    24 Oct 2019 07:  -  24 Oct 2019 12:43  --------------------------------------------------------  IN: 500 mL / OUT: 0 mL / NET: 500 mL        PHYSICAL EXAM:  GENERAL: NAD  HEAD:  Atraumatic, Normocephalic  EYES: EOMI, conjunctiva and sclera clear  CHEST/LUNG: Clear to percussion bilaterally; No rales, rhonchi, wheezing, or rubs  HEART: Regular rate and rhythm; No murmurs, rubs, or gallops  ABDOMEN: Soft, Nontender, Nondistended;   SKIN: No rashes or lesions  NERVOUS SYSTEM:  Alert & Oriented X3, no focal deficit    LABS:  10-24    139  |  104  |  26<H>  ----------------------------<  142<H>  4.0   |  22  |  0.90  10-23    140  |  101  |  26<H>  ----------------------------<  150<H>  3.9   |  21<L>  |  0.82  10-22    141  |  106  |  17  ----------------------------<  197<H>  4.0   |  22  |  0.73    Ca    8.6      24 Oct 2019 07:11  Ca    9.3      23 Oct 2019 02:25  Ca    8.4      22 Oct 2019 02:56  Phos  4.0     10-23  Mg     2.1     10-23    TPro  6.6  /  Alb  3.4  /  TBili  0.2  /  DBili  x   /  AST  66<H>  /  ALT  108<H>  /  AlkPhos  75  10-24      PT/INR - ( 24 Oct 2019 11:18 )   PT: 12.7 sec;   INR: 1.10 ratio         PTT - ( 24 Oct 2019 11:18 )  PTT:64.7 sec              Urinalysis Basic - ( 24 Oct 2019 12:05 )    Color: Light Yellow / Appearance: Clear / S.011 / pH: x  Gluc: x / Ketone: Negative  / Bili: Negative / Urobili: Negative   Blood: x / Protein: Negative / Nitrite: Negative   Leuk Esterase: Negative / RBC: x / WBC x   Sq Epi: x / Non Sq Epi: x / Bacteria: x                              10.2   10.06 )-----------( 423      ( 24 Oct 2019 09:19 )             32.4                         11.3   9.92  )-----------( 435      ( 23 Oct 2019 02:25 )             35.9                         10.2   9.73  )-----------( 426      ( 22 Oct 2019 02:56 )             32.0     CAPILLARY BLOOD GLUCOSE      POCT Blood Glucose.: 104 mg/dL (24 Oct 2019 11:42)  POCT Blood Glucose.: 300 mg/dL (24 Oct 2019 08:42)  POCT Blood Glucose.: 192 mg/dL (23 Oct 2019 21:06)  POCT Blood Glucose.: 142 mg/dL (23 Oct 2019 17:01)      RADIOLOGY & ADDITIONAL TESTS:    Imaging Personally Reviewed:  [x ] YES  [ ] NO    Consultants involved in case:   Consultant(s) Notes Reviewed:  [ x] YES  [ ] NO:   Care Discussed with Consultants/Other Providers [x ] YES  [ ] NO

## 2019-10-24 NOTE — PROGRESS NOTE ADULT - ASSESSMENT
62 YO F with HTN, HLD, CVA (jan 2019) presenting to OSH with sudden onset abdominal pain and SOB found to have NSTEMI transferred to [facility] for further management now s/p LHC showing TVD pLAD 100%, mCx 90%, dRCA 100% pending CABG on 10/25

## 2019-10-24 NOTE — PROGRESS NOTE ADULT - ASSESSMENT
This is a63 y/o female with PMHx of DM2 - A1c 7.5, HTN, and CVA with residual right sided weakness requiring a cane to ambulate who presented to [facility] with c/o abd pain and SOB on 10/19. EKG at OSH showed ALAN in lead III and ST depressions in V2-V5 with elevated troponins. She was given lasix, ASA/Brillinta loaded, and started on heparin drip, then transferred to [facility] for further workup. Pt now s/p 10/22 [facility] demonstrating severe 3VD ECHo reveals Moderate MR mild AS scheduled for CABG in am [id number] This is a63 y/o female with PMHx of DM2 - A1c 7.5, HTN, and CVA with residual right sided weakness requiring a cane to ambulate who presented to [facility] with c/o abd pain and SOB on 10/19. EKG at OSH showed ALAN in lead III and ST depressions in V2-V5 with elevated troponins. She was given lasix, ASA/Brillinta loaded, and started on heparin drip, then transferred to [facility] for further workup. Pt now s/p 10/22 [facility] demonstrating severe 3VD ECHO reveals Moderate MR mild AS scheduled for CABG in am [id number]  repeat ECHO completed IABP prior to OR

## 2019-10-24 NOTE — PROGRESS NOTE ADULT - ASSESSMENT
Assessment  DMT2: 63y Female with DM T2 with hyperglycemia, was on oral meds, now on insulin, blood sugars improved, now trending within acceptable range, no hypoglycemic episode, eating meals, compliant with low carb diet, appears comfortable, planning CABG tomorrow.  CAD: Planing CABG 10/25, on medications, no chest pain, stable, monitored.  HTN: Controlled,  on antihypertensive medications.  HLD: On statin, compliant with  intake.          Mireya Niño MD  Cell: [phone]  Office: [phone] Assessment  DMT2: 63y Female with DM T2 with hyperglycemia, was on oral meds, now on insulin,  blood sugars improved, now trending within acceptable range, no hypoglycemic episode, eating meals, compliant with low carb diet, appears comfortable, planning CABG tomorrow.  CAD: Planing CABG 10/25, on medications, no chest pain, stable, monitored.  HTN: Controlled,  on antihypertensive medications.  HLD: On statin, compliant with  intake.          Mireya Niño MD  Cell: [phone]  Office: [phone]

## 2019-10-24 NOTE — PROGRESS NOTE ADULT - SUBJECTIVE AND OBJECTIVE BOX
Chief complaint  Patient is a 63y old  Female who presents with a chief complaint of NSTEMI (24 Oct 2019 12:42)   Review of systems  Patient in bed, looks comfortable, no fever, no hypoglycemia.    Labs and Fingersticks  CAPILLARY BLOOD GLUCOSE      POCT Blood Glucose.: 104 mg/dL (24 Oct 2019 11:42)  POCT Blood Glucose.: 300 mg/dL (24 Oct 2019 08:42)  POCT Blood Glucose.: 192 mg/dL (23 Oct 2019 21:06)  POCT Blood Glucose.: 142 mg/dL (23 Oct 2019 17:01)      Anion Gap, Serum: 13 (10-24 @ 07:11)  Anion Gap, Serum: 18 <H> (10-23 @ 02:25)      Calcium, Total Serum: 8.6 (10-24 @ 07:11)  Calcium, Total Serum: 9.3 (10-23 @ 02:25)  Albumin, Serum: 3.4 (10-24 @ 07:11)    Alanine Aminotransferase (ALT/SGPT): 108 <H> (10-24 @ 07:11)  Alkaline Phosphatase, Serum: 75 (10-24 @ 07:11)  Aspartate Aminotransferase (AST/SGOT): 66 <H> (10-24 @ 07:11)        10-24    139  |  104  |  26<H>  ----------------------------<  142<H>  4.0   |  22  |  0.90    Ca    8.6      24 Oct 2019 07:11  Phos  4.0     10-23  Mg     2.1     10-23    TPro  6.6  /  Alb  3.4  /  TBili  0.2  /  DBili  x   /  AST  66<H>  /  ALT  108<H>  /  AlkPhos  75  10-24                        10.2   10.06 )-----------( 423      ( 24 Oct 2019 09:19 )             32.4     Medications  MEDICATIONS  (STANDING):  aspirin enteric coated 81 milliGRAM(s) Oral daily  atorvastatin 80 milliGRAM(s) Oral at bedtime  cefuroxime  IVPB 1500 milliGRAM(s) IV Intermittent once  chlorhexidine 0.12% Liquid 30 milliLiter(s) Swish and Spit once  chlorhexidine 4% Liquid 1 Application(s) Topical once  dextrose 10% Bolus 125 milliLiter(s) IV Bolus once  dextrose 10% Bolus 250 milliLiter(s) IV Bolus once  dextrose 5%. 1000 milliLiter(s) (50 mL/Hr) IV Continuous <Continuous>  dextrose 50% Injectable 12.5 Gram(s) IV Push once  dextrose 50% Injectable 25 Gram(s) IV Push once  dextrose 50% Injectable 25 Gram(s) IV Push once  heparin  Infusion. 950 Unit(s)/Hr (9.5 mL/Hr) IV Continuous <Continuous>  influenza   Vaccine 0.5 milliLiter(s) IntraMuscular once  insulin glargine Injectable (LANTUS) 13 Unit(s) SubCutaneous at bedtime  insulin lispro (HumaLOG) corrective regimen sliding scale   SubCutaneous at bedtime  insulin lispro (HumaLOG) corrective regimen sliding scale   SubCutaneous three times a day before meals  insulin lispro Injectable (HumaLOG) 7 Unit(s) SubCutaneous three times a day before meals  metoprolol tartrate 12.5 milliGRAM(s) Oral two times a day  mupirocin 2% Ointment 1 Application(s) Topical two times a day      Physical Exam  General: Patient comfortable in bed  Vital Signs Last 12 Hrs  T(F): 98 (10-24-19 @ 11:25), Max: 98 (10-24-19 @ 04:26)  HR: 82 (10-24-19 @ 11:25) (82 - 86)  BP: 114/76 (10-24-19 @ 11:25) (114/76 - 120/76)  BP(mean): --  RR: 18 (10-24-19 @ 11:25) (18 - 18)  SpO2: 96% (10-24-19 @ 11:25) (95% - 96%)  Neck: No palpable thyroid nodules.  CVS: S1S2, No murmurs  Respiratory: No wheezing, no crepitations  GI: Abdomen soft, bowel sounds positive  Musculoskeletal:  edema lower extremities.   Skin: No skin rashes, no ecchymosis    Diagnostics Chief complaint  Patient is a 63y old  Female who presents with a chief complaint of NSTEMI (24 Oct 2019 12:42)   Review of systems  Patient in bed, looks comfortable,  no fever, no hypoglycemia.    Labs and Fingersticks  CAPILLARY BLOOD GLUCOSE      POCT Blood Glucose.: 104 mg/dL (24 Oct 2019 11:42)  POCT Blood Glucose.: 300 mg/dL (24 Oct 2019 08:42)  POCT Blood Glucose.: 192 mg/dL (23 Oct 2019 21:06)  POCT Blood Glucose.: 142 mg/dL (23 Oct 2019 17:01)      Anion Gap, Serum: 13 (10-24 @ 07:11)  Anion Gap, Serum: 18 <H> (10-23 @ 02:25)      Calcium, Total Serum: 8.6 (10-24 @ 07:11)  Calcium, Total Serum: 9.3 (10-23 @ 02:25)  Albumin, Serum: 3.4 (10-24 @ 07:11)    Alanine Aminotransferase (ALT/SGPT): 108 <H> (10-24 @ 07:11)  Alkaline Phosphatase, Serum: 75 (10-24 @ 07:11)  Aspartate Aminotransferase (AST/SGOT): 66 <H> (10-24 @ 07:11)        10-24    139  |  104  |  26<H>  ----------------------------<  142<H>  4.0   |  22  |  0.90    Ca    8.6      24 Oct 2019 07:11  Phos  4.0     10-23  Mg     2.1     10-23    TPro  6.6  /  Alb  3.4  /  TBili  0.2  /  DBili  x   /  AST  66<H>  /  ALT  108<H>  /  AlkPhos  75  10-24                        10.2   10.06 )-----------( 423      ( 24 Oct 2019 09:19 )             32.4     Medications  MEDICATIONS  (STANDING):  aspirin enteric coated 81 milliGRAM(s) Oral daily  atorvastatin 80 milliGRAM(s) Oral at bedtime  cefuroxime  IVPB 1500 milliGRAM(s) IV Intermittent once  chlorhexidine 0.12% Liquid 30 milliLiter(s) Swish and Spit once  chlorhexidine 4% Liquid 1 Application(s) Topical once  dextrose 10% Bolus 125 milliLiter(s) IV Bolus once  dextrose 10% Bolus 250 milliLiter(s) IV Bolus once  dextrose 5%. 1000 milliLiter(s) (50 mL/Hr) IV Continuous <Continuous>  dextrose 50% Injectable 12.5 Gram(s) IV Push once  dextrose 50% Injectable 25 Gram(s) IV Push once  dextrose 50% Injectable 25 Gram(s) IV Push once  heparin  Infusion. 950 Unit(s)/Hr (9.5 mL/Hr) IV Continuous <Continuous>  influenza   Vaccine 0.5 milliLiter(s) IntraMuscular once  insulin glargine Injectable (LANTUS) 13 Unit(s) SubCutaneous at bedtime  insulin lispro (HumaLOG) corrective regimen sliding scale   SubCutaneous at bedtime  insulin lispro (HumaLOG) corrective regimen sliding scale   SubCutaneous three times a day before meals  insulin lispro Injectable (HumaLOG) 7 Unit(s) SubCutaneous three times a day before meals  metoprolol tartrate 12.5 milliGRAM(s) Oral two times a day  mupirocin 2% Ointment 1 Application(s) Topical two times a day      Physical Exam  General: Patient comfortable in bed  Vital Signs Last 12 Hrs  T(F): 98 (10-24-19 @ 11:25), Max: 98 (10-24-19 @ 04:26)  HR: 82 (10-24-19 @ 11:25) (82 - 86)  BP: 114/76 (10-24-19 @ 11:25) (114/76 - 120/76)  BP(mean): --  RR: 18 (10-24-19 @ 11:25) (18 - 18)  SpO2: 96% (10-24-19 @ 11:25) (95% - 96%)  Neck: No palpable thyroid nodules.  CVS: S1S2, No murmurs  Respiratory: No wheezing, no crepitations  GI: Abdomen soft, bowel sounds positive  Musculoskeletal:  edema lower extremities.   Skin: No skin rashes, no ecchymosis    Diagnostics

## 2019-10-25 ENCOUNTER — RESULT REVIEW (OUTPATIENT)
Age: 64
End: 2019-10-25

## 2019-10-25 ENCOUNTER — APPOINTMENT (OUTPATIENT)
Dept: CARDIOTHORACIC SURGERY | Facility: CLINIC | Age: 64
End: 2019-10-25

## 2019-10-25 ENCOUNTER — APPOINTMENT (OUTPATIENT)
Dept: CARDIOTHORACIC SURGERY | Facility: HOSPITAL | Age: 64
End: 2019-10-25

## 2019-10-25 LAB
ALBUMIN SERPL ELPH-MCNC: 3.4 G/DL — SIGNIFICANT CHANGE UP (ref 3.3–5)
ALP SERPL-CCNC: 81 U/L — SIGNIFICANT CHANGE UP (ref 40–120)
ALT FLD-CCNC: 99 U/L — HIGH (ref 10–45)
ANION GAP SERPL CALC-SCNC: 12 MMOL/L — SIGNIFICANT CHANGE UP (ref 5–17)
APTT BLD: 75.1 SEC — HIGH (ref 27.5–36.3)
AST SERPL-CCNC: 45 U/L — HIGH (ref 10–40)
BASE EXCESS BLDV CALC-SCNC: 2.2 MMOL/L — HIGH (ref -2–2)
BASE EXCESS BLDV CALC-SCNC: 3.4 MMOL/L — HIGH (ref -2–2)
BILIRUB SERPL-MCNC: 0.2 MG/DL — SIGNIFICANT CHANGE UP (ref 0.2–1.2)
BLOOD GAS VENOUS - CREATININE: SIGNIFICANT CHANGE UP MG/DL (ref 0.5–1.3)
BLOOD GAS VENOUS - CREATININE: SIGNIFICANT CHANGE UP MG/DL (ref 0.5–1.3)
BUN SERPL-MCNC: 28 MG/DL — HIGH (ref 7–23)
CA-I SERPL-SCNC: 0.95 MMOL/L — LOW (ref 1.12–1.3)
CA-I SERPL-SCNC: 0.97 MMOL/L — LOW (ref 1.12–1.3)
CALCIUM SERPL-MCNC: 8.8 MG/DL — SIGNIFICANT CHANGE UP (ref 8.4–10.5)
CHLORIDE BLDV-SCNC: SIGNIFICANT CHANGE UP MMOL/L (ref 96–108)
CHLORIDE BLDV-SCNC: SIGNIFICANT CHANGE UP MMOL/L (ref 96–108)
CHLORIDE SERPL-SCNC: 107 MMOL/L — SIGNIFICANT CHANGE UP (ref 96–108)
CO2 SERPL-SCNC: 21 MMOL/L — LOW (ref 22–31)
CREAT SERPL-MCNC: 0.91 MG/DL — SIGNIFICANT CHANGE UP (ref 0.5–1.3)
GAS PNL BLDA: SIGNIFICANT CHANGE UP
GAS PNL BLDV: 142 MMOL/L — SIGNIFICANT CHANGE UP (ref 135–145)
GAS PNL BLDV: 145 MMOL/L — SIGNIFICANT CHANGE UP (ref 135–145)
GAS PNL BLDV: SIGNIFICANT CHANGE UP
GLUCOSE BLDC GLUCOMTR-MCNC: 161 MG/DL — HIGH (ref 70–99)
GLUCOSE BLDC GLUCOMTR-MCNC: 161 MG/DL — HIGH (ref 70–99)
GLUCOSE BLDV-MCNC: 151 MG/DL — HIGH (ref 70–99)
GLUCOSE BLDV-MCNC: 184 MG/DL — HIGH (ref 70–99)
GLUCOSE SERPL-MCNC: 177 MG/DL — HIGH (ref 70–99)
HCO3 BLDV-SCNC: 27 MMOL/L — SIGNIFICANT CHANGE UP (ref 21–29)
HCO3 BLDV-SCNC: 28 MMOL/L — SIGNIFICANT CHANGE UP (ref 21–29)
HCT VFR BLD CALC: 32 % — LOW (ref 34.5–45)
HCT VFR BLDA CALC: 26 % — LOW (ref 39–50)
HCT VFR BLDA CALC: 28 % — LOW (ref 39–50)
HEPARINASE TEG R TIME: 16.2 MIN (ref 4.3–8.3)
HGB BLD CALC-MCNC: 8.2 G/DL — LOW (ref 11.5–15.5)
HGB BLD CALC-MCNC: 8.9 G/DL — LOW (ref 11.5–15.5)
HGB BLD-MCNC: 10.2 G/DL — LOW (ref 11.5–15.5)
HOROWITZ INDEX BLDV+IHG-RTO: 0 — SIGNIFICANT CHANGE UP
HOROWITZ INDEX BLDV+IHG-RTO: 0 — SIGNIFICANT CHANGE UP
LACTATE BLDV-MCNC: 1.1 MMOL/L — SIGNIFICANT CHANGE UP (ref 0.7–2)
LACTATE BLDV-MCNC: 1.3 MMOL/L — SIGNIFICANT CHANGE UP (ref 0.7–2)
MAGNESIUM SERPL-MCNC: 2.2 MG/DL — SIGNIFICANT CHANGE UP (ref 1.6–2.6)
MCHC RBC-ENTMCNC: 25 PG — LOW (ref 27–34)
MCHC RBC-ENTMCNC: 31.9 GM/DL — LOW (ref 32–36)
MCV RBC AUTO: 78.4 FL — LOW (ref 80–100)
NRBC # BLD: 0 /100 WBCS — SIGNIFICANT CHANGE UP (ref 0–0)
PCO2 BLDV: 46 MMHG — SIGNIFICANT CHANGE UP (ref 35–50)
PCO2 BLDV: 47 MMHG — SIGNIFICANT CHANGE UP (ref 35–50)
PH BLDV: 7.38 — SIGNIFICANT CHANGE UP (ref 7.35–7.45)
PH BLDV: 7.41 — SIGNIFICANT CHANGE UP (ref 7.35–7.45)
PHOSPHATE SERPL-MCNC: 3.8 MG/DL — SIGNIFICANT CHANGE UP (ref 2.5–4.5)
PLATELET # BLD AUTO: 438 K/UL — HIGH (ref 150–400)
PLATELET MAPPING ACTF MAX AMPLITUDE: 29.9 MM (ref 2–19)
PLATELET MAPPING ADP MAXIMUM AMPLITUDE: 68.9 MM — SIGNIFICANT CHANGE UP (ref 45–69)
PLATELET MAPPING ADP PERCENT INHIBITION: ABNORMAL % (ref 0–17)
PLATELET MAPPING HKH MAXIMUM AMPLITUDE: >71 MM (ref 53–68)
PO2 BLDV: 92 MMHG — HIGH (ref 25–45)
PO2 BLDV: 92 MMHG — HIGH (ref 25–45)
POTASSIUM BLDV-SCNC: 4.3 MMOL/L — SIGNIFICANT CHANGE UP (ref 3.5–5)
POTASSIUM BLDV-SCNC: 5.8 MMOL/L — HIGH (ref 3.5–5)
POTASSIUM SERPL-MCNC: 4.6 MMOL/L — SIGNIFICANT CHANGE UP (ref 3.5–5.3)
POTASSIUM SERPL-SCNC: 4.6 MMOL/L — SIGNIFICANT CHANGE UP (ref 3.5–5.3)
PROT SERPL-MCNC: 6.8 G/DL — SIGNIFICANT CHANGE UP (ref 6–8.3)
RAPIDTEG MAXIMUM AMPLITUDE: 60.8 MM — SIGNIFICANT CHANGE UP (ref 52–70)
RBC # BLD: 4.08 M/UL — SIGNIFICANT CHANGE UP (ref 3.8–5.2)
RBC # FLD: 14.3 % — SIGNIFICANT CHANGE UP (ref 10.3–14.5)
SAO2 % BLDV: 96 % — HIGH (ref 67–88)
SAO2 % BLDV: 97 % — HIGH (ref 67–88)
SODIUM SERPL-SCNC: 140 MMOL/L — SIGNIFICANT CHANGE UP (ref 135–145)
TEG FUNCTIONAL FIBRINOGEN: 20.9 MM — SIGNIFICANT CHANGE UP (ref 15–32)
TEG MAXIMUM AMPLITUDE: 61.9 MM — SIGNIFICANT CHANGE UP (ref 52–69)
TEG REACTION TIME: 10.7 MIN (ref 4.6–9.1)
WBC # BLD: 11.94 K/UL — HIGH (ref 3.8–10.5)
WBC # FLD AUTO: 11.94 K/UL — HIGH (ref 3.8–10.5)

## 2019-10-25 PROCEDURE — 33430 REPLACEMENT OF MITRAL VALVE: CPT

## 2019-10-25 PROCEDURE — 33535 CABG ARTERIAL THREE: CPT

## 2019-10-25 PROCEDURE — 93010 ELECTROCARDIOGRAM REPORT: CPT

## 2019-10-25 PROCEDURE — 88305 TISSUE EXAM BY PATHOLOGIST: CPT | Mod: 26

## 2019-10-25 PROCEDURE — 35600 OPEN HRV UXTR ART 1 SGM CAB: CPT

## 2019-10-25 PROCEDURE — 35600 OPEN HRV UXTR ART 1 SGM CAB: CPT | Mod: AS

## 2019-10-25 PROCEDURE — 33535 CABG ARTERIAL THREE: CPT | Mod: AS

## 2019-10-25 PROCEDURE — 33967 INSERT I-AORT PERCUT DEVICE: CPT | Mod: 59

## 2019-10-25 PROCEDURE — 33430 REPLACEMENT OF MITRAL VALVE: CPT | Mod: AS

## 2019-10-25 RX ORDER — PANTOPRAZOLE SODIUM 20 MG/1
40 TABLET, DELAYED RELEASE ORAL DAILY
Refills: 0 | Status: DISCONTINUED | OUTPATIENT
Start: 2019-10-26 | End: 2019-10-28

## 2019-10-25 RX ORDER — MEPERIDINE HYDROCHLORIDE 50 MG/ML
25 INJECTION INTRAMUSCULAR; INTRAVENOUS; SUBCUTANEOUS ONCE
Refills: 0 | Status: DISCONTINUED | OUTPATIENT
Start: 2019-10-26 | End: 2019-10-26

## 2019-10-25 RX ORDER — POTASSIUM CHLORIDE 20 MEQ
10 PACKET (EA) ORAL
Refills: 0 | Status: DISCONTINUED | OUTPATIENT
Start: 2019-10-26 | End: 2019-10-26

## 2019-10-25 RX ORDER — CHLORHEXIDINE GLUCONATE 213 G/1000ML
15 SOLUTION TOPICAL EVERY 12 HOURS
Refills: 0 | Status: DISCONTINUED | OUTPATIENT
Start: 2019-10-26 | End: 2019-10-26

## 2019-10-25 RX ORDER — DEXTROSE 50 % IN WATER 50 %
50 SYRINGE (ML) INTRAVENOUS
Refills: 0 | Status: DISCONTINUED | OUTPATIENT
Start: 2019-10-26 | End: 2019-11-08

## 2019-10-25 RX ORDER — ASPIRIN/CALCIUM CARB/MAGNESIUM 324 MG
300 TABLET ORAL ONCE
Refills: 0 | Status: DISCONTINUED | OUTPATIENT
Start: 2019-10-26 | End: 2019-10-26

## 2019-10-25 RX ORDER — SODIUM CHLORIDE 9 MG/ML
1000 INJECTION INTRAMUSCULAR; INTRAVENOUS; SUBCUTANEOUS
Refills: 0 | Status: DISCONTINUED | OUTPATIENT
Start: 2019-10-26 | End: 2019-10-30

## 2019-10-25 RX ORDER — HEPARIN SODIUM 5000 [USP'U]/ML
750 INJECTION INTRAVENOUS; SUBCUTANEOUS
Qty: 25000 | Refills: 0 | Status: DISCONTINUED | OUTPATIENT
Start: 2019-10-25 | End: 2019-10-25

## 2019-10-25 RX ORDER — HEPARIN SODIUM 5000 [USP'U]/ML
3500 INJECTION INTRAVENOUS; SUBCUTANEOUS EVERY 6 HOURS
Refills: 0 | Status: DISCONTINUED | OUTPATIENT
Start: 2019-10-25 | End: 2019-10-25

## 2019-10-25 RX ORDER — DEXTROSE 50 % IN WATER 50 %
25 SYRINGE (ML) INTRAVENOUS
Refills: 0 | Status: DISCONTINUED | OUTPATIENT
Start: 2019-10-26 | End: 2019-11-08

## 2019-10-25 RX ORDER — INSULIN HUMAN 100 [IU]/ML
3 INJECTION, SOLUTION SUBCUTANEOUS
Qty: 100 | Refills: 0 | Status: DISCONTINUED | OUTPATIENT
Start: 2019-10-26 | End: 2019-10-29

## 2019-10-25 RX ADMIN — HEPARIN SODIUM 750 UNIT(S)/HR: 5000 INJECTION INTRAVENOUS; SUBCUTANEOUS at 11:36

## 2019-10-25 RX ADMIN — HEPARIN SODIUM 900 UNIT(S)/HR: 5000 INJECTION INTRAVENOUS; SUBCUTANEOUS at 09:27

## 2019-10-25 RX ADMIN — CHLORHEXIDINE GLUCONATE 30 MILLILITER(S): 213 SOLUTION TOPICAL at 05:54

## 2019-10-25 RX ADMIN — MUPIROCIN 1 APPLICATION(S): 20 OINTMENT TOPICAL at 05:54

## 2019-10-25 RX ADMIN — Medication 1: at 11:37

## 2019-10-25 RX ADMIN — Medication 12.5 MILLIGRAM(S): at 05:51

## 2019-10-25 NOTE — PROGRESS NOTE ADULT - SUBJECTIVE AND OBJECTIVE BOX
Chief complaint  Patient is a 63y old  Female who presents with a chief complaint of NSTEMI (24 Oct 2019 14:33)   Review of systems  Patient in bed, looks comfortable, no fever, no hypoglycemia.    Labs and Fingersticks  CAPILLARY BLOOD GLUCOSE      POCT Blood Glucose.: 161 mg/dL (25 Oct 2019 11:22)  POCT Blood Glucose.: 161 mg/dL (25 Oct 2019 06:44)  POCT Blood Glucose.: 138 mg/dL (24 Oct 2019 22:39)  POCT Blood Glucose.: 133 mg/dL (24 Oct 2019 16:34)      Anion Gap, Serum: 12 (10-25 @ 11:38)  Anion Gap, Serum: 13 (10-24 @ 07:11)      Calcium, Total Serum: 8.8 (10-25 @ 11:38)  Calcium, Total Serum: 8.6 (10-24 @ 07:11)  Albumin, Serum: 3.4 (10-25 @ 11:38)  Albumin, Serum: 3.4 (10-24 @ 07:11)    Alanine Aminotransferase (ALT/SGPT): 99 <H> (10-25 @ 11:38)  Alanine Aminotransferase (ALT/SGPT): 108 <H> (10-24 @ 07:11)  Alkaline Phosphatase, Serum: 81 (10-25 @ 11:38)  Alkaline Phosphatase, Serum: 75 (10-24 @ 07:11)  Aspartate Aminotransferase (AST/SGOT): 45 <H> (10-25 @ 11:38)  Aspartate Aminotransferase (AST/SGOT): 66 <H> (10-24 @ 07:11)        10-25    140  |  107  |  28<H>  ----------------------------<  177<H>  4.6   |  21<L>  |  0.91    Ca    8.8      25 Oct 2019 11:38  Phos  3.8     10-25  Mg     2.2     10-25    TPro  6.8  /  Alb  3.4  /  TBili  0.2  /  DBili  x   /  AST  45<H>  /  ALT  99<H>  /  AlkPhos  81  10-25                        10.2   11.94 )-----------( 438      ( 25 Oct 2019 05:24 )             32.0     Medications  MEDICATIONS  (STANDING):  aspirin enteric coated 81 milliGRAM(s) Oral daily  atorvastatin 80 milliGRAM(s) Oral at bedtime  cefuroxime  IVPB 1500 milliGRAM(s) IV Intermittent once  dextrose 10% Bolus 125 milliLiter(s) IV Bolus once  dextrose 10% Bolus 250 milliLiter(s) IV Bolus once  dextrose 5%. 1000 milliLiter(s) (50 mL/Hr) IV Continuous <Continuous>  dextrose 50% Injectable 12.5 Gram(s) IV Push once  dextrose 50% Injectable 25 Gram(s) IV Push once  dextrose 50% Injectable 25 Gram(s) IV Push once  heparin  Infusion. 750 Unit(s)/Hr (7.5 mL/Hr) IV Continuous <Continuous>  influenza   Vaccine 0.5 milliLiter(s) IntraMuscular once  insulin glargine Injectable (LANTUS) 13 Unit(s) SubCutaneous at bedtime  insulin lispro (HumaLOG) corrective regimen sliding scale   SubCutaneous at bedtime  insulin lispro (HumaLOG) corrective regimen sliding scale   SubCutaneous three times a day before meals  insulin lispro Injectable (HumaLOG) 7 Unit(s) SubCutaneous three times a day before meals  metoprolol tartrate 12.5 milliGRAM(s) Oral two times a day  mupirocin 2% Ointment 1 Application(s) Topical two times a day      Physical Exam  General: Patient comfortable in bed  Vital Signs Last 12 Hrs  T(F): 98 (10-25-19 @ 09:00), Max: 98.5 (10-25-19 @ 04:54)  HR: 78 (10-25-19 @ 12:00) (70 - 90)  BP: 118/74 (10-25-19 @ 04:54) (118/74 - 118/74)  BP(mean): --  RR: 19 (10-25-19 @ 12:00) (9 - 19)  SpO2: 99% (10-25-19 @ 12:00) (96% - 99%)  Neck: No palpable thyroid nodules.  CVS: S1S2, No murmurs  Respiratory: No wheezing, no crepitations  GI: Abdomen soft, bowel sounds positive  Musculoskeletal:  edema lower extremities.   Skin: No skin rashes, no ecchymosis    Diagnostics Chief complaint  Patient is a 63y old  Female who presents with a chief complaint of NSTEMI (24 Oct 2019 14:33)   Review of systems  Patient in bed, looks comfortable, no fever,  no hypoglycemia.    Labs and Fingersticks  CAPILLARY BLOOD GLUCOSE      POCT Blood Glucose.: 161 mg/dL (25 Oct 2019 11:22)  POCT Blood Glucose.: 161 mg/dL (25 Oct 2019 06:44)  POCT Blood Glucose.: 138 mg/dL (24 Oct 2019 22:39)  POCT Blood Glucose.: 133 mg/dL (24 Oct 2019 16:34)      Anion Gap, Serum: 12 (10-25 @ 11:38)  Anion Gap, Serum: 13 (10-24 @ 07:11)      Calcium, Total Serum: 8.8 (10-25 @ 11:38)  Calcium, Total Serum: 8.6 (10-24 @ 07:11)  Albumin, Serum: 3.4 (10-25 @ 11:38)  Albumin, Serum: 3.4 (10-24 @ 07:11)    Alanine Aminotransferase (ALT/SGPT): 99 <H> (10-25 @ 11:38)  Alanine Aminotransferase (ALT/SGPT): 108 <H> (10-24 @ 07:11)  Alkaline Phosphatase, Serum: 81 (10-25 @ 11:38)  Alkaline Phosphatase, Serum: 75 (10-24 @ 07:11)  Aspartate Aminotransferase (AST/SGOT): 45 <H> (10-25 @ 11:38)  Aspartate Aminotransferase (AST/SGOT): 66 <H> (10-24 @ 07:11)        10-25    140  |  107  |  28<H>  ----------------------------<  177<H>  4.6   |  21<L>  |  0.91    Ca    8.8      25 Oct 2019 11:38  Phos  3.8     10-25  Mg     2.2     10-25    TPro  6.8  /  Alb  3.4  /  TBili  0.2  /  DBili  x   /  AST  45<H>  /  ALT  99<H>  /  AlkPhos  81  10-25                        10.2   11.94 )-----------( 438      ( 25 Oct 2019 05:24 )             32.0     Medications  MEDICATIONS  (STANDING):  aspirin enteric coated 81 milliGRAM(s) Oral daily  atorvastatin 80 milliGRAM(s) Oral at bedtime  cefuroxime  IVPB 1500 milliGRAM(s) IV Intermittent once  dextrose 10% Bolus 125 milliLiter(s) IV Bolus once  dextrose 10% Bolus 250 milliLiter(s) IV Bolus once  dextrose 5%. 1000 milliLiter(s) (50 mL/Hr) IV Continuous <Continuous>  dextrose 50% Injectable 12.5 Gram(s) IV Push once  dextrose 50% Injectable 25 Gram(s) IV Push once  dextrose 50% Injectable 25 Gram(s) IV Push once  heparin  Infusion. 750 Unit(s)/Hr (7.5 mL/Hr) IV Continuous <Continuous>  influenza   Vaccine 0.5 milliLiter(s) IntraMuscular once  insulin glargine Injectable (LANTUS) 13 Unit(s) SubCutaneous at bedtime  insulin lispro (HumaLOG) corrective regimen sliding scale   SubCutaneous at bedtime  insulin lispro (HumaLOG) corrective regimen sliding scale   SubCutaneous three times a day before meals  insulin lispro Injectable (HumaLOG) 7 Unit(s) SubCutaneous three times a day before meals  metoprolol tartrate 12.5 milliGRAM(s) Oral two times a day  mupirocin 2% Ointment 1 Application(s) Topical two times a day      Physical Exam  General: Patient comfortable in bed  Vital Signs Last 12 Hrs  T(F): 98 (10-25-19 @ 09:00), Max: 98.5 (10-25-19 @ 04:54)  HR: 78 (10-25-19 @ 12:00) (70 - 90)  BP: 118/74 (10-25-19 @ 04:54) (118/74 - 118/74)  BP(mean): --  RR: 19 (10-25-19 @ 12:00) (9 - 19)  SpO2: 99% (10-25-19 @ 12:00) (96% - 99%)  Neck: No palpable thyroid nodules.  CVS: S1S2, No murmurs  Respiratory: No wheezing, no crepitations  GI: Abdomen soft, bowel sounds positive  Musculoskeletal:  edema lower extremities.   Skin: No skin rashes, no ecchymosis    Diagnostics

## 2019-10-25 NOTE — PROGRESS NOTE ADULT - PROBLEM SELECTOR PLAN 1
Will continue current insulin regimen for now. Will continue monitoring FS, log, and FU.  Patient counseled for compliance with consistent low carb diet. Will continue current insulin regimen for now. Will continue monitoring FS, log, and FU.

## 2019-10-25 NOTE — PRE-OP CHECKLIST - SELECT TESTS ORDERED
Results in MD note/CBC/BMP/CMP/INR/Urinalysis/EKG/POCT Blood Glucose/PT/PTT/Hepatic Function/Type and Screen/CXR PT/PTT/Results in MD note/Type and Screen/CBC/CMP/INR/Hepatic Function/EKG/CXR/161/POCT Blood Glucose/Urinalysis/BMP

## 2019-10-25 NOTE — PROGRESS NOTE ADULT - ASSESSMENT
Assessment  DMT2: 63y Female with DM T2 with hyperglycemia, was on oral meds, now on insulin,  blood sugars improved, now trending within acceptable range, no hypoglycemic episode, scheduled for CABG today.  CAD: Planing CABG 10/25, on medications, no chest pain, stable, monitored.  HTN: Controlled,  on antihypertensive medications.  HLD: On statin, compliant with  intake.          Mireya Niño MD  Cell: [phone]  Office: [phone] Assessment  DMT2: 63y Female with DM T2 with hyperglycemia, was on oral meds, now on insulin,  blood sugars improved, now trending within acceptable range,  no hypoglycemic episode, scheduled for CABG today.  CAD: Planing CABG 10/25, on medications, no chest pain, stable, monitored.  HTN: Controlled,  on antihypertensive medications.  HLD: On statin, compliant with  intake.          Mireya Niño MD  Cell: [phone]  Office: [phone]

## 2019-10-26 PROBLEM — E11.9 TYPE 2 DIABETES MELLITUS WITHOUT COMPLICATIONS: Chronic | Status: ACTIVE | Noted: 2019-10-20

## 2019-10-26 PROBLEM — E78.5 HYPERLIPIDEMIA, UNSPECIFIED: Chronic | Status: ACTIVE | Noted: 2019-10-20

## 2019-10-26 PROBLEM — I63.9 CEREBRAL INFARCTION, UNSPECIFIED: Chronic | Status: ACTIVE | Noted: 2019-10-20

## 2019-10-26 LAB
ALBUMIN SERPL ELPH-MCNC: 2.5 G/DL — LOW (ref 3.3–5)
ALBUMIN SERPL ELPH-MCNC: 3.4 G/DL — SIGNIFICANT CHANGE UP (ref 3.3–5)
ALP SERPL-CCNC: 42 U/L — SIGNIFICANT CHANGE UP (ref 40–120)
ALP SERPL-CCNC: 45 U/L — SIGNIFICANT CHANGE UP (ref 40–120)
ALT FLD-CCNC: 47 U/L — HIGH (ref 10–45)
ALT FLD-CCNC: 56 U/L — HIGH (ref 10–45)
ANION GAP SERPL CALC-SCNC: 16 MMOL/L — SIGNIFICANT CHANGE UP (ref 5–17)
ANION GAP SERPL CALC-SCNC: 19 MMOL/L — HIGH (ref 5–17)
APTT BLD: 36.3 SEC — SIGNIFICANT CHANGE UP (ref 27.5–36.3)
APTT BLD: 36.3 SEC — SIGNIFICANT CHANGE UP (ref 27.5–36.3)
AST SERPL-CCNC: 62 U/L — HIGH (ref 10–40)
AST SERPL-CCNC: 80 U/L — HIGH (ref 10–40)
BASE EXCESS BLDMV CALC-SCNC: -0.6 MMOL/L — SIGNIFICANT CHANGE UP (ref -3–3)
BASE EXCESS BLDMV CALC-SCNC: -1.5 MMOL/L — SIGNIFICANT CHANGE UP (ref -3–3)
BASE EXCESS BLDMV CALC-SCNC: -1.8 MMOL/L — SIGNIFICANT CHANGE UP (ref -3–3)
BASE EXCESS BLDMV CALC-SCNC: -2.8 MMOL/L — SIGNIFICANT CHANGE UP (ref -3–3)
BASE EXCESS BLDMV CALC-SCNC: -2.9 MMOL/L — SIGNIFICANT CHANGE UP (ref -3–3)
BASE EXCESS BLDMV CALC-SCNC: -4.2 MMOL/L — LOW (ref -3–3)
BASE EXCESS BLDMV CALC-SCNC: -4.4 MMOL/L — LOW (ref -3–3)
BASE EXCESS BLDMV CALC-SCNC: -4.5 MMOL/L — LOW (ref -3–3)
BASE EXCESS BLDMV CALC-SCNC: -5.1 MMOL/L — LOW (ref -3–3)
BILIRUB SERPL-MCNC: 1.2 MG/DL — SIGNIFICANT CHANGE UP (ref 0.2–1.2)
BILIRUB SERPL-MCNC: 1.6 MG/DL — HIGH (ref 0.2–1.2)
BUN SERPL-MCNC: 20 MG/DL — SIGNIFICANT CHANGE UP (ref 7–23)
BUN SERPL-MCNC: 26 MG/DL — HIGH (ref 7–23)
CALCIUM SERPL-MCNC: 7.6 MG/DL — LOW (ref 8.4–10.5)
CALCIUM SERPL-MCNC: 7.9 MG/DL — LOW (ref 8.4–10.5)
CHLORIDE SERPL-SCNC: 110 MMOL/L — HIGH (ref 96–108)
CHLORIDE SERPL-SCNC: 112 MMOL/L — HIGH (ref 96–108)
CK MB BLD-MCNC: 5.9 % — HIGH (ref 0–3.5)
CK MB CFR SERPL CALC: 64 NG/ML — HIGH (ref 0–3.8)
CK SERPL-CCNC: 1081 U/L — HIGH (ref 25–170)
CO2 BLDMV-SCNC: 22 MMOL/L — SIGNIFICANT CHANGE UP (ref 21–29)
CO2 BLDMV-SCNC: 23 MMOL/L — SIGNIFICANT CHANGE UP (ref 21–29)
CO2 BLDMV-SCNC: 24 MMOL/L — SIGNIFICANT CHANGE UP (ref 21–29)
CO2 BLDMV-SCNC: 25 MMOL/L — SIGNIFICANT CHANGE UP (ref 21–29)
CO2 SERPL-SCNC: 20 MMOL/L — LOW (ref 22–31)
CO2 SERPL-SCNC: 21 MMOL/L — LOW (ref 22–31)
CREAT SERPL-MCNC: 0.83 MG/DL — SIGNIFICANT CHANGE UP (ref 0.5–1.3)
CREAT SERPL-MCNC: 1.42 MG/DL — HIGH (ref 0.5–1.3)
FIBRINOGEN PPP-MCNC: 380 MG/DL — SIGNIFICANT CHANGE UP (ref 350–510)
GAS PNL BLDA: SIGNIFICANT CHANGE UP
GAS PNL BLDMV: SIGNIFICANT CHANGE UP
GLUCOSE BLDC GLUCOMTR-MCNC: 104 MG/DL — HIGH (ref 70–99)
GLUCOSE BLDC GLUCOMTR-MCNC: 118 MG/DL — HIGH (ref 70–99)
GLUCOSE BLDC GLUCOMTR-MCNC: 131 MG/DL — HIGH (ref 70–99)
GLUCOSE BLDC GLUCOMTR-MCNC: 131 MG/DL — HIGH (ref 70–99)
GLUCOSE BLDC GLUCOMTR-MCNC: 135 MG/DL — HIGH (ref 70–99)
GLUCOSE BLDC GLUCOMTR-MCNC: 142 MG/DL — HIGH (ref 70–99)
GLUCOSE BLDC GLUCOMTR-MCNC: 153 MG/DL — HIGH (ref 70–99)
GLUCOSE BLDC GLUCOMTR-MCNC: 199 MG/DL — HIGH (ref 70–99)
GLUCOSE BLDC GLUCOMTR-MCNC: 221 MG/DL — HIGH (ref 70–99)
GLUCOSE BLDC GLUCOMTR-MCNC: 226 MG/DL — HIGH (ref 70–99)
GLUCOSE BLDC GLUCOMTR-MCNC: 250 MG/DL — HIGH (ref 70–99)
GLUCOSE BLDC GLUCOMTR-MCNC: 96 MG/DL — SIGNIFICANT CHANGE UP (ref 70–99)
GLUCOSE SERPL-MCNC: 124 MG/DL — HIGH (ref 70–99)
GLUCOSE SERPL-MCNC: 232 MG/DL — HIGH (ref 70–99)
HCO3 BLDMV-SCNC: 20 MMOL/L — SIGNIFICANT CHANGE UP (ref 20–28)
HCO3 BLDMV-SCNC: 21 MMOL/L — SIGNIFICANT CHANGE UP (ref 20–28)
HCO3 BLDMV-SCNC: 21 MMOL/L — SIGNIFICANT CHANGE UP (ref 20–28)
HCO3 BLDMV-SCNC: 22 MMOL/L — SIGNIFICANT CHANGE UP (ref 20–28)
HCO3 BLDMV-SCNC: 23 MMOL/L — SIGNIFICANT CHANGE UP (ref 20–28)
HCT VFR BLD CALC: 32.4 % — LOW (ref 34.5–45)
HGB BLD-MCNC: 10.8 G/DL — LOW (ref 11.5–15.5)
HOROWITZ INDEX BLDMV+IHG-RTO: 100 — SIGNIFICANT CHANGE UP
HOROWITZ INDEX BLDMV+IHG-RTO: 40 — SIGNIFICANT CHANGE UP
HOROWITZ INDEX BLDMV+IHG-RTO: 50 — SIGNIFICANT CHANGE UP
HOROWITZ INDEX BLDMV+IHG-RTO: 70 — SIGNIFICANT CHANGE UP
HOROWITZ INDEX BLDMV+IHG-RTO: 70 — SIGNIFICANT CHANGE UP
INR BLD: 1.19 RATIO — HIGH (ref 0.88–1.16)
MCHC RBC-ENTMCNC: 27.1 PG — SIGNIFICANT CHANGE UP (ref 27–34)
MCHC RBC-ENTMCNC: 33.3 GM/DL — SIGNIFICANT CHANGE UP (ref 32–36)
MCV RBC AUTO: 81.2 FL — SIGNIFICANT CHANGE UP (ref 80–100)
NRBC # BLD: 0 /100 WBCS — SIGNIFICANT CHANGE UP (ref 0–0)
O2 CT VFR BLD CALC: 31 MMHG — SIGNIFICANT CHANGE UP (ref 30–65)
O2 CT VFR BLD CALC: 32 MMHG — SIGNIFICANT CHANGE UP (ref 30–65)
O2 CT VFR BLD CALC: 32 MMHG — SIGNIFICANT CHANGE UP (ref 30–65)
O2 CT VFR BLD CALC: 33 MMHG — SIGNIFICANT CHANGE UP (ref 30–65)
O2 CT VFR BLD CALC: 33 MMHG — SIGNIFICANT CHANGE UP (ref 30–65)
O2 CT VFR BLD CALC: 34 MMHG — SIGNIFICANT CHANGE UP (ref 30–65)
O2 CT VFR BLD CALC: 34 MMHG — SIGNIFICANT CHANGE UP (ref 30–65)
O2 CT VFR BLD CALC: 38 MMHG — SIGNIFICANT CHANGE UP (ref 30–65)
O2 CT VFR BLD CALC: 39 MMHG — SIGNIFICANT CHANGE UP (ref 30–65)
PCO2 BLDMV: 35 MMHG — SIGNIFICANT CHANGE UP (ref 30–65)
PCO2 BLDMV: 35 MMHG — SIGNIFICANT CHANGE UP (ref 30–65)
PCO2 BLDMV: 39 MMHG — SIGNIFICANT CHANGE UP (ref 30–65)
PCO2 BLDMV: 42 MMHG — SIGNIFICANT CHANGE UP (ref 30–65)
PCO2 BLDMV: 42 MMHG — SIGNIFICANT CHANGE UP (ref 30–65)
PCO2 BLDMV: 44 MMHG — SIGNIFICANT CHANGE UP (ref 30–65)
PCO2 BLDMV: 46 MMHG — SIGNIFICANT CHANGE UP (ref 30–65)
PCO2 BLDMV: 49 MMHG — SIGNIFICANT CHANGE UP (ref 30–65)
PCO2 BLDMV: 50 MMHG — SIGNIFICANT CHANGE UP (ref 30–65)
PH BLDMV: 7.27 — LOW (ref 7.32–7.45)
PH BLDMV: 7.3 — LOW (ref 7.32–7.45)
PH BLDMV: 7.31 — LOW (ref 7.32–7.45)
PH BLDMV: 7.32 — SIGNIFICANT CHANGE UP (ref 7.32–7.45)
PH BLDMV: 7.38 — SIGNIFICANT CHANGE UP (ref 7.32–7.45)
PH BLDMV: 7.42 — SIGNIFICANT CHANGE UP (ref 7.32–7.45)
PH BLDMV: 7.43 — SIGNIFICANT CHANGE UP (ref 7.32–7.45)
PLATELET # BLD AUTO: 261 K/UL — SIGNIFICANT CHANGE UP (ref 150–400)
POTASSIUM SERPL-MCNC: 3.7 MMOL/L — SIGNIFICANT CHANGE UP (ref 3.5–5.3)
POTASSIUM SERPL-MCNC: 4.6 MMOL/L — SIGNIFICANT CHANGE UP (ref 3.5–5.3)
POTASSIUM SERPL-SCNC: 3.7 MMOL/L — SIGNIFICANT CHANGE UP (ref 3.5–5.3)
POTASSIUM SERPL-SCNC: 4.6 MMOL/L — SIGNIFICANT CHANGE UP (ref 3.5–5.3)
PROT SERPL-MCNC: 4.4 G/DL — LOW (ref 6–8.3)
PROT SERPL-MCNC: 5.3 G/DL — LOW (ref 6–8.3)
PROTHROM AB SERPL-ACNC: 13.6 SEC — HIGH (ref 10–12.9)
RBC # BLD: 3.99 M/UL — SIGNIFICANT CHANGE UP (ref 3.8–5.2)
RBC # FLD: 14.6 % — HIGH (ref 10.3–14.5)
SAO2 % BLDMV: 51 % — LOW (ref 60–90)
SAO2 % BLDMV: 51 % — LOW (ref 60–90)
SAO2 % BLDMV: 52 % — LOW (ref 60–90)
SAO2 % BLDMV: 54 % — LOW (ref 60–90)
SAO2 % BLDMV: 62 % — SIGNIFICANT CHANGE UP (ref 60–90)
SAO2 % BLDMV: 62 % — SIGNIFICANT CHANGE UP (ref 60–90)
SAO2 % BLDMV: 63 % — SIGNIFICANT CHANGE UP (ref 60–90)
SAO2 % BLDMV: 64 % — SIGNIFICANT CHANGE UP (ref 60–90)
SAO2 % BLDMV: 64 % — SIGNIFICANT CHANGE UP (ref 60–90)
SODIUM SERPL-SCNC: 146 MMOL/L — HIGH (ref 135–145)
SODIUM SERPL-SCNC: 152 MMOL/L — HIGH (ref 135–145)
TROPONIN T, HIGH SENSITIVITY RESULT: 2739 NG/L — HIGH (ref 0–51)
WBC # BLD: 22.68 K/UL — HIGH (ref 3.8–10.5)
WBC # FLD AUTO: 22.68 K/UL — HIGH (ref 3.8–10.5)

## 2019-10-26 PROCEDURE — 93010 ELECTROCARDIOGRAM REPORT: CPT

## 2019-10-26 PROCEDURE — 99291 CRITICAL CARE FIRST HOUR: CPT

## 2019-10-26 PROCEDURE — 99292 CRITICAL CARE ADDL 30 MIN: CPT

## 2019-10-26 PROCEDURE — 71045 X-RAY EXAM CHEST 1 VIEW: CPT | Mod: 26

## 2019-10-26 RX ORDER — HYDROMORPHONE HYDROCHLORIDE 2 MG/ML
0.5 INJECTION INTRAMUSCULAR; INTRAVENOUS; SUBCUTANEOUS ONCE
Refills: 0 | Status: DISCONTINUED | OUTPATIENT
Start: 2019-10-26 | End: 2019-10-26

## 2019-10-26 RX ORDER — AMIODARONE HYDROCHLORIDE 400 MG/1
150 TABLET ORAL ONCE
Refills: 0 | Status: COMPLETED | OUTPATIENT
Start: 2019-10-26 | End: 2019-10-26

## 2019-10-26 RX ORDER — ATORVASTATIN CALCIUM 80 MG/1
80 TABLET, FILM COATED ORAL AT BEDTIME
Refills: 0 | Status: DISCONTINUED | OUTPATIENT
Start: 2019-10-26 | End: 2019-11-08

## 2019-10-26 RX ORDER — ALBUMIN HUMAN 25 %
250 VIAL (ML) INTRAVENOUS ONCE
Refills: 0 | Status: COMPLETED | OUTPATIENT
Start: 2019-10-26 | End: 2019-10-26

## 2019-10-26 RX ORDER — CHLORHEXIDINE GLUCONATE 213 G/1000ML
5 SOLUTION TOPICAL EVERY 4 HOURS
Refills: 0 | Status: DISCONTINUED | OUTPATIENT
Start: 2019-10-26 | End: 2019-10-26

## 2019-10-26 RX ORDER — VASOPRESSIN 20 [USP'U]/ML
0.1 INJECTION INTRAVENOUS
Qty: 50 | Refills: 0 | Status: DISCONTINUED | OUTPATIENT
Start: 2019-10-26 | End: 2019-10-26

## 2019-10-26 RX ORDER — DOBUTAMINE HCL 250MG/20ML
2 VIAL (ML) INTRAVENOUS
Qty: 500 | Refills: 0 | Status: DISCONTINUED | OUTPATIENT
Start: 2019-10-26 | End: 2019-10-27

## 2019-10-26 RX ORDER — CHLORHEXIDINE GLUCONATE 213 G/1000ML
1 SOLUTION TOPICAL
Refills: 0 | Status: DISCONTINUED | OUTPATIENT
Start: 2019-10-26 | End: 2019-11-08

## 2019-10-26 RX ORDER — MILRINONE LACTATE 1 MG/ML
0.2 INJECTION, SOLUTION INTRAVENOUS
Qty: 20 | Refills: 0 | Status: DISCONTINUED | OUTPATIENT
Start: 2019-10-26 | End: 2019-10-29

## 2019-10-26 RX ORDER — ASPIRIN/CALCIUM CARB/MAGNESIUM 324 MG
81 TABLET ORAL DAILY
Refills: 0 | Status: DISCONTINUED | OUTPATIENT
Start: 2019-10-26 | End: 2019-11-08

## 2019-10-26 RX ORDER — SENNA PLUS 8.6 MG/1
2 TABLET ORAL AT BEDTIME
Refills: 0 | Status: DISCONTINUED | OUTPATIENT
Start: 2019-10-26 | End: 2019-10-28

## 2019-10-26 RX ORDER — NOREPINEPHRINE BITARTRATE/D5W 8 MG/250ML
0.05 PLASTIC BAG, INJECTION (ML) INTRAVENOUS
Qty: 8 | Refills: 0 | Status: DISCONTINUED | OUTPATIENT
Start: 2019-10-26 | End: 2019-10-26

## 2019-10-26 RX ORDER — FUROSEMIDE 40 MG
40 TABLET ORAL ONCE
Refills: 0 | Status: COMPLETED | OUTPATIENT
Start: 2019-10-26 | End: 2019-10-26

## 2019-10-26 RX ORDER — EPINEPHRINE 0.3 MG/.3ML
0.02 INJECTION INTRAMUSCULAR; SUBCUTANEOUS
Qty: 4 | Refills: 0 | Status: DISCONTINUED | OUTPATIENT
Start: 2019-10-26 | End: 2019-10-26

## 2019-10-26 RX ORDER — DEXMEDETOMIDINE HYDROCHLORIDE IN 0.9% SODIUM CHLORIDE 4 UG/ML
0.3 INJECTION INTRAVENOUS
Qty: 200 | Refills: 0 | Status: DISCONTINUED | OUTPATIENT
Start: 2019-10-26 | End: 2019-10-26

## 2019-10-26 RX ORDER — AMIODARONE HYDROCHLORIDE 400 MG/1
0.5 TABLET ORAL
Qty: 900 | Refills: 0 | Status: DISCONTINUED | OUTPATIENT
Start: 2019-10-26 | End: 2019-10-26

## 2019-10-26 RX ORDER — NICARDIPINE HYDROCHLORIDE 30 MG/1
5 CAPSULE, EXTENDED RELEASE ORAL
Qty: 40 | Refills: 0 | Status: DISCONTINUED | OUTPATIENT
Start: 2019-10-26 | End: 2019-10-26

## 2019-10-26 RX ORDER — CALCIUM GLUCONATE 100 MG/ML
1 VIAL (ML) INTRAVENOUS ONCE
Refills: 0 | Status: COMPLETED | OUTPATIENT
Start: 2019-10-26 | End: 2019-10-26

## 2019-10-26 RX ORDER — POTASSIUM CHLORIDE 20 MEQ
10 PACKET (EA) ORAL
Refills: 0 | Status: COMPLETED | OUTPATIENT
Start: 2019-10-26 | End: 2019-10-26

## 2019-10-26 RX ORDER — MAGNESIUM SULFATE 500 MG/ML
2 VIAL (ML) INJECTION ONCE
Refills: 0 | Status: COMPLETED | OUTPATIENT
Start: 2019-10-26 | End: 2019-10-26

## 2019-10-26 RX ORDER — ACETAMINOPHEN 500 MG
1000 TABLET ORAL ONCE
Refills: 0 | Status: COMPLETED | OUTPATIENT
Start: 2019-10-26 | End: 2019-10-26

## 2019-10-26 RX ORDER — POLYETHYLENE GLYCOL 3350 17 G/17G
17 POWDER, FOR SOLUTION ORAL DAILY
Refills: 0 | Status: DISCONTINUED | OUTPATIENT
Start: 2019-10-26 | End: 2019-10-28

## 2019-10-26 RX ORDER — HEPARIN SODIUM 5000 [USP'U]/ML
500 INJECTION INTRAVENOUS; SUBCUTANEOUS
Qty: 25000 | Refills: 0 | Status: DISCONTINUED | OUTPATIENT
Start: 2019-10-26 | End: 2019-10-28

## 2019-10-26 RX ORDER — CEFUROXIME AXETIL 250 MG
1500 TABLET ORAL EVERY 8 HOURS
Refills: 0 | Status: COMPLETED | OUTPATIENT
Start: 2019-10-26 | End: 2019-10-27

## 2019-10-26 RX ADMIN — AMIODARONE HYDROCHLORIDE 600 MILLIGRAM(S): 400 TABLET ORAL at 03:44

## 2019-10-26 RX ADMIN — INSULIN HUMAN 3 UNIT(S)/HR: 100 INJECTION, SOLUTION SUBCUTANEOUS at 21:26

## 2019-10-26 RX ADMIN — Medication 100 MILLIGRAM(S): at 10:03

## 2019-10-26 RX ADMIN — Medication 500 MILLILITER(S): at 03:17

## 2019-10-26 RX ADMIN — Medication 100 MILLIEQUIVALENT(S): at 00:55

## 2019-10-26 RX ADMIN — Medication 200 GRAM(S): at 05:45

## 2019-10-26 RX ADMIN — Medication 125 MILLILITER(S): at 11:19

## 2019-10-26 RX ADMIN — Medication 125 MILLILITER(S): at 06:37

## 2019-10-26 RX ADMIN — PANTOPRAZOLE SODIUM 40 MILLIGRAM(S): 20 TABLET, DELAYED RELEASE ORAL at 12:22

## 2019-10-26 RX ADMIN — HYDROMORPHONE HYDROCHLORIDE 0.5 MILLIGRAM(S): 2 INJECTION INTRAMUSCULAR; INTRAVENOUS; SUBCUTANEOUS at 21:45

## 2019-10-26 RX ADMIN — Medication 100 MILLIGRAM(S): at 17:09

## 2019-10-26 RX ADMIN — MILRINONE LACTATE 3.47 MICROGRAM(S)/KG/MIN: 1 INJECTION, SOLUTION INTRAVENOUS at 21:28

## 2019-10-26 RX ADMIN — ATORVASTATIN CALCIUM 80 MILLIGRAM(S): 80 TABLET, FILM COATED ORAL at 21:25

## 2019-10-26 RX ADMIN — Medication 5 MICROGRAM(S)/KG/MIN: at 03:45

## 2019-10-26 RX ADMIN — INSULIN HUMAN 3 UNIT(S)/HR: 100 INJECTION, SOLUTION SUBCUTANEOUS at 02:01

## 2019-10-26 RX ADMIN — HEPARIN SODIUM 5 UNIT(S)/HR: 5000 INJECTION INTRAVENOUS; SUBCUTANEOUS at 16:23

## 2019-10-26 RX ADMIN — DEXMEDETOMIDINE HYDROCHLORIDE IN 0.9% SODIUM CHLORIDE 4.34 MICROGRAM(S)/KG/HR: 4 INJECTION INTRAVENOUS at 16:22

## 2019-10-26 RX ADMIN — Medication 100 MILLIGRAM(S): at 02:04

## 2019-10-26 RX ADMIN — Medication 100 MILLIEQUIVALENT(S): at 01:35

## 2019-10-26 RX ADMIN — Medication 40 MILLIGRAM(S): at 21:42

## 2019-10-26 RX ADMIN — Medication 50 GRAM(S): at 03:16

## 2019-10-26 RX ADMIN — MILRINONE LACTATE 8.69 MICROGRAM(S)/KG/MIN: 1 INJECTION, SOLUTION INTRAVENOUS at 02:01

## 2019-10-26 RX ADMIN — Medication 400 MILLIGRAM(S): at 19:19

## 2019-10-26 RX ADMIN — Medication 81 MILLIGRAM(S): at 12:22

## 2019-10-26 RX ADMIN — HEPARIN SODIUM 7 UNIT(S)/HR: 5000 INJECTION INTRAVENOUS; SUBCUTANEOUS at 21:27

## 2019-10-26 RX ADMIN — AMIODARONE HYDROCHLORIDE 600 MILLIGRAM(S): 400 TABLET ORAL at 05:09

## 2019-10-26 RX ADMIN — NICARDIPINE HYDROCHLORIDE 25 MG/HR: 30 CAPSULE, EXTENDED RELEASE ORAL at 02:02

## 2019-10-26 RX ADMIN — Medication 125 MILLILITER(S): at 14:53

## 2019-10-26 RX ADMIN — SENNA PLUS 2 TABLET(S): 8.6 TABLET ORAL at 21:25

## 2019-10-26 RX ADMIN — HYDROMORPHONE HYDROCHLORIDE 0.5 MILLIGRAM(S): 2 INJECTION INTRAMUSCULAR; INTRAVENOUS; SUBCUTANEOUS at 21:00

## 2019-10-26 RX ADMIN — EPINEPHRINE 3.3 MICROGRAM(S)/KG/MIN: 0.3 INJECTION INTRAMUSCULAR; SUBCUTANEOUS at 02:01

## 2019-10-26 RX ADMIN — Medication 1000 MILLIGRAM(S): at 20:00

## 2019-10-26 RX ADMIN — Medication 8.69 MICROGRAM(S)/KG/MIN: at 02:04

## 2019-10-26 RX ADMIN — Medication 500 MILLILITER(S): at 21:00

## 2019-10-26 RX ADMIN — Medication 8.69 MICROGRAM(S)/KG/MIN: at 21:27

## 2019-10-26 RX ADMIN — CHLORHEXIDINE GLUCONATE 15 MILLILITER(S): 213 SOLUTION TOPICAL at 05:44

## 2019-10-26 RX ADMIN — AMIODARONE HYDROCHLORIDE 33.33 MG/MIN: 400 TABLET ORAL at 05:10

## 2019-10-26 RX ADMIN — VASOPRESSIN 6 UNIT(S)/MIN: 20 INJECTION INTRAVENOUS at 05:45

## 2019-10-26 RX ADMIN — Medication 100 MILLIEQUIVALENT(S): at 11:20

## 2019-10-26 RX ADMIN — Medication 100 MILLIEQUIVALENT(S): at 12:22

## 2019-10-26 RX ADMIN — Medication 125 MILLILITER(S): at 02:23

## 2019-10-26 NOTE — BRIEF OPERATIVE NOTE - NSICDXBRIEFPREOP_GEN_ALL_CORE_FT
PRE-OP DIAGNOSIS:  Mitral valve regurgitation 26-Oct-2019 00:39:56  Kuldeep Gregorio  CAD, multiple vessel 26-Oct-2019 00:39:45  Kuldeep Gregorio

## 2019-10-26 NOTE — BRIEF OPERATIVE NOTE - NSICDXBRIEFPROCEDURE_GEN_ALL_CORE_FT
PROCEDURES:  Clipping, left atrial appendage 26-Oct-2019 00:36:59  Kuldeep Gregorio  MVR (mitral valve replacement) 26-Oct-2019 00:34:21 31mm tissue valve Kuldeep Gregorio  CABG x 4 26-Oct-2019 00:34:15 LIMA to OM, DELIA to LAD, SVG to PRICE, Radial artery Y graft to PDA off of SVG   pre-op IABP Kuldeep Gregorio

## 2019-10-26 NOTE — PROVIDER CONTACT NOTE (OTHER) - ACTION/TREATMENT ORDERED:
Instructed by PA to keep Heparin gtt at current rate of  9.5mL/hr.
Perform regular pulse checks, place warming blanket. Continue to monitor.

## 2019-10-26 NOTE — PROGRESS NOTE ADULT - ASSESSMENT
Assessment  DMT2: 63y Female with DM T2 with hyperglycemia, was on oral meds, now on insulin, FS improving,  no hypoglycemic episode,   CAD: S/P CABG 10/25, on medications, no chest pain, stable, monitored.  HTN: Controlled,  on antihypertensive medications.  HLD: On statin, compliant with  intake.          Mireya Niño MD  Cell: [phone]  Office: [phone]

## 2019-10-26 NOTE — AIRWAY REMOVAL NOTE  ADULT & PEDS - ARTIFICAL AIRWAY REMOVAL COMMENTS
Written order for extubation verified. The patient was identified by full name and birth date compared to the identification band. Present during the procedure was Stephanie ojeda RN

## 2019-10-26 NOTE — PROGRESS NOTE ADULT - SUBJECTIVE AND OBJECTIVE BOX
AICHA BILLY  MRN#:  [id number]    The patient is a 63y Female with DM, HTN, and CVA (requiring cane to ambulate), admitted with NSTEMI, she was found to have multivessel disease and is now recovering s/p C4 BIMA/radial artery grafts 10/25, returning from the OR after midnight, who was seen, evaluated, & examined with the CTICU staff on admission and during the early morning hours with a multidisciplinary care plan formulated & implemented.  All available clinical, laboratory, radiographic, pharmacologic, and electrocardiographic data were reviewed & analyzed.      The patient was in the CTICU in critical condition at risk for imminent decompensation secondary to persistent cardiopulmonary dysfunction, cardiogenic shock, hyperlactatemia-acidosis, and stress hyperglycemia.      Respiratory status required full ventilatory support, close monitoring of respiratory rate and breathing pattern, the following of ABG’s with A-line monitoring, continuous pulse oximetry monitoring, an IV Dexmedetomidine infusion, an IV Propofol infusion for support & to evaluate for & prevent further decompensation secondary to persistent cardiopulmonary dysfunction and cardiogenic shock-cardiovascular dysfunction. Based upon my evaluation and review, I maintained the current therapy/I made appropriate ventilator changes/I extubated the patient.     Invasive hemodynamic monitoring with a PA catheter & an A-line were required for the following of serial CI's/MV02's and continuous central venous, pulmonary artery pressure and MAP/BP monitoring to ensure adequate cardiovascular support and to evaluate for & help prevent decompensation while receiving intermittent volume expansion, intra aortic balloon counterpulsation at 1:1, an IV Epinephrine drip, and an IV Primacor drip secondary to anticipated cardiogenic shock, hyperlactatemia-acidosis, anticipated acute postoperative blood loss anemia, and acute on chronic postoperative kidney injury-failure. Based upon my evaluation and review, I maintained the current vasopressor/inotropic/antiarrhythmic/fluid therapy/I modified the vasopressor/inotropic/ antiarrythymic/fluid therapy/ I added the following vasopressor/inotropic/antiarrythmic/fluid therapy.    Metabolic stability, uncontrolled type 2 diabetes-hyperglycemia, & infection prophylaxis required an IV regular Insulin drip & the following of serial glucose levels to help achieve & maintain euglycemia. Based upon my evaluation and review, I maintained the current metabolic therapy.     Patient required acute postoperative critical care management and it at risk for life threatening decompensation. I provided 35 minutes of non-continuous care to the patient. AICHA BILLY  MRN#:  [id number]    The patient is a 63y Female with DM, HTN, and CVA (requiring cane to ambulate), admitted with NSTEMI, she was found to have multivessel disease and is now recovering s/p C4 BIMA/radial artery grafts 10/25, returning from the OR after midnight, who was seen, evaluated, & examined with the CTICU staff on admission and during the early morning hours with a multidisciplinary care plan formulated & implemented.  All available clinical, laboratory, radiographic, pharmacologic, and electrocardiographic data were reviewed & analyzed.      The patient was in the CTICU in critical condition at risk for imminent decompensation secondary to persistent cardiopulmonary dysfunction, cardiogenic shock, hyperlactatemia-acidosis, and stress hyperglycemia.      Respiratory status required full ventilatory support, close monitoring of respiratory rate and breathing pattern, the following of ABG’s with A-line monitoring, and continuous pulse oximetry monitoring for support & to evaluate for & prevent further decompensation secondary to persistent cardiopulmonary dysfunction and cardiogenic shock-cardiovascular dysfunction. Based upon my evaluation and review, I maintained the current ventilator support.    Invasive hemodynamic monitoring with a PA catheter & an A-line were required for the following of serial CI's/MV02's and continuous central venous, pulmonary artery pressure and MAP/BP monitoring to ensure adequate cardiovascular support and to evaluate for & help prevent decompensation while receiving intermittent volume expansion, intra aortic balloon counterpulsation at 1:1, an IV Epinephrine drip, and an IV Primacor drip, and an IV Vasopressin drip, secondary to cardiogenic shock, and hyperlactatemia-acidosis. Based upon my evaluation and review, I transitioned from Epinephrine and started an IV Dobutamine drip and ordered additional volume resuscitation.    Metabolic stability, uncontrolled type 2 diabetes-hyperglycemia, & infection prophylaxis required an IV regular Insulin drip & the following of serial glucose levels to help achieve & maintain euglycemia. Based upon my evaluation and review, I maintained the current metabolic therapy.     Patient required acute postoperative critical care management and it at risk for life threatening decompensation. I provided 40 minutes of non-continuous care to the patient.

## 2019-10-26 NOTE — BRIEF OPERATIVE NOTE - NSICDXBRIEFPOSTOP_GEN_ALL_CORE_FT
POST-OP DIAGNOSIS:  Mitral valve regurgitation 26-Oct-2019 00:40:21  Kuldeep Gregorio  CAD, multiple vessel 26-Oct-2019 00:40:12  Kuldeep Gregorio

## 2019-10-26 NOTE — PROVIDER CONTACT NOTE (OTHER) - ASSESSMENT
Pt out of OR, upon initial assessment unable to find dopplar pulse in right foot. Only able to locate right popliteal. All other extremity pulses were able to be found on dopplar. VS stable. Will continue to monitor.

## 2019-10-26 NOTE — BRIEF OPERATIVE NOTE - ADULT CT SURG CONDUIT HARVEST PERFORMED BY
Right greater saphenous vein harvested open-Caurso PAC; Left Radial Artery harvested open-Maria Guadalupe PAC

## 2019-10-26 NOTE — PROGRESS NOTE ADULT - SUBJECTIVE AND OBJECTIVE BOX
Chief complaint  Patient is a 63y old  Female who presents with a chief complaint of NSTEMI (26 Oct 2019 07:19)   Review of systems  Patient in bed, looks comfortable, no fever, no hypoglycemia.    Labs and Fingersticks  CAPILLARY BLOOD GLUCOSE  104 (26 Oct 2019 22:00)  109 (26 Oct 2019 21:00)  118 (26 Oct 2019 20:00)  129 (26 Oct 2019 19:00)  130 (26 Oct 2019 18:00)  131 (26 Oct 2019 17:00)  135 (26 Oct 2019 16:00)  133 (26 Oct 2019 15:00)  131 (26 Oct 2019 14:00)  142 (26 Oct 2019 13:00)  153 (26 Oct 2019 12:00)  179 (26 Oct 2019 11:00)  199 (26 Oct 2019 10:00)  226 (26 Oct 2019 09:00)  246 (26 Oct 2019 08:00)  250 (26 Oct 2019 07:00)  258 (26 Oct 2019 06:00)  283 (26 Oct 2019 05:00)  258 (26 Oct 2019 04:00)  240 (26 Oct 2019 03:00)  250 (26 Oct 2019 02:00)  221 (26 Oct 2019 01:00)      POCT Blood Glucose.: 104 mg/dL (26 Oct 2019 21:49)  POCT Blood Glucose.: 118 mg/dL (26 Oct 2019 19:48)  POCT Blood Glucose.: 131 mg/dL (26 Oct 2019 16:48)  POCT Blood Glucose.: 135 mg/dL (26 Oct 2019 15:49)  POCT Blood Glucose.: 131 mg/dL (26 Oct 2019 13:55)  POCT Blood Glucose.: 142 mg/dL (26 Oct 2019 12:55)  POCT Blood Glucose.: 153 mg/dL (26 Oct 2019 12:25)  POCT Blood Glucose.: 199 mg/dL (26 Oct 2019 09:49)  POCT Blood Glucose.: 226 mg/dL (26 Oct 2019 08:53)  POCT Blood Glucose.: 250 mg/dL (26 Oct 2019 06:43)  POCT Blood Glucose.: 221 mg/dL (26 Oct 2019 01:09)      Anion Gap, Serum: 16 (10-26 @ 19:54)  Anion Gap, Serum: 19 <H> (10-26 @ 00:47)  Anion Gap, Serum: 12 (10-25 @ 11:38)      Calcium, Total Serum: 7.9 <L> (10-26 @ 19:54)  Calcium, Total Serum: 7.6 <L> (10-26 @ 00:47)  Calcium, Total Serum: 8.8 (10-25 @ 11:38)  Albumin, Serum: 3.4 (10-26 @ 19:54)  Albumin, Serum: 2.5 <L> (10-26 @ 00:47)  Albumin, Serum: 3.4 (10-25 @ 11:38)    Alanine Aminotransferase (ALT/SGPT): 47 <H> (10-26 @ 19:54)  Alanine Aminotransferase (ALT/SGPT): 56 <H> (10-26 @ 00:47)  Alanine Aminotransferase (ALT/SGPT): 99 <H> (10-25 @ 11:38)  Alkaline Phosphatase, Serum: 42 (10-26 @ 19:54)  Alkaline Phosphatase, Serum: 45 (10-26 @ 00:47)  Alkaline Phosphatase, Serum: 81 (10-25 @ 11:38)  Aspartate Aminotransferase (AST/SGOT): 62 <H> (10-26 @ 19:54)  Aspartate Aminotransferase (AST/SGOT): 80 <H> (10-26 @ 00:47)  Aspartate Aminotransferase (AST/SGOT): 45 <H> (10-25 @ 11:38)        10-26    146<H>  |  110<H>  |  26<H>  ----------------------------<  124<H>  4.6   |  20<L>  |  1.42<H>    Ca    7.9<L>      26 Oct 2019 19:54  Phos  3.8     10-25  Mg     2.2     10-25    TPro  5.3<L>  /  Alb  3.4  /  TBili  1.6<H>  /  DBili  x   /  AST  62<H>  /  ALT  47<H>  /  AlkPhos  42  10-26                        10.8   22.68 )-----------( 261      ( 26 Oct 2019 00:47 )             32.4     Medications  MEDICATIONS  (STANDING):  aspirin  chewable 81 milliGRAM(s) Oral daily  atorvastatin 80 milliGRAM(s) Oral at bedtime  cefuroxime  IVPB 1500 milliGRAM(s) IV Intermittent every 8 hours  chlorhexidine 2% Cloths 1 Application(s) Topical <User Schedule>  dextrose 50% Injectable 50 milliLiter(s) IV Push every 15 minutes  dextrose 50% Injectable 25 milliLiter(s) IV Push every 15 minutes  DOBUTamine Infusion 5 MICROgram(s)/kG/Min (8.685 mL/Hr) IV Continuous <Continuous>  heparin  Infusion 500 Unit(s)/Hr (7 mL/Hr) IV Continuous <Continuous>  insulin regular Infusion 3 Unit(s)/Hr (3 mL/Hr) IV Continuous <Continuous>  milrinone Infusion 0.2 MICROgram(s)/kG/Min (3.474 mL/Hr) IV Continuous <Continuous>  pantoprazole  Injectable 40 milliGRAM(s) IV Push daily  polyethylene glycol 3350 17 Gram(s) Oral daily  senna 2 Tablet(s) Oral at bedtime  sodium chloride 0.9%. 1000 milliLiter(s) (10 mL/Hr) IV Continuous <Continuous>      Physical Exam  General: Patient comfortable in bed  Vital Signs Last 12 Hrs  T(F): 99.3 (10-26-19 @ 20:00), Max: 99.9 (10-26-19 @ 16:00)  HR: 84 (10-26-19 @ 22:45) (82 - 90)  BP: --  BP(mean): --  RR: 20 (10-26-19 @ 22:45) (14 - 38)  SpO2: 99% (10-26-19 @ 22:45) (94% - 100%)  Neck: No palpable thyroid nodules.  CVS: S1S2, No murmurs  Respiratory: No wheezing, no crepitations  GI: Abdomen soft, bowel sounds positive  Musculoskeletal:  edema lower extremities.   Skin: No skin rashes, no ecchymosis    Diagnostics

## 2019-10-26 NOTE — PROGRESS NOTE ADULT - SUBJECTIVE AND OBJECTIVE BOX
CRITICAL CARE ATTENDING - CTICU    MEDICATIONS  (STANDING):  aMIOdarone Infusion 0.5 mG/Min (16.667 mL/Hr) IV Continuous <Continuous>  aspirin Suppository 300 milliGRAM(s) Rectal once  cefuroxime  IVPB 1500 milliGRAM(s) IV Intermittent every 8 hours  chlorhexidine 0.12% Liquid 15 milliLiter(s) Oral Mucosa every 12 hours  dextrose 50% Injectable 50 milliLiter(s) IV Push every 15 minutes  dextrose 50% Injectable 25 milliLiter(s) IV Push every 15 minutes  DOBUTamine Infusion 5 MICROgram(s)/kG/Min (8.685 mL/Hr) IV Continuous <Continuous>  insulin regular Infusion 3 Unit(s)/Hr (3 mL/Hr) IV Continuous <Continuous>  meperidine     Injectable 25 milliGRAM(s) IV Push once  milrinone Infusion 0.3 MICROgram(s)/kG/Min (5.211 mL/Hr) IV Continuous <Continuous>  niCARdipine Infusion 5 mG/Hr (25 mL/Hr) IV Continuous <Continuous>  norepinephrine Infusion 0.046 MICROgram(s)/kG/Min (5 mL/Hr) IV Continuous <Continuous>  pantoprazole  Injectable 40 milliGRAM(s) IV Push daily  potassium chloride  10 mEq/50 mL IVPB 10 milliEquivalent(s) IV Intermittent every 1 hour  potassium chloride  10 mEq/50 mL IVPB 10 milliEquivalent(s) IV Intermittent every 1 hour  potassium chloride  10 mEq/50 mL IVPB 10 milliEquivalent(s) IV Intermittent every 1 hour  sodium chloride 0.9%. 1000 milliLiter(s) (10 mL/Hr) IV Continuous <Continuous>  vasopressin Infusion 0.1 Unit(s)/Min (6 mL/Hr) IV Continuous <Continuous>                                    10.8   22.68 )-----------( 261      ( 26 Oct 2019 00:47 )             32.4       10-26    152<H>  |  112<H>  |  20  ----------------------------<  232<H>  3.7   |  21<L>  |  0.83    Ca    7.6<L>      26 Oct 2019 00:47  Phos  3.8     10-25  Mg     2.2     10-25    TPro  4.4<L>  /  Alb  2.5<L>  /  TBili  1.2  /  DBili  x   /  AST  80<H>  /  ALT  56<H>  /  AlkPhos  45  10-26      PT/INR - ( 26 Oct 2019 00:47 )   PT: 13.6 sec;   INR: 1.19 ratio         PTT - ( 26 Oct 2019 00:47 )  PTT:36.3 sec    Mode: AC/ CMV (Assist Control/ Continuous Mandatory Ventilation)  RR (machine): 14  TV (machine): 450  FiO2: 40  PEEP: 5  ITime: 1  MAP: 9.3  PIP: 24      Daily Height in cm: 162.56 (25 Oct 2019 15:34)    Daily       10-25 @ 07:01  -  10-26 @ 07:00  --------------------------------------------------------  IN: 1393 mL / OUT: 725 mL / NET: 668 mL        Critically Ill patient  : [ ] preoperative ,   [x ] post operative    Requires :  [ x] Arterial Line   [x ] Central Line  [x ] PA catheter  [ ] IABP  [ ] ECMO  [ ] LVAD  [ ] Ventilator  [ ] pacemaker [ ] Impella.                      [x ] ABG's     [x ] Pulse Oxymetry Monitoring  Bedside evaluation , monitoring , treatment of hemodynamics , fluids , IVP/ IVCD meds.        Diagnosis:     POD 1 - C4B    Dobutamine drip    Levophed drip    Vasopressin Drip    Hemodynamic lability, instability. Requires IVCD [x] vasopressors [x ] inotropes  [ ] vasodilator  [x ]IVSS fluid  to maintain MAP, perfusion, C.I.     DM    Insulin drip    Ventilator Management:  [ x]AC-rest    [ ]CPAP-PS Wean    [ ]Trach Collar     [ ]Extubate    [ ] T-Piece  [ ]peep>5     Hypernatremia              Discussed with CT surgeon, Physician's Assistant - Nurse Practitioner- Critical care medicine team.   Dicussed at  AM / PM rounds.   Chart, labs , films reviewed.    Total Time: CRITICAL CARE ATTENDING - CTICU    MEDICATIONS  (STANDING):  aMIOdarone Infusion 0.5 mG/Min (16.667 mL/Hr) IV Continuous <Continuous>  aspirin Suppository 300 milliGRAM(s) Rectal once  cefuroxime  IVPB 1500 milliGRAM(s) IV Intermittent every 8 hours  chlorhexidine 0.12% Liquid 15 milliLiter(s) Oral Mucosa every 12 hours  dextrose 50% Injectable 50 milliLiter(s) IV Push every 15 minutes  dextrose 50% Injectable 25 milliLiter(s) IV Push every 15 minutes  DOBUTamine Infusion 5 MICROgram(s)/kG/Min (8.685 mL/Hr) IV Continuous <Continuous>  insulin regular Infusion 3 Unit(s)/Hr (3 mL/Hr) IV Continuous <Continuous>  meperidine     Injectable 25 milliGRAM(s) IV Push once  milrinone Infusion 0.3 MICROgram(s)/kG/Min (5.211 mL/Hr) IV Continuous <Continuous>  niCARdipine Infusion 5 mG/Hr (25 mL/Hr) IV Continuous <Continuous>  norepinephrine Infusion 0.046 MICROgram(s)/kG/Min (5 mL/Hr) IV Continuous <Continuous>  pantoprazole  Injectable 40 milliGRAM(s) IV Push daily  potassium chloride  10 mEq/50 mL IVPB 10 milliEquivalent(s) IV Intermittent every 1 hour  potassium chloride  10 mEq/50 mL IVPB 10 milliEquivalent(s) IV Intermittent every 1 hour  potassium chloride  10 mEq/50 mL IVPB 10 milliEquivalent(s) IV Intermittent every 1 hour  sodium chloride 0.9%. 1000 milliLiter(s) (10 mL/Hr) IV Continuous <Continuous>  vasopressin Infusion 0.1 Unit(s)/Min (6 mL/Hr) IV Continuous <Continuous>                                    10.8   22.68 )-----------( 261      ( 26 Oct 2019 00:47 )             32.4       10-26    152<H>  |  112<H>  |  20  ----------------------------<  232<H>  3.7   |  21<L>  |  0.83    Ca    7.6<L>      26 Oct 2019 00:47  Phos  3.8     10-25  Mg     2.2     10-25    TPro  4.4<L>  /  Alb  2.5<L>  /  TBili  1.2  /  DBili  x   /  AST  80<H>  /  ALT  56<H>  /  AlkPhos  45  10-26      PT/INR - ( 26 Oct 2019 00:47 )   PT: 13.6 sec;   INR: 1.19 ratio         PTT - ( 26 Oct 2019 00:47 )  PTT:36.3 sec    Mode: AC/ CMV (Assist Control/ Continuous Mandatory Ventilation)  RR (machine): 14  TV (machine): 450  FiO2: 40  PEEP: 5  ITime: 1  MAP: 9.3  PIP: 24      Daily Height in cm: 162.56 (25 Oct 2019 15:34)    Daily       10-25 @ 07:01  -  10-26 @ 07:00  --------------------------------------------------------  IN: 1393 mL / OUT: 725 mL / NET: 668 mL        Critically Ill patient  : [ ] preoperative ,   [x ] post operative    Requires :  [ x] Arterial Line   [x ] Central Line  [x ] PA catheter  [ ] IABP  [ ] ECMO  [ ] LVAD  [ ] Ventilator  [ ] pacemaker [ ] Impella.                      [x ] ABG's     [x ] Pulse Oxymetry Monitoring  Bedside evaluation , monitoring , treatment of hemodynamics , fluids , IVP/ IVCD meds.        Diagnosis:     POD 1 - C4B    Dobutamine drip    Levophed drip    Vasopressin Drip    Hemodynamic lability, instability. Requires IVCD [x] vasopressors [x ] inotropes  [ ] vasodilator  [x ]IVSS fluid  to maintain MAP, perfusion, C.I.     DM    Insulin drip    Ventilator Management:  [ x]AC-rest    [ ]CPAP-PS Wean    [ ]Trach Collar     [ ]Extubate    [ ] T-Piece  [ ]peep>5     Hypernatremia    Temporary pacemaker (TPM) interrogation and setting.     Requires  [ ]DDD  [ ]VVI    [ ]AII  temporary pacing at      to maintain HR, MAP, CI, and perfusion.     Hypotension                  Discussed with CT surgeon, Physician's Assistant - Nurse Practitioner- Critical care medicine team.   Dicussed at  AM / PM rounds.   Chart, labs , films reviewed.    Total Time: CRITICAL CARE ATTENDING - CTICU    MEDICATIONS  (STANDING):  aMIOdarone Infusion 0.5 mG/Min (16.667 mL/Hr) IV Continuous <Continuous>  aspirin Suppository 300 milliGRAM(s) Rectal once  cefuroxime  IVPB 1500 milliGRAM(s) IV Intermittent every 8 hours  chlorhexidine 0.12% Liquid 15 milliLiter(s) Oral Mucosa every 12 hours  dextrose 50% Injectable 50 milliLiter(s) IV Push every 15 minutes  dextrose 50% Injectable 25 milliLiter(s) IV Push every 15 minutes  DOBUTamine Infusion 5 MICROgram(s)/kG/Min (8.685 mL/Hr) IV Continuous <Continuous>  insulin regular Infusion 3 Unit(s)/Hr (3 mL/Hr) IV Continuous <Continuous>  meperidine     Injectable 25 milliGRAM(s) IV Push once  milrinone Infusion 0.3 MICROgram(s)/kG/Min (5.211 mL/Hr) IV Continuous <Continuous>  niCARdipine Infusion 5 mG/Hr (25 mL/Hr) IV Continuous <Continuous>  norepinephrine Infusion 0.046 MICROgram(s)/kG/Min (5 mL/Hr) IV Continuous <Continuous>  pantoprazole  Injectable 40 milliGRAM(s) IV Push daily  potassium chloride  10 mEq/50 mL IVPB 10 milliEquivalent(s) IV Intermittent every 1 hour  potassium chloride  10 mEq/50 mL IVPB 10 milliEquivalent(s) IV Intermittent every 1 hour  potassium chloride  10 mEq/50 mL IVPB 10 milliEquivalent(s) IV Intermittent every 1 hour  sodium chloride 0.9%. 1000 milliLiter(s) (10 mL/Hr) IV Continuous <Continuous>  vasopressin Infusion 0.1 Unit(s)/Min (6 mL/Hr) IV Continuous <Continuous>                                    10.8   22.68 )-----------( 261      ( 26 Oct 2019 00:47 )             32.4       10-26    152<H>  |  112<H>  |  20  ----------------------------<  232<H>  3.7   |  21<L>  |  0.83    Ca    7.6<L>      26 Oct 2019 00:47  Phos  3.8     10-25  Mg     2.2     10-25    TPro  4.4<L>  /  Alb  2.5<L>  /  TBili  1.2  /  DBili  x   /  AST  80<H>  /  ALT  56<H>  /  AlkPhos  45  10-26      PT/INR - ( 26 Oct 2019 00:47 )   PT: 13.6 sec;   INR: 1.19 ratio         PTT - ( 26 Oct 2019 00:47 )  PTT:36.3 sec    Mode: AC/ CMV (Assist Control/ Continuous Mandatory Ventilation)  RR (machine): 14  TV (machine): 450  FiO2: 40  PEEP: 5  ITime: 1  MAP: 9.3  PIP: 24      Daily Height in cm: 162.56 (25 Oct 2019 15:34)    Daily       10-25 @ 07:01  -  10-26 @ 07:00  --------------------------------------------------------  IN: 1393 mL / OUT: 725 mL / NET: 668 mL        Critically Ill patient  : [ ] preoperative ,   [x ] post operative    Requires :  [ x] Arterial Line   [x ] Central Line  [x ] PA catheter  [ ] IABP  [ ] ECMO  [ ] LVAD  [ ] Ventilator  [ ] pacemaker [ ] Impella.                      [x ] ABG's     [x ] Pulse Oxymetry Monitoring  Bedside evaluation , monitoring , treatment of hemodynamics , fluids , IVP/ IVCD meds.        Diagnosis:     POD 1 - C4B / MVR    Admitted - NSTEMI    Cardiogenic Shock     CHF- acute [ x]   chronic [ ]    systolic [x ]   diatolic [ ]          - Echo- EF -  35%           [ ] RV dysfunction          - Cxr-cardiomegally, edema          - Clinical-  [x ]inotropes   [x ]pressors   [ ]diuresis   [ x]IABP   [ ]ECMO   [ ]LVAD   [x ]Respiratory Failure          -     Hemodynamic lability, instability. Requires IVCD [x] vasopressors [x ] inotropes  [ ] vasodilator  [x ]IVSS fluid  to maintain MAP, perfusion, C.I.     DM    Insulin drip    Hypernatremia    Temporary pacemaker (TPM) interrogation and setting.     Requires  [ ]DDD  [x ]VVI    [ ]AII  temporary pacing at 90      to maintain HR, MAP, CI, and perfusion.     Hypotension    IABP   [ x] management   [ ] wean 1:1 1:2 1:3   [ ] removal and f/u vascular checks     respiratory failure     Requires chest PT, pulmonary toilet, ambu bagging, suctioning to maintain SaO2,  patent airway and treat atelectasis.     Ventilator Management:  [ x]AC-rest    [ x]CPAP-PS Wean    [ ]Trach Collar     [ ]Extubate    [ ] T-Piece  [ ]peep>5     Charlotte Bartolome catheter interpretation and therapeutic management of unstable hemodynamics                           Discussed with CT surgeon, Physician's Assistant - Nurse Practitioner- Critical care medicine team.   Dicussed at  AM / PM rounds.   Chart, labs , films reviewed.    Total Time: 30 min

## 2019-10-27 LAB
ALBUMIN SERPL ELPH-MCNC: 3.6 G/DL — SIGNIFICANT CHANGE UP (ref 3.3–5)
ALP SERPL-CCNC: 44 U/L — SIGNIFICANT CHANGE UP (ref 40–120)
ALT FLD-CCNC: 40 U/L — SIGNIFICANT CHANGE UP (ref 10–45)
ANION GAP SERPL CALC-SCNC: 15 MMOL/L — SIGNIFICANT CHANGE UP (ref 5–17)
APTT BLD: 36.8 SEC — HIGH (ref 27.5–36.3)
APTT BLD: 79.4 SEC — HIGH (ref 27.5–36.3)
APTT BLD: 81.8 SEC — HIGH (ref 27.5–36.3)
APTT BLD: 95.2 SEC — HIGH (ref 27.5–36.3)
AST SERPL-CCNC: 65 U/L — HIGH (ref 10–40)
BASE EXCESS BLDMV CALC-SCNC: -0.5 MMOL/L — SIGNIFICANT CHANGE UP (ref -3–3)
BASE EXCESS BLDMV CALC-SCNC: -1.9 MMOL/L — SIGNIFICANT CHANGE UP (ref -3–3)
BASE EXCESS BLDMV CALC-SCNC: -3.3 MMOL/L — LOW (ref -3–3)
BASE EXCESS BLDMV CALC-SCNC: -3.4 MMOL/L — LOW (ref -3–3)
BASE EXCESS BLDMV CALC-SCNC: -3.5 MMOL/L — LOW (ref -3–3)
BASE EXCESS BLDMV CALC-SCNC: 0 MMOL/L — SIGNIFICANT CHANGE UP (ref -3–3)
BASE EXCESS BLDV CALC-SCNC: -1.7 MMOL/L — SIGNIFICANT CHANGE UP (ref -2–2)
BASE EXCESS BLDV CALC-SCNC: -3.4 MMOL/L — LOW (ref -2–2)
BILIRUB SERPL-MCNC: 2 MG/DL — HIGH (ref 0.2–1.2)
BUN SERPL-MCNC: 28 MG/DL — HIGH (ref 7–23)
CALCIUM SERPL-MCNC: 8.3 MG/DL — LOW (ref 8.4–10.5)
CHLORIDE SERPL-SCNC: 110 MMOL/L — HIGH (ref 96–108)
CO2 BLDMV-SCNC: 22 MMOL/L — SIGNIFICANT CHANGE UP (ref 21–29)
CO2 BLDMV-SCNC: 23 MMOL/L — SIGNIFICANT CHANGE UP (ref 21–29)
CO2 BLDMV-SCNC: 24 MMOL/L — SIGNIFICANT CHANGE UP (ref 21–29)
CO2 BLDMV-SCNC: 25 MMOL/L — SIGNIFICANT CHANGE UP (ref 21–29)
CO2 BLDV-SCNC: 21 MMOL/L — LOW (ref 22–30)
CO2 BLDV-SCNC: 23 MMOL/L — SIGNIFICANT CHANGE UP (ref 22–30)
CO2 SERPL-SCNC: 20 MMOL/L — LOW (ref 22–31)
CREAT SERPL-MCNC: 1.54 MG/DL — HIGH (ref 0.5–1.3)
GAS PNL BLDA: SIGNIFICANT CHANGE UP
GAS PNL BLDMV: SIGNIFICANT CHANGE UP
GLUCOSE BLDC GLUCOMTR-MCNC: 121 MG/DL — HIGH (ref 70–99)
GLUCOSE BLDC GLUCOMTR-MCNC: 122 MG/DL — HIGH (ref 70–99)
GLUCOSE BLDC GLUCOMTR-MCNC: 122 MG/DL — HIGH (ref 70–99)
GLUCOSE BLDC GLUCOMTR-MCNC: 124 MG/DL — HIGH (ref 70–99)
GLUCOSE BLDC GLUCOMTR-MCNC: 140 MG/DL — HIGH (ref 70–99)
GLUCOSE BLDC GLUCOMTR-MCNC: 153 MG/DL — HIGH (ref 70–99)
GLUCOSE BLDC GLUCOMTR-MCNC: 154 MG/DL — HIGH (ref 70–99)
GLUCOSE BLDC GLUCOMTR-MCNC: 156 MG/DL — HIGH (ref 70–99)
GLUCOSE BLDC GLUCOMTR-MCNC: 162 MG/DL — HIGH (ref 70–99)
GLUCOSE BLDC GLUCOMTR-MCNC: 172 MG/DL — HIGH (ref 70–99)
GLUCOSE BLDC GLUCOMTR-MCNC: 176 MG/DL — HIGH (ref 70–99)
GLUCOSE BLDC GLUCOMTR-MCNC: 226 MG/DL — HIGH (ref 70–99)
GLUCOSE BLDC GLUCOMTR-MCNC: 239 MG/DL — HIGH (ref 70–99)
GLUCOSE BLDC GLUCOMTR-MCNC: 55 MG/DL — LOW (ref 70–99)
GLUCOSE BLDC GLUCOMTR-MCNC: 83 MG/DL — SIGNIFICANT CHANGE UP (ref 70–99)
GLUCOSE SERPL-MCNC: 145 MG/DL — HIGH (ref 70–99)
HCO3 BLDMV-SCNC: 20 MMOL/L — SIGNIFICANT CHANGE UP (ref 20–28)
HCO3 BLDMV-SCNC: 21 MMOL/L — SIGNIFICANT CHANGE UP (ref 20–28)
HCO3 BLDMV-SCNC: 21 MMOL/L — SIGNIFICANT CHANGE UP (ref 20–28)
HCO3 BLDMV-SCNC: 22 MMOL/L — SIGNIFICANT CHANGE UP (ref 20–28)
HCO3 BLDMV-SCNC: 23 MMOL/L — SIGNIFICANT CHANGE UP (ref 20–28)
HCO3 BLDMV-SCNC: 24 MMOL/L — SIGNIFICANT CHANGE UP (ref 20–28)
HCO3 BLDV-SCNC: 20 MMOL/L — LOW (ref 21–29)
HCO3 BLDV-SCNC: 22 MMOL/L — SIGNIFICANT CHANGE UP (ref 21–29)
HCT VFR BLD CALC: 27.8 % — LOW (ref 34.5–45)
HGB BLD-MCNC: 9.3 G/DL — LOW (ref 11.5–15.5)
HOROWITZ INDEX BLDMV+IHG-RTO: 40 — SIGNIFICANT CHANGE UP
HOROWITZ INDEX BLDMV+IHG-RTO: 50 — SIGNIFICANT CHANGE UP
HOROWITZ INDEX BLDV+IHG-RTO: 40 — SIGNIFICANT CHANGE UP
HOROWITZ INDEX BLDV+IHG-RTO: 40 — SIGNIFICANT CHANGE UP
INR BLD: 1.39 RATIO — HIGH (ref 0.88–1.16)
INR BLD: 1.4 RATIO — HIGH (ref 0.88–1.16)
MAGNESIUM SERPL-MCNC: 3.6 MG/DL — HIGH (ref 1.6–2.6)
MCHC RBC-ENTMCNC: 27.5 PG — SIGNIFICANT CHANGE UP (ref 27–34)
MCHC RBC-ENTMCNC: 33.5 GM/DL — SIGNIFICANT CHANGE UP (ref 32–36)
MCV RBC AUTO: 82.2 FL — SIGNIFICANT CHANGE UP (ref 80–100)
NRBC # BLD: 0 /100 WBCS — SIGNIFICANT CHANGE UP (ref 0–0)
O2 CT VFR BLD CALC: 26 MMHG — LOW (ref 30–65)
O2 CT VFR BLD CALC: 32 MMHG — SIGNIFICANT CHANGE UP (ref 30–65)
O2 CT VFR BLD CALC: 32 MMHG — SIGNIFICANT CHANGE UP (ref 30–65)
O2 CT VFR BLD CALC: 34 MMHG — SIGNIFICANT CHANGE UP (ref 30–65)
O2 CT VFR BLD CALC: 36 MMHG — SIGNIFICANT CHANGE UP (ref 30–65)
O2 CT VFR BLD CALC: 39 MMHG — SIGNIFICANT CHANGE UP (ref 30–65)
PCO2 BLDMV: 35 MMHG — SIGNIFICANT CHANGE UP (ref 30–65)
PCO2 BLDMV: 36 MMHG — SIGNIFICANT CHANGE UP (ref 30–65)
PCO2 BLDV: 33 MMHG — LOW (ref 35–50)
PCO2 BLDV: 33 MMHG — LOW (ref 35–50)
PH BLDMV: 7.38 — SIGNIFICANT CHANGE UP (ref 7.32–7.45)
PH BLDMV: 7.41 — SIGNIFICANT CHANGE UP (ref 7.32–7.45)
PH BLDMV: 7.42 — SIGNIFICANT CHANGE UP (ref 7.32–7.45)
PH BLDMV: 7.43 — SIGNIFICANT CHANGE UP (ref 7.32–7.45)
PH BLDV: 7.41 — SIGNIFICANT CHANGE UP (ref 7.35–7.45)
PH BLDV: 7.43 — SIGNIFICANT CHANGE UP (ref 7.35–7.45)
PHOSPHATE SERPL-MCNC: 4.7 MG/DL — HIGH (ref 2.5–4.5)
PLATELET # BLD AUTO: 122 K/UL — LOW (ref 150–400)
PO2 BLDV: 35 MMHG — SIGNIFICANT CHANGE UP (ref 25–45)
PO2 BLDV: 39 MMHG — SIGNIFICANT CHANGE UP (ref 25–45)
POTASSIUM SERPL-MCNC: 4.6 MMOL/L — SIGNIFICANT CHANGE UP (ref 3.5–5.3)
POTASSIUM SERPL-SCNC: 4.6 MMOL/L — SIGNIFICANT CHANGE UP (ref 3.5–5.3)
PROT SERPL-MCNC: 5.4 G/DL — LOW (ref 6–8.3)
PROTHROM AB SERPL-ACNC: 16.1 SEC — HIGH (ref 10–12.9)
PROTHROM AB SERPL-ACNC: 16.2 SEC — HIGH (ref 10–12.9)
RBC # BLD: 3.38 M/UL — LOW (ref 3.8–5.2)
RBC # FLD: 15.6 % — HIGH (ref 10.3–14.5)
SAO2 % BLDMV: 42 % — LOW (ref 60–90)
SAO2 % BLDMV: 54 % — LOW (ref 60–90)
SAO2 % BLDMV: 56 % — LOW (ref 60–90)
SAO2 % BLDMV: 60 % — SIGNIFICANT CHANGE UP (ref 60–90)
SAO2 % BLDMV: 64 % — SIGNIFICANT CHANGE UP (ref 60–90)
SAO2 % BLDMV: 69 % — SIGNIFICANT CHANGE UP (ref 60–90)
SAO2 % BLDV: 60 % — LOW (ref 67–88)
SAO2 % BLDV: 69 % — SIGNIFICANT CHANGE UP (ref 67–88)
SODIUM SERPL-SCNC: 145 MMOL/L — SIGNIFICANT CHANGE UP (ref 135–145)
WBC # BLD: 13.72 K/UL — HIGH (ref 3.8–10.5)
WBC # FLD AUTO: 13.72 K/UL — HIGH (ref 3.8–10.5)

## 2019-10-27 PROCEDURE — 93306 TTE W/DOPPLER COMPLETE: CPT | Mod: 26

## 2019-10-27 PROCEDURE — 71045 X-RAY EXAM CHEST 1 VIEW: CPT | Mod: 26

## 2019-10-27 PROCEDURE — 93010 ELECTROCARDIOGRAM REPORT: CPT

## 2019-10-27 PROCEDURE — 99291 CRITICAL CARE FIRST HOUR: CPT

## 2019-10-27 RX ORDER — LIDOCAINE HCL 20 MG/ML
0.5 VIAL (ML) INJECTION
Qty: 2 | Refills: 0 | Status: DISCONTINUED | OUTPATIENT
Start: 2019-10-27 | End: 2019-10-27

## 2019-10-27 RX ORDER — BUMETANIDE 0.25 MG/ML
2 INJECTION INTRAMUSCULAR; INTRAVENOUS ONCE
Refills: 0 | Status: COMPLETED | OUTPATIENT
Start: 2019-10-27 | End: 2019-10-27

## 2019-10-27 RX ORDER — AMIODARONE HYDROCHLORIDE 400 MG/1
0.5 TABLET ORAL
Qty: 900 | Refills: 0 | Status: DISCONTINUED | OUTPATIENT
Start: 2019-10-27 | End: 2019-10-28

## 2019-10-27 RX ORDER — FENTANYL CITRATE 50 UG/ML
25 INJECTION INTRAVENOUS ONCE
Refills: 0 | Status: DISCONTINUED | OUTPATIENT
Start: 2019-10-27 | End: 2019-10-27

## 2019-10-27 RX ORDER — DEXTROSE 50 % IN WATER 50 %
25 SYRINGE (ML) INTRAVENOUS
Refills: 0 | Status: COMPLETED | OUTPATIENT
Start: 2019-10-27 | End: 2019-10-27

## 2019-10-27 RX ORDER — POTASSIUM CHLORIDE 20 MEQ
10 PACKET (EA) ORAL ONCE
Refills: 0 | Status: COMPLETED | OUTPATIENT
Start: 2019-10-27 | End: 2019-10-27

## 2019-10-27 RX ORDER — FENTANYL CITRATE 50 UG/ML
50 INJECTION INTRAVENOUS ONCE
Refills: 0 | Status: DISCONTINUED | OUTPATIENT
Start: 2019-10-27 | End: 2019-10-27

## 2019-10-27 RX ORDER — LIDOCAINE HCL 20 MG/ML
100 VIAL (ML) INJECTION ONCE
Refills: 0 | Status: COMPLETED | OUTPATIENT
Start: 2019-10-27 | End: 2019-10-27

## 2019-10-27 RX ORDER — DEXMEDETOMIDINE HYDROCHLORIDE IN 0.9% SODIUM CHLORIDE 4 UG/ML
0.7 INJECTION INTRAVENOUS
Qty: 200 | Refills: 0 | Status: DISCONTINUED | OUTPATIENT
Start: 2019-10-27 | End: 2019-10-27

## 2019-10-27 RX ORDER — DOBUTAMINE HCL 250MG/20ML
1 VIAL (ML) INTRAVENOUS
Qty: 500 | Refills: 0 | Status: DISCONTINUED | OUTPATIENT
Start: 2019-10-27 | End: 2019-10-31

## 2019-10-27 RX ORDER — AMIODARONE HYDROCHLORIDE 400 MG/1
150 TABLET ORAL ONCE
Refills: 0 | Status: COMPLETED | OUTPATIENT
Start: 2019-10-27 | End: 2019-10-27

## 2019-10-27 RX ORDER — ACETAMINOPHEN 500 MG
1000 TABLET ORAL ONCE
Refills: 0 | Status: COMPLETED | OUTPATIENT
Start: 2019-10-27 | End: 2019-10-27

## 2019-10-27 RX ORDER — MAGNESIUM SULFATE 500 MG/ML
1 VIAL (ML) INJECTION ONCE
Refills: 0 | Status: COMPLETED | OUTPATIENT
Start: 2019-10-27 | End: 2019-10-27

## 2019-10-27 RX ORDER — CALCIUM GLUCONATE 100 MG/ML
1 VIAL (ML) INTRAVENOUS ONCE
Refills: 0 | Status: COMPLETED | OUTPATIENT
Start: 2019-10-27 | End: 2019-10-27

## 2019-10-27 RX ORDER — LIDOCAINE HCL 20 MG/ML
50 VIAL (ML) INJECTION ONCE
Refills: 0 | Status: COMPLETED | OUTPATIENT
Start: 2019-10-27 | End: 2019-10-27

## 2019-10-27 RX ORDER — DOBUTAMINE HCL 250MG/20ML
3 VIAL (ML) INTRAVENOUS
Qty: 1000 | Refills: 0 | Status: DISCONTINUED | OUTPATIENT
Start: 2019-10-27 | End: 2019-10-27

## 2019-10-27 RX ORDER — SODIUM BICARBONATE 1 MEQ/ML
50 SYRINGE (ML) INTRAVENOUS ONCE
Refills: 0 | Status: COMPLETED | OUTPATIENT
Start: 2019-10-27 | End: 2019-10-27

## 2019-10-27 RX ORDER — POTASSIUM CHLORIDE 20 MEQ
10 PACKET (EA) ORAL
Refills: 0 | Status: COMPLETED | OUTPATIENT
Start: 2019-10-27 | End: 2019-10-27

## 2019-10-27 RX ADMIN — MILRINONE LACTATE 3.47 MICROGRAM(S)/KG/MIN: 1 INJECTION, SOLUTION INTRAVENOUS at 00:55

## 2019-10-27 RX ADMIN — Medication 3.75 MG/MIN: at 09:54

## 2019-10-27 RX ADMIN — Medication 50 MILLIEQUIVALENT(S): at 04:11

## 2019-10-27 RX ADMIN — PANTOPRAZOLE SODIUM 40 MILLIGRAM(S): 20 TABLET, DELAYED RELEASE ORAL at 12:58

## 2019-10-27 RX ADMIN — Medication 200 GRAM(S): at 22:51

## 2019-10-27 RX ADMIN — Medication 3.47 MICROGRAM(S)/KG/MIN: at 09:54

## 2019-10-27 RX ADMIN — Medication 100 GRAM(S): at 07:08

## 2019-10-27 RX ADMIN — FENTANYL CITRATE 25 MICROGRAM(S): 50 INJECTION INTRAVENOUS at 15:05

## 2019-10-27 RX ADMIN — HEPARIN SODIUM 7 UNIT(S)/HR: 5000 INJECTION INTRAVENOUS; SUBCUTANEOUS at 00:54

## 2019-10-27 RX ADMIN — CHLORHEXIDINE GLUCONATE 1 APPLICATION(S): 213 SOLUTION TOPICAL at 06:20

## 2019-10-27 RX ADMIN — FENTANYL CITRATE 50 MICROGRAM(S): 50 INJECTION INTRAVENOUS at 17:15

## 2019-10-27 RX ADMIN — FENTANYL CITRATE 25 MICROGRAM(S): 50 INJECTION INTRAVENOUS at 14:25

## 2019-10-27 RX ADMIN — Medication 100 MILLIGRAM(S): at 01:00

## 2019-10-27 RX ADMIN — FENTANYL CITRATE 25 MICROGRAM(S): 50 INJECTION INTRAVENOUS at 15:20

## 2019-10-27 RX ADMIN — AMIODARONE HYDROCHLORIDE 33.33 MG/MIN: 400 TABLET ORAL at 07:08

## 2019-10-27 RX ADMIN — Medication 1000 MILLIGRAM(S): at 11:05

## 2019-10-27 RX ADMIN — Medication 8.69 MICROGRAM(S)/KG/MIN: at 00:55

## 2019-10-27 RX ADMIN — MILRINONE LACTATE 3.47 MICROGRAM(S)/KG/MIN: 1 INJECTION, SOLUTION INTRAVENOUS at 09:56

## 2019-10-27 RX ADMIN — Medication 50 MILLIEQUIVALENT(S): at 17:49

## 2019-10-27 RX ADMIN — FENTANYL CITRATE 50 MICROGRAM(S): 50 INJECTION INTRAVENOUS at 17:30

## 2019-10-27 RX ADMIN — Medication 25 MILLILITER(S): at 19:05

## 2019-10-27 RX ADMIN — Medication 50 MILLIEQUIVALENT(S): at 22:30

## 2019-10-27 RX ADMIN — Medication 50 MILLIGRAM(S): at 08:37

## 2019-10-27 RX ADMIN — SENNA PLUS 2 TABLET(S): 8.6 TABLET ORAL at 21:39

## 2019-10-27 RX ADMIN — INSULIN HUMAN 3 UNIT(S)/HR: 100 INJECTION, SOLUTION SUBCUTANEOUS at 00:54

## 2019-10-27 RX ADMIN — INSULIN HUMAN 3 UNIT(S)/HR: 100 INJECTION, SOLUTION SUBCUTANEOUS at 09:53

## 2019-10-27 RX ADMIN — HEPARIN SODIUM 8.5 UNIT(S)/HR: 5000 INJECTION INTRAVENOUS; SUBCUTANEOUS at 09:55

## 2019-10-27 RX ADMIN — Medication 100 MILLIGRAM(S): at 03:30

## 2019-10-27 RX ADMIN — AMIODARONE HYDROCHLORIDE 33.33 MG/MIN: 400 TABLET ORAL at 09:54

## 2019-10-27 RX ADMIN — FENTANYL CITRATE 25 MICROGRAM(S): 50 INJECTION INTRAVENOUS at 14:10

## 2019-10-27 RX ADMIN — Medication 50 MILLIEQUIVALENT(S): at 15:27

## 2019-10-27 RX ADMIN — BUMETANIDE 2 MILLIGRAM(S): 0.25 INJECTION INTRAMUSCULAR; INTRAVENOUS at 02:08

## 2019-10-27 RX ADMIN — Medication 50 GRAM(S): at 00:00

## 2019-10-27 RX ADMIN — HEPARIN SODIUM 7 UNIT(S)/HR: 5000 INJECTION INTRAVENOUS; SUBCUTANEOUS at 22:51

## 2019-10-27 RX ADMIN — Medication 400 MILLIGRAM(S): at 10:48

## 2019-10-27 RX ADMIN — AMIODARONE HYDROCHLORIDE 600 MILLIGRAM(S): 400 TABLET ORAL at 07:08

## 2019-10-27 RX ADMIN — Medication 50 MILLIEQUIVALENT(S): at 09:51

## 2019-10-27 RX ADMIN — ATORVASTATIN CALCIUM 80 MILLIGRAM(S): 80 TABLET, FILM COATED ORAL at 21:39

## 2019-10-27 RX ADMIN — BUMETANIDE 2 MILLIGRAM(S): 0.25 INJECTION INTRAMUSCULAR; INTRAVENOUS at 09:52

## 2019-10-27 RX ADMIN — Medication 100 MILLIGRAM(S): at 09:51

## 2019-10-27 RX ADMIN — AMIODARONE HYDROCHLORIDE 600 MILLIGRAM(S): 400 TABLET ORAL at 04:12

## 2019-10-27 RX ADMIN — Medication 81 MILLIGRAM(S): at 12:58

## 2019-10-27 RX ADMIN — Medication 50 MILLIEQUIVALENT(S): at 17:15

## 2019-10-27 NOTE — PROGRESS NOTE ADULT - PROBLEM SELECTOR PLAN 1
Will continue insulin drip for now. Will continue monitoring FS, log, and FU.  Once patient is eating meals, will switch to basal bolus insulin. Once patient is eating meals, will switch to basal bolus insulin.

## 2019-10-27 NOTE — PHYSICAL THERAPY INITIAL EVALUATION ADULT - DID THE PATIENT HAVE SURGERY?
CABG x4 (LIMA to OM, DELIA to LAD, SVG to PRICE, Radial artery Y graft to PDA), MVR (mitral valve replacement), Clipping, left atrial appendage/yes

## 2019-10-27 NOTE — PROVIDER CONTACT NOTE (CHANGE IN STATUS NOTIFICATION) - ASSESSMENT
Increased PVC's, intermittently pacing    2.5 mcg/kg/min  primacor .2 mcg/kg/min  Lidocaine .5 mg/min  Pt neurologically intact, denies pain, denies chest pain, denies SOB

## 2019-10-27 NOTE — PROVIDER CONTACT NOTE (CHANGE IN STATUS NOTIFICATION) - ACTION/TREATMENT ORDERED:
As per provider:   decreased to 2 mcg/kg/min  Lidocaine increased to 1 mg/min  Amio 150 mg bolus given IVPB   Amio gtt started at 1 mg/min  1 g magnesium given   * see EMAR  Providers aware of pt status will continue to monitor closely

## 2019-10-27 NOTE — PROGRESS NOTE ADULT - SUBJECTIVE AND OBJECTIVE BOX
CRITICAL CARE ATTENDING - CTICU    MEDICATIONS  (STANDING):  aspirin  chewable 81 milliGRAM(s) Oral daily  atorvastatin 80 milliGRAM(s) Oral at bedtime  cefuroxime  IVPB 1500 milliGRAM(s) IV Intermittent every 8 hours  chlorhexidine 2% Cloths 1 Application(s) Topical <User Schedule>  dextrose 50% Injectable 50 milliLiter(s) IV Push every 15 minutes  dextrose 50% Injectable 25 milliLiter(s) IV Push every 15 minutes  DOBUTamine Infusion 2.5 MICROgram(s)/kG/Min (4.343 mL/Hr) IV Continuous <Continuous>  heparin  Infusion 500 Unit(s)/Hr (9 mL/Hr) IV Continuous <Continuous>  insulin regular Infusion 3 Unit(s)/Hr (3 mL/Hr) IV Continuous <Continuous>  lidocaine   Infusion 0.5 mG/Min (3.75 mL/Hr) IV Continuous <Continuous>  milrinone Infusion 0.2 MICROgram(s)/kG/Min (3.474 mL/Hr) IV Continuous <Continuous>  pantoprazole  Injectable 40 milliGRAM(s) IV Push daily  polyethylene glycol 3350 17 Gram(s) Oral daily  senna 2 Tablet(s) Oral at bedtime  sodium chloride 0.9%. 1000 milliLiter(s) (10 mL/Hr) IV Continuous <Continuous>                                    9.3    13.72 )-----------( 122      ( 27 Oct 2019 01:01 )             27.8       10-    145  |  110<H>  |  28<H>  ----------------------------<  145<H>  4.6   |  20<L>  |  1.54<H>    Ca    8.3<L>      27 Oct 2019 01:01  Phos  4.7     10-27  Mg     3.6     10-27    TPro  5.4<L>  /  Alb  3.6  /  TBili  2.0<H>  /  DBili  x   /  AST  65<H>  /  ALT  40  /  AlkPhos  44  10      PT/INR - ( 27 Oct 2019 01:01 )   PT: 16.2 sec;   INR: 1.39 ratio         PTT - ( 27 Oct 2019 01:01 )  PTT:36.8 sec    Mode: CPAP with PS  FiO2: 40  PEEP: 5  PS: 5  MAP: 10  PIP: 17      Daily     Daily Weight in k.2 (27 Oct 2019 00:00)      10-25 @ :01  -  10-26 @ 07:00  --------------------------------------------------------  IN: 1393 mL / OUT: 725 mL / NET: 668 mL    10-26 @ :01  -  10-27 @ 06:48  --------------------------------------------------------  IN: 2460.1 mL / OUT: 1677 mL / NET: 783.1 mL        Critically Ill patient  : [ ] preoperative ,   [ x] post operative    Requires :  [ x] Arterial Line   [x ] Central Line  [x] PA catheter  [ ] IABP  [ ] ECMO  [ ] LVAD  [ ] Ventilator  [ ] pacemaker [ ] Impella.                      [x ] ABG's     [ x] Pulse Oxymetry Monitoring  Bedside evaluation , monitoring , treatment of hemodynamics , fluids , IVP/ IVCD meds.        Diagnosis:     POD 1 - C4B / MVR    Admitted - NSTEMI    Cardiogenic Shock     CHF- acute [ x]   chronic [ ]    systolic [x ]   diatolic [ ]          - Echo- EF -  35%           [ ] RV dysfunction          - Cxr-cardiomegally, edema          - Clinical-  [x ]inotropes   [x ]pressors   [ ]diuresis   [ x]IABP   [ ]ECMO   [ ]LVAD   [x ]Respiratory Failure          -     Hemodynamic lability, instability. Requires IVCD [x] vasopressors [x ] inotropes  [ ] vasodilator  [x ]IVSS fluid  to maintain MAP, perfusion, C.I.     DM    Insulin drip    Hypernatremia    Temporary pacemaker (TPM) interrogation and setting.     Requires  [ ]DDD  [x ]VVI    [ ]AII  temporary pacing at 90      to maintain HR, MAP, CI, and perfusion.     Hypotension    IABP   [ x] management   [ ] wean 1:1 1:2 1:3   [ ] removal and f/u vascular checks     respiratory failure     Requires chest PT, pulmonary toilet, ambu bagging, suctioning to maintain SaO2,  patent airway and treat atelectasis.     Ventilator Management:  [ x]AC-rest    [ x]CPAP-PS Wean    [ ]Trach Collar     [ ]Extubate    [ ] T-Piece  [ ]peep>5     Thatcher Bartolome catheter interpretation and therapeutic management of unstable hemodynamics                 Discussed with CT surgeon, Physician's Assistant - Nurse Practitioner- Critical care medicine team.   Dicussed at  AM / PM rounds.   Chart, labs , films reviewed.    Total Time: CRITICAL CARE ATTENDING - CTICU    MEDICATIONS  (STANDING):  aspirin  chewable 81 milliGRAM(s) Oral daily  atorvastatin 80 milliGRAM(s) Oral at bedtime  cefuroxime  IVPB 1500 milliGRAM(s) IV Intermittent every 8 hours  chlorhexidine 2% Cloths 1 Application(s) Topical <User Schedule>  dextrose 50% Injectable 50 milliLiter(s) IV Push every 15 minutes  dextrose 50% Injectable 25 milliLiter(s) IV Push every 15 minutes  DOBUTamine Infusion 2.5 MICROgram(s)/kG/Min (4.343 mL/Hr) IV Continuous <Continuous>  heparin  Infusion 500 Unit(s)/Hr (9 mL/Hr) IV Continuous <Continuous>  insulin regular Infusion 3 Unit(s)/Hr (3 mL/Hr) IV Continuous <Continuous>  lidocaine   Infusion 0.5 mG/Min (3.75 mL/Hr) IV Continuous <Continuous>  milrinone Infusion 0.2 MICROgram(s)/kG/Min (3.474 mL/Hr) IV Continuous <Continuous>  pantoprazole  Injectable 40 milliGRAM(s) IV Push daily  polyethylene glycol 3350 17 Gram(s) Oral daily  senna 2 Tablet(s) Oral at bedtime  sodium chloride 0.9%. 1000 milliLiter(s) (10 mL/Hr) IV Continuous <Continuous>                                    9.3    13.72 )-----------( 122      ( 27 Oct 2019 01:01 )             27.8       10-    145  |  110<H>  |  28<H>  ----------------------------<  145<H>  4.6   |  20<L>  |  1.54<H>    Ca    8.3<L>      27 Oct 2019 01:01  Phos  4.7     10-27  Mg     3.6     10-27    TPro  5.4<L>  /  Alb  3.6  /  TBili  2.0<H>  /  DBili  x   /  AST  65<H>  /  ALT  40  /  AlkPhos  44  10      PT/INR - ( 27 Oct 2019 01:01 )   PT: 16.2 sec;   INR: 1.39 ratio         PTT - ( 27 Oct 2019 01:01 )  PTT:36.8 sec    Mode: CPAP with PS  FiO2: 40  PEEP: 5  PS: 5  MAP: 10  PIP: 17      Daily     Daily Weight in k.2 (27 Oct 2019 00:00)      10-25 @ :01  -  10-26 @ 07:00  --------------------------------------------------------  IN: 1393 mL / OUT: 725 mL / NET: 668 mL    10-26 @ :01  -  10-27 @ 06:48  --------------------------------------------------------  IN: 2460.1 mL / OUT: 1677 mL / NET: 783.1 mL        Critically Ill patient  : [ ] preoperative ,   [ x] post operative    Requires :  [ x] Arterial Line   [x ] Central Line  [x] PA catheter  [ ] IABP  [ ] ECMO  [ ] LVAD  [ ] Ventilator  [ ] pacemaker [ ] Impella.                      [x ] ABG's     [ x] Pulse Oxymetry Monitoring  Bedside evaluation , monitoring , treatment of hemodynamics , fluids , IVP/ IVCD meds.        Diagnosis:     POD 1 - C4B / MVR    Admitted - NSTEMI    Cardiogenic Shock  - resolving    CHF- acute [ x]   chronic [ ]    systolic [x ]   diatolic [ ]          - Echo- EF -  35%           [ ] RV dysfunction          - Cxr-cardiomegally, edema          - Clinical-  [x ]inotropes   [x ]pressors   [ x]diuresis   [ x]IABP   [ ]ECMO   [ ]LVAD   [ ]Respiratory Failure          -     Hemodynamic lability, instability. Requires IVCD [x] vasopressors [x ] inotropes  [ ] vasodilator  [x ]IVSS fluid  to maintain MAP, perfusion, C.I.     DM    Insulin drip      Temporary pacemaker (TPM) interrogation and setting.     Requires  [ ]DDD  [x ]VVI    [ ]AII  temporary pacing at 90      to maintain HR, MAP, CI, and perfusion.     V Tach.    Hypotension    IABP   [ x] management   [ ] wean 1:1 1:2 1:3   [ ] removal and f/u vascular checks     Requires chest PT, pulmonary toilet, , suctioning to maintain SaO2,  patent airway and treat atelectasis.     Ventilator Management:  [ x]AC-rest    [ x]CPAP-PS Wean    [ ]Trach Collar     [x ]Extubate    [ ] T-Piece  [ ]peep>5     respiratory failure - BIPAP    Talmage Bartolome catheter interpretation and therapeutic management of unstable hemodynamics     Thrombocytopenia      Renal Failure - Acute Kidney Injury                 Discussed with CT surgeon, Physician's Assistant - Nurse Practitioner- Critical care medicine team.   Dicussed at  AM / PM rounds.   Chart, labs , films reviewed.    Total Time: 30 min

## 2019-10-27 NOTE — PROGRESS NOTE ADULT - ASSESSMENT
Assessment  DMT2: 63y Female with DM T2 with hyperglycemia, was on oral meds, now S/P CABG on insulin drip, FS elevated and not at target, no hypoglycemic episode, patient is not yet eating meals, appears comfortable, family by the bedside.   CAD: S/P CABG 10/25, on medications, no chest pain, stable, monitored.  HTN: Controlled,  on antihypertensive medications.  HLD: On statin, compliant with  intake.          Mireya Niño MD  Cell: [phone]  Office: [phone] Assessment  DMT2: 63y Female with DM T2 with hyperglycemia, was on oral meds, now S/P CABG on insulin drip, FS elevated and not at target,  no hypoglycemic episode, patient is not yet eating meals, appears comfortable, family by the bedside.   CAD: S/P CABG 10/25, on medications, no chest pain, stable, monitored.  HTN: Controlled,  on antihypertensive medications.  HLD: On statin, compliant with  intake.          Mireya Niño MD  Cell: [phone]  Office: [phone]

## 2019-10-28 LAB
ALBUMIN SERPL ELPH-MCNC: 2.8 G/DL — LOW (ref 3.3–5)
ALBUMIN SERPL ELPH-MCNC: 3.2 G/DL — LOW (ref 3.3–5)
ALP SERPL-CCNC: 55 U/L — SIGNIFICANT CHANGE UP (ref 40–120)
ALP SERPL-CCNC: 78 U/L — SIGNIFICANT CHANGE UP (ref 40–120)
ALT FLD-CCNC: 41 U/L — SIGNIFICANT CHANGE UP (ref 10–45)
ALT FLD-CCNC: 52 U/L — HIGH (ref 10–45)
ANION GAP SERPL CALC-SCNC: 11 MMOL/L — SIGNIFICANT CHANGE UP (ref 5–17)
ANION GAP SERPL CALC-SCNC: 12 MMOL/L — SIGNIFICANT CHANGE UP (ref 5–17)
APTT BLD: 54.5 SEC — HIGH (ref 27.5–36.3)
AST SERPL-CCNC: 41 U/L — HIGH (ref 10–40)
AST SERPL-CCNC: 66 U/L — HIGH (ref 10–40)
BASE EXCESS BLDV CALC-SCNC: -2.1 MMOL/L — LOW (ref -2–2)
BASE EXCESS BLDV CALC-SCNC: -2.3 MMOL/L — LOW (ref -2–2)
BASE EXCESS BLDV CALC-SCNC: -2.7 MMOL/L — LOW (ref -2–2)
BASE EXCESS BLDV CALC-SCNC: -3.2 MMOL/L — LOW (ref -2–2)
BASE EXCESS BLDV CALC-SCNC: -3.6 MMOL/L — LOW (ref -2–2)
BASE EXCESS BLDV CALC-SCNC: -3.8 MMOL/L — LOW (ref -2–2)
BILIRUB SERPL-MCNC: 1.1 MG/DL — SIGNIFICANT CHANGE UP (ref 0.2–1.2)
BILIRUB SERPL-MCNC: 1.2 MG/DL — SIGNIFICANT CHANGE UP (ref 0.2–1.2)
BLD GP AB SCN SERPL QL: NEGATIVE — SIGNIFICANT CHANGE UP
BUN SERPL-MCNC: 31 MG/DL — HIGH (ref 7–23)
BUN SERPL-MCNC: 41 MG/DL — HIGH (ref 7–23)
CALCIUM SERPL-MCNC: 7.6 MG/DL — LOW (ref 8.4–10.5)
CALCIUM SERPL-MCNC: 8 MG/DL — LOW (ref 8.4–10.5)
CHLORIDE SERPL-SCNC: 105 MMOL/L — SIGNIFICANT CHANGE UP (ref 96–108)
CHLORIDE SERPL-SCNC: 109 MMOL/L — HIGH (ref 96–108)
CO2 BLDV-SCNC: 20 MMOL/L — LOW (ref 22–30)
CO2 BLDV-SCNC: 21 MMOL/L — LOW (ref 22–30)
CO2 BLDV-SCNC: 22 MMOL/L — SIGNIFICANT CHANGE UP (ref 22–30)
CO2 BLDV-SCNC: 23 MMOL/L — SIGNIFICANT CHANGE UP (ref 22–30)
CO2 SERPL-SCNC: 20 MMOL/L — LOW (ref 22–31)
CO2 SERPL-SCNC: 20 MMOL/L — LOW (ref 22–31)
CREAT SERPL-MCNC: 1.77 MG/DL — HIGH (ref 0.5–1.3)
CREAT SERPL-MCNC: 1.94 MG/DL — HIGH (ref 0.5–1.3)
GAS PNL BLDA: SIGNIFICANT CHANGE UP
GAS PNL BLDV: SIGNIFICANT CHANGE UP
GAS PNL BLDV: SIGNIFICANT CHANGE UP
GLUCOSE BLDC GLUCOMTR-MCNC: 103 MG/DL — HIGH (ref 70–99)
GLUCOSE BLDC GLUCOMTR-MCNC: 106 MG/DL — HIGH (ref 70–99)
GLUCOSE BLDC GLUCOMTR-MCNC: 135 MG/DL — HIGH (ref 70–99)
GLUCOSE BLDC GLUCOMTR-MCNC: 172 MG/DL — HIGH (ref 70–99)
GLUCOSE BLDC GLUCOMTR-MCNC: 181 MG/DL — HIGH (ref 70–99)
GLUCOSE BLDC GLUCOMTR-MCNC: 251 MG/DL — HIGH (ref 70–99)
GLUCOSE BLDC GLUCOMTR-MCNC: 261 MG/DL — HIGH (ref 70–99)
GLUCOSE BLDC GLUCOMTR-MCNC: 273 MG/DL — HIGH (ref 70–99)
GLUCOSE BLDC GLUCOMTR-MCNC: 282 MG/DL — HIGH (ref 70–99)
GLUCOSE BLDC GLUCOMTR-MCNC: 286 MG/DL — HIGH (ref 70–99)
GLUCOSE BLDC GLUCOMTR-MCNC: 295 MG/DL — HIGH (ref 70–99)
GLUCOSE BLDC GLUCOMTR-MCNC: 66 MG/DL — LOW (ref 70–99)
GLUCOSE BLDC GLUCOMTR-MCNC: 74 MG/DL — SIGNIFICANT CHANGE UP (ref 70–99)
GLUCOSE BLDC GLUCOMTR-MCNC: 77 MG/DL — SIGNIFICANT CHANGE UP (ref 70–99)
GLUCOSE BLDC GLUCOMTR-MCNC: 88 MG/DL — SIGNIFICANT CHANGE UP (ref 70–99)
GLUCOSE BLDC GLUCOMTR-MCNC: 89 MG/DL — SIGNIFICANT CHANGE UP (ref 70–99)
GLUCOSE SERPL-MCNC: 282 MG/DL — HIGH (ref 70–99)
GLUCOSE SERPL-MCNC: 80 MG/DL — SIGNIFICANT CHANGE UP (ref 70–99)
HCO3 BLDV-SCNC: 20 MMOL/L — LOW (ref 21–29)
HCO3 BLDV-SCNC: 21 MMOL/L — SIGNIFICANT CHANGE UP (ref 21–29)
HCO3 BLDV-SCNC: 21 MMOL/L — SIGNIFICANT CHANGE UP (ref 21–29)
HCO3 BLDV-SCNC: 22 MMOL/L — SIGNIFICANT CHANGE UP (ref 21–29)
HCT VFR BLD CALC: 27.4 % — LOW (ref 34.5–45)
HGB BLD-MCNC: 9.2 G/DL — LOW (ref 11.5–15.5)
HOROWITZ INDEX BLDV+IHG-RTO: 40 — SIGNIFICANT CHANGE UP
INR BLD: 1.31 RATIO — HIGH (ref 0.88–1.16)
MAGNESIUM SERPL-MCNC: 3.1 MG/DL — HIGH (ref 1.6–2.6)
MCHC RBC-ENTMCNC: 27.3 PG — SIGNIFICANT CHANGE UP (ref 27–34)
MCHC RBC-ENTMCNC: 33.6 GM/DL — SIGNIFICANT CHANGE UP (ref 32–36)
MCV RBC AUTO: 81.3 FL — SIGNIFICANT CHANGE UP (ref 80–100)
NRBC # BLD: 0 /100 WBCS — SIGNIFICANT CHANGE UP (ref 0–0)
PCO2 BLDV: 30 MMHG — LOW (ref 35–50)
PCO2 BLDV: 33 MMHG — LOW (ref 35–50)
PCO2 BLDV: 34 MMHG — LOW (ref 35–50)
PCO2 BLDV: 35 MMHG — SIGNIFICANT CHANGE UP (ref 35–50)
PH BLDV: 7.39 — SIGNIFICANT CHANGE UP (ref 7.35–7.45)
PH BLDV: 7.4 — SIGNIFICANT CHANGE UP (ref 7.35–7.45)
PH BLDV: 7.4 — SIGNIFICANT CHANGE UP (ref 7.35–7.45)
PH BLDV: 7.42 — SIGNIFICANT CHANGE UP (ref 7.35–7.45)
PH BLDV: 7.42 — SIGNIFICANT CHANGE UP (ref 7.35–7.45)
PH BLDV: 7.43 — SIGNIFICANT CHANGE UP (ref 7.35–7.45)
PHOSPHATE SERPL-MCNC: 3.4 MG/DL — SIGNIFICANT CHANGE UP (ref 2.5–4.5)
PLATELET # BLD AUTO: 133 K/UL — LOW (ref 150–400)
PO2 BLDV: 36 MMHG — SIGNIFICANT CHANGE UP (ref 25–45)
PO2 BLDV: 38 MMHG — SIGNIFICANT CHANGE UP (ref 25–45)
PO2 BLDV: 39 MMHG — SIGNIFICANT CHANGE UP (ref 25–45)
PO2 BLDV: 42 MMHG — SIGNIFICANT CHANGE UP (ref 25–45)
POTASSIUM SERPL-MCNC: 4.1 MMOL/L — SIGNIFICANT CHANGE UP (ref 3.5–5.3)
POTASSIUM SERPL-MCNC: 4.6 MMOL/L — SIGNIFICANT CHANGE UP (ref 3.5–5.3)
POTASSIUM SERPL-SCNC: 4.1 MMOL/L — SIGNIFICANT CHANGE UP (ref 3.5–5.3)
POTASSIUM SERPL-SCNC: 4.6 MMOL/L — SIGNIFICANT CHANGE UP (ref 3.5–5.3)
PROT SERPL-MCNC: 5 G/DL — LOW (ref 6–8.3)
PROT SERPL-MCNC: 5.3 G/DL — LOW (ref 6–8.3)
PROTHROM AB SERPL-ACNC: 15.2 SEC — HIGH (ref 10–12.9)
RBC # BLD: 3.37 M/UL — LOW (ref 3.8–5.2)
RBC # FLD: 16 % — HIGH (ref 10.3–14.5)
RH IG SCN BLD-IMP: POSITIVE — SIGNIFICANT CHANGE UP
SAO2 % BLDV: 64 % — LOW (ref 67–88)
SAO2 % BLDV: 65 % — LOW (ref 67–88)
SAO2 % BLDV: 66 % — LOW (ref 67–88)
SAO2 % BLDV: 67 % — SIGNIFICANT CHANGE UP (ref 67–88)
SAO2 % BLDV: 68 % — SIGNIFICANT CHANGE UP (ref 67–88)
SAO2 % BLDV: 72 % — SIGNIFICANT CHANGE UP (ref 67–88)
SODIUM SERPL-SCNC: 137 MMOL/L — SIGNIFICANT CHANGE UP (ref 135–145)
SODIUM SERPL-SCNC: 140 MMOL/L — SIGNIFICANT CHANGE UP (ref 135–145)
WBC # BLD: 17.27 K/UL — HIGH (ref 3.8–10.5)
WBC # FLD AUTO: 17.27 K/UL — HIGH (ref 3.8–10.5)

## 2019-10-28 PROCEDURE — 71045 X-RAY EXAM CHEST 1 VIEW: CPT | Mod: 26,59

## 2019-10-28 PROCEDURE — 99291 CRITICAL CARE FIRST HOUR: CPT

## 2019-10-28 PROCEDURE — 36556 INSERT NON-TUNNEL CV CATH: CPT | Mod: 78

## 2019-10-28 RX ORDER — INSULIN LISPRO 100/ML
VIAL (ML) SUBCUTANEOUS AT BEDTIME
Refills: 0 | Status: DISCONTINUED | OUTPATIENT
Start: 2019-10-28 | End: 2019-10-28

## 2019-10-28 RX ORDER — PANTOPRAZOLE SODIUM 20 MG/1
40 TABLET, DELAYED RELEASE ORAL
Refills: 0 | Status: DISCONTINUED | OUTPATIENT
Start: 2019-10-28 | End: 2019-11-08

## 2019-10-28 RX ORDER — INSULIN GLARGINE 100 [IU]/ML
10 INJECTION, SOLUTION SUBCUTANEOUS AT BEDTIME
Refills: 0 | Status: DISCONTINUED | OUTPATIENT
Start: 2019-10-28 | End: 2019-10-29

## 2019-10-28 RX ORDER — INSULIN LISPRO 100/ML
VIAL (ML) SUBCUTANEOUS
Refills: 0 | Status: DISCONTINUED | OUTPATIENT
Start: 2019-10-28 | End: 2019-10-28

## 2019-10-28 RX ORDER — FENTANYL CITRATE 50 UG/ML
25 INJECTION INTRAVENOUS ONCE
Refills: 0 | Status: DISCONTINUED | OUTPATIENT
Start: 2019-10-28 | End: 2019-10-28

## 2019-10-28 RX ORDER — HEPARIN SODIUM 5000 [USP'U]/ML
700 INJECTION INTRAVENOUS; SUBCUTANEOUS
Qty: 25000 | Refills: 0 | Status: DISCONTINUED | OUTPATIENT
Start: 2019-10-28 | End: 2019-10-29

## 2019-10-28 RX ORDER — AMIODARONE HYDROCHLORIDE 400 MG/1
200 TABLET ORAL DAILY
Refills: 0 | Status: DISCONTINUED | OUTPATIENT
Start: 2019-10-28 | End: 2019-11-01

## 2019-10-28 RX ORDER — ACETAMINOPHEN 500 MG
650 TABLET ORAL DAILY
Refills: 0 | Status: DISCONTINUED | OUTPATIENT
Start: 2019-10-28 | End: 2019-10-29

## 2019-10-28 RX ORDER — FUROSEMIDE 40 MG
20 TABLET ORAL ONCE
Refills: 0 | Status: COMPLETED | OUTPATIENT
Start: 2019-10-28 | End: 2019-10-28

## 2019-10-28 RX ORDER — WARFARIN SODIUM 2.5 MG/1
2 TABLET ORAL ONCE
Refills: 0 | Status: COMPLETED | OUTPATIENT
Start: 2019-10-28 | End: 2019-10-28

## 2019-10-28 RX ORDER — DEXTROSE 50 % IN WATER 50 %
50 SYRINGE (ML) INTRAVENOUS
Refills: 0 | Status: COMPLETED | OUTPATIENT
Start: 2019-10-28 | End: 2019-10-28

## 2019-10-28 RX ORDER — INSULIN LISPRO 100/ML
3 VIAL (ML) SUBCUTANEOUS
Refills: 0 | Status: DISCONTINUED | OUTPATIENT
Start: 2019-10-28 | End: 2019-10-28

## 2019-10-28 RX ORDER — FUROSEMIDE 40 MG
40 TABLET ORAL ONCE
Refills: 0 | Status: COMPLETED | OUTPATIENT
Start: 2019-10-28 | End: 2019-10-28

## 2019-10-28 RX ORDER — HEPARIN SODIUM 5000 [USP'U]/ML
5000 INJECTION INTRAVENOUS; SUBCUTANEOUS EVERY 8 HOURS
Refills: 0 | Status: DISCONTINUED | OUTPATIENT
Start: 2019-10-28 | End: 2019-10-28

## 2019-10-28 RX ADMIN — AMIODARONE HYDROCHLORIDE 200 MILLIGRAM(S): 400 TABLET ORAL at 10:03

## 2019-10-28 RX ADMIN — AMIODARONE HYDROCHLORIDE 16.67 MG/MIN: 400 TABLET ORAL at 01:08

## 2019-10-28 RX ADMIN — Medication 3.47 MICROGRAM(S)/KG/MIN: at 15:05

## 2019-10-28 RX ADMIN — ATORVASTATIN CALCIUM 80 MILLIGRAM(S): 80 TABLET, FILM COATED ORAL at 23:43

## 2019-10-28 RX ADMIN — HEPARIN SODIUM 7 UNIT(S)/HR: 5000 INJECTION INTRAVENOUS; SUBCUTANEOUS at 01:33

## 2019-10-28 RX ADMIN — WARFARIN SODIUM 2 MILLIGRAM(S): 2.5 TABLET ORAL at 23:43

## 2019-10-28 RX ADMIN — Medication 650 MILLIGRAM(S): at 19:20

## 2019-10-28 RX ADMIN — INSULIN GLARGINE 10 UNIT(S): 100 INJECTION, SOLUTION SUBCUTANEOUS at 23:43

## 2019-10-28 RX ADMIN — FENTANYL CITRATE 25 MICROGRAM(S): 50 INJECTION INTRAVENOUS at 05:30

## 2019-10-28 RX ADMIN — FENTANYL CITRATE 25 MICROGRAM(S): 50 INJECTION INTRAVENOUS at 05:15

## 2019-10-28 RX ADMIN — Medication 50 MILLILITER(S): at 02:31

## 2019-10-28 RX ADMIN — MILRINONE LACTATE 3.47 MICROGRAM(S)/KG/MIN: 1 INJECTION, SOLUTION INTRAVENOUS at 01:07

## 2019-10-28 RX ADMIN — FENTANYL CITRATE 25 MICROGRAM(S): 50 INJECTION INTRAVENOUS at 09:15

## 2019-10-28 RX ADMIN — CHLORHEXIDINE GLUCONATE 1 APPLICATION(S): 213 SOLUTION TOPICAL at 05:59

## 2019-10-28 RX ADMIN — Medication 20 MILLIGRAM(S): at 15:04

## 2019-10-28 RX ADMIN — Medication 40 MILLIGRAM(S): at 07:53

## 2019-10-28 RX ADMIN — PANTOPRAZOLE SODIUM 40 MILLIGRAM(S): 20 TABLET, DELAYED RELEASE ORAL at 10:33

## 2019-10-28 RX ADMIN — MILRINONE LACTATE 3.47 MICROGRAM(S)/KG/MIN: 1 INJECTION, SOLUTION INTRAVENOUS at 15:04

## 2019-10-28 RX ADMIN — Medication 1.74 MICROGRAM(S)/KG/MIN: at 01:09

## 2019-10-28 RX ADMIN — Medication 650 MILLIGRAM(S): at 18:09

## 2019-10-28 RX ADMIN — Medication 81 MILLIGRAM(S): at 12:16

## 2019-10-28 RX ADMIN — INSULIN HUMAN 3 UNIT(S)/HR: 100 INJECTION, SOLUTION SUBCUTANEOUS at 01:07

## 2019-10-28 RX ADMIN — FENTANYL CITRATE 25 MICROGRAM(S): 50 INJECTION INTRAVENOUS at 08:54

## 2019-10-28 NOTE — DIETITIAN INITIAL EVALUATION ADULT. - PROBLEM SELECTOR PLAN 7
Diet: DASH/TLD with consistent carb  DVT Prophylaxis: Heparin gtt  Dispo: PT consulted    Leann Gordon  PGY-2 Internal Medicine  Pager: [phone] (NS)/[phone] ([facility]OSES)

## 2019-10-28 NOTE — DIETITIAN INITIAL EVALUATION ADULT. - ENERGY NEEDS
Ht: 5'4", admission Wt: 127.6lbs, BMI: 21.0kg/m2, IBW: 120lbs(+/-10%), 107%%IBW  Pertinent information:  63 year olf female with PMHx of DM, HTN, CVA. Admitted with SOB and abdominal pain.  LHC with multivessel disease, required IABP. Pt now POD 2 from CABG x4, MVR, JARED clipping. Extubated and IABP removed.   +2 generalized and +3 right hand/arm Edema. Skin: intact

## 2019-10-28 NOTE — DIETITIAN INITIAL EVALUATION ADULT. - PROBLEM SELECTOR PLAN 1
Pt with ST segment changes on V2-V5, troponins 1340, BNP 5799,   - Cardiology consulted. Appreciate recs  - S/p ASA, brillinita, and heparin load  - C/w ASA 81 mg q Day  - C/w Brilinita  - C/w Heparin gtt  - Monitor on telemetry  - Trend cardiac enzymes until downtrending  - Echo  - Maintain euvolemia by dosing lasix daily  - Patient will likely need a beta blocker given the NSTEMI. However, given the elevated BNP and pleural effusions on CXR, would hold off on initiating beta blocker at this time (concern for decompensated HF)

## 2019-10-28 NOTE — DIETITIAN INITIAL EVALUATION ADULT. - PROBLEM SELECTOR PLAN 4
Patient on Lisinopril and Amlodpine per outpatient medication review  - Restarted Lisinopril while patient in hospital.

## 2019-10-28 NOTE — DIETITIAN INITIAL EVALUATION ADULT. - PHYSICAL APPEARANCE
Call and spoke with care manager of [address] [place] [address] Cynthia Alvarado. HIPAA verified. They had drawn labs today. Requested a stat redraw of both CBC and CMP. Verbalized understanding.
Faxed order for stat labs to [phone]. Confirmation rec'd.
Labs rechecked, K 4.5.  Will have them scanned
Need to repeat labs today. CBC clotted, wonder if issue with results of metabolic panel as well.  Please see if patient can come to infusion today for peripheral labs or repeat stat through [facility].
Received call from Ashley Pinto. Critical lab result received:   Potassium of 8 , in contact with cells. Received and read back to confirm. Forwarded to provider for review.
well nourished

## 2019-10-28 NOTE — PROGRESS NOTE ADULT - SUBJECTIVE AND OBJECTIVE BOX
CRITICAL CARE ATTENDING - CTICU    MEDICATIONS  (STANDING):  aMIOdarone Infusion 0.5 mG/Min (16.667 mL/Hr) IV Continuous <Continuous>  aspirin  chewable 81 milliGRAM(s) Oral daily  atorvastatin 80 milliGRAM(s) Oral at bedtime  chlorhexidine 2% Cloths 1 Application(s) Topical <User Schedule>  dextrose 50% Injectable 50 milliLiter(s) IV Push every 15 minutes  dextrose 50% Injectable 25 milliLiter(s) IV Push every 15 minutes  DOBUTamine Infusion 1 MICROgram(s)/kG/Min (1.737 mL/Hr) IV Continuous <Continuous>  heparin  Infusion 500 Unit(s)/Hr (7 mL/Hr) IV Continuous <Continuous>  insulin regular Infusion 3 Unit(s)/Hr (3 mL/Hr) IV Continuous <Continuous>  milrinone Infusion 0.2 MICROgram(s)/kG/Min (3.474 mL/Hr) IV Continuous <Continuous>  pantoprazole  Injectable 40 milliGRAM(s) IV Push daily  polyethylene glycol 3350 17 Gram(s) Oral daily  senna 2 Tablet(s) Oral at bedtime  sodium chloride 0.9%. 1000 milliLiter(s) (10 mL/Hr) IV Continuous <Continuous>                                    9.2    17.27 )-----------( 133      ( 28 Oct 2019 00:54 )             27.4       10-28    140  |  109<H>  |  31<H>  ----------------------------<  80  4.1   |  20<L>  |  1.94<H>    Ca    8.0<L>      28 Oct 2019 00:54  Phos  3.4     10-28  Mg     3.1     10-28    TPro  5.3<L>  /  Alb  3.2<L>  /  TBili  1.1  /  DBili  x   /  AST  41<H>  /  ALT  41  /  AlkPhos  55  10-28      PT/INR - ( 27 Oct 2019 16:23 )   PT: 16.1 sec;   INR: 1.40 ratio         PTT - ( 27 Oct 2019 22:04 )  PTT:79.4 sec        Daily     Daily Weight in k.4 (28 Oct 2019 00:00)      10-26 @ 07:01  -  10-27 @ 07:00  --------------------------------------------------------  IN: 2645.2 mL / OUT: 1792 mL / NET: 853.2 mL    10-27 @ 07:01  -  10-28 @ 06:44  --------------------------------------------------------  IN: 2086.8 mL / OUT: 1825 mL / NET: 261.8 mL        Critically Ill patient  : [ ] preoperative ,   [x ] post operative    Requires :  [x ] Arterial Line   [ x] Central Line  [x ] PA catheter  [ ] IABP  [ ] ECMO  [ ] LVAD  [ ] Ventilator  [ ] pacemaker [ ] Impella.                      [x ] ABG's     [ x] Pulse Oxymetry Monitoring  Bedside evaluation , monitoring , treatment of hemodynamics , fluids , IVP/ IVCD meds.        Diagnosis:     POD 2 - C4B / MVR    Admitted - NSTEMI    Cardiogenic Shock  - resolving    CHF- acute [ x]   chronic [ ]    systolic [x ]   diatolic [ ]          - Echo- EF -  35%           [ ] RV dysfunction          - Cxr-cardiomegally, edema          - Clinical-  [x ]inotropes   [x ]pressors   [ x]diuresis   [ x]IABP   [ ]ECMO   [ ]LVAD   [ ]Respiratory Failure          -     Hemodynamic lability, instability. Requires IVCD [x] vasopressors [x ] inotropes  [ ] vasodilator  [x ]IVSS fluid  to maintain MAP, perfusion, C.I.     DM    Insulin drip    Temporary pacemaker (TPM) interrogation and setting.     Requires  [ ]DDD  [x ]VVI    [ ]AII  temporary pacing at 90      to maintain HR, MAP, CI, and perfusion.     V Tach.    Hypotension    IABP   [ x] management   [ ] wean 1:1 1:2 1:3   [ ] removal and f/u vascular checks     Requires chest PT, pulmonary toilet, , suctioning to maintain SaO2,  patent airway and treat atelectasis.     Ventilator Management:  [ x]AC-rest    [ x]CPAP-PS Wean    [ ]Trach Collar     [x ]Extubate    [ ] T-Piece  [ ]peep>5     respiratory failure - BIPAP    [place] Bartolome catheter interpretation and therapeutic management of unstable hemodynamics     Thrombocytopenia      Renal Failure - Acute Kidney Injury                   Discussed with CT surgeon, Physician's Assistant - Nurse Practitioner- Critical care medicine team.   Discussed at  AM / PM rounds.   Chart, labs , films reviewed.    Total Time: CRITICAL CARE ATTENDING - CTICU    MEDICATIONS  (STANDING):  aMIOdarone Infusion 0.5 mG/Min (16.667 mL/Hr) IV Continuous <Continuous>  aspirin  chewable 81 milliGRAM(s) Oral daily  atorvastatin 80 milliGRAM(s) Oral at bedtime  chlorhexidine 2% Cloths 1 Application(s) Topical <User Schedule>  dextrose 50% Injectable 50 milliLiter(s) IV Push every 15 minutes  dextrose 50% Injectable 25 milliLiter(s) IV Push every 15 minutes  DOBUTamine Infusion 1 MICROgram(s)/kG/Min (1.737 mL/Hr) IV Continuous <Continuous>  heparin  Infusion 500 Unit(s)/Hr (7 mL/Hr) IV Continuous <Continuous>  insulin regular Infusion 3 Unit(s)/Hr (3 mL/Hr) IV Continuous <Continuous>  milrinone Infusion 0.2 MICROgram(s)/kG/Min (3.474 mL/Hr) IV Continuous <Continuous>  pantoprazole  Injectable 40 milliGRAM(s) IV Push daily  polyethylene glycol 3350 17 Gram(s) Oral daily  senna 2 Tablet(s) Oral at bedtime  sodium chloride 0.9%. 1000 milliLiter(s) (10 mL/Hr) IV Continuous <Continuous>                                    9.2    17.27 )-----------( 133      ( 28 Oct 2019 00:54 )             27.4       10-28    140  |  109<H>  |  31<H>  ----------------------------<  80  4.1   |  20<L>  |  1.94<H>    Ca    8.0<L>      28 Oct 2019 00:54  Phos  3.4     10-28  Mg     3.1     10-28    TPro  5.3<L>  /  Alb  3.2<L>  /  TBili  1.1  /  DBili  x   /  AST  41<H>  /  ALT  41  /  AlkPhos  55  10-28      PT/INR - ( 27 Oct 2019 16:23 )   PT: 16.1 sec;   INR: 1.40 ratio         PTT - ( 27 Oct 2019 22:04 )  PTT:79.4 sec        Daily     Daily Weight in k.4 (28 Oct 2019 00:00)      10-26 @ 07:01  -  10-27 @ 07:00  --------------------------------------------------------  IN: 2645.2 mL / OUT: 1792 mL / NET: 853.2 mL    10-27 @ 07:01  -  10-28 @ 06:44  --------------------------------------------------------  IN: 2086.8 mL / OUT: 1825 mL / NET: 261.8 mL        Critically Ill patient  : [ ] preoperative ,   [x ] post operative    Requires :  [x ] Arterial Line   [ x] Central Line  [ ] PA catheter  [ ] IABP  [ ] ECMO  [ ] LVAD  [ ] Ventilator  [ ] pacemaker [ ] Impella.                      [x ] ABG's     [ x] Pulse Oxymetry Monitoring  Bedside evaluation , monitoring , treatment of hemodynamics , fluids , IVP/ IVCD meds.        Diagnosis:     POD 3 - C4B / MVR    Admitted - NSTEMI    Cardiogenic Shock  - resolving    CHF- acute [ x]   chronic [ ]    systolic [x ]   diatolic [ ]          - Echo- EF -  35%           [ ] RV dysfunction          - Cxr-cardiomegally, edema          - Clinical-  [x ]inotropes   [x ]pressors   [ x]diuresis   [ x]IABP   [ ]ECMO   [ ]LVAD   [ ]Respiratory Failure          -     Hemodynamic lability, instability. Requires IVCD [x] vasopressors [x ] inotropes  [ ] vasodilator  [x ]IVSS fluid  to maintain MAP, perfusion, C.I.     DM    Insulin drip    Temporary pacemaker (TPM) interrogation and setting.     Requires  [ ]DDD  [x ]VVI    [ ]AII  temporary pacing at 90      to maintain HR, MAP, CI, and perfusion.     V Tach.    IABP   [ x] management   [ ] wean 1:1 1:2 1:3   [ ] removal and f/u vascular checks     Chest tube drainage    Requires chest PT, pulmonary toilet, , suctioning to maintain SaO2,  patent airway and treat atelectasis.     Thrombocytopenia      Renal Failure - Acute Kidney Injury     hypotension    Right lower extremity pain/numbness    Absent dorsalis pedia pulse              Discussed with CT surgeon, Physician's Assistant - Nurse Practitioner- Critical care medicine team.   Discussed at  AM / PM rounds.   Chart, labs , films reviewed.    Total Time: CRITICAL CARE ATTENDING - CTICU    MEDICATIONS  (STANDING):  aMIOdarone Infusion 0.5 mG/Min (16.667 mL/Hr) IV Continuous <Continuous>  aspirin  chewable 81 milliGRAM(s) Oral daily  atorvastatin 80 milliGRAM(s) Oral at bedtime  chlorhexidine 2% Cloths 1 Application(s) Topical <User Schedule>  dextrose 50% Injectable 50 milliLiter(s) IV Push every 15 minutes  dextrose 50% Injectable 25 milliLiter(s) IV Push every 15 minutes  DOBUTamine Infusion 1 MICROgram(s)/kG/Min (1.737 mL/Hr) IV Continuous <Continuous>  heparin  Infusion 500 Unit(s)/Hr (7 mL/Hr) IV Continuous <Continuous>  insulin regular Infusion 3 Unit(s)/Hr (3 mL/Hr) IV Continuous <Continuous>  milrinone Infusion 0.2 MICROgram(s)/kG/Min (3.474 mL/Hr) IV Continuous <Continuous>  pantoprazole  Injectable 40 milliGRAM(s) IV Push daily  polyethylene glycol 3350 17 Gram(s) Oral daily  senna 2 Tablet(s) Oral at bedtime  sodium chloride 0.9%. 1000 milliLiter(s) (10 mL/Hr) IV Continuous <Continuous>                                    9.2    17.27 )-----------( 133      ( 28 Oct 2019 00:54 )             27.4       10-28    140  |  109<H>  |  31<H>  ----------------------------<  80  4.1   |  20<L>  |  1.94<H>    Ca    8.0<L>      28 Oct 2019 00:54  Phos  3.4     10-28  Mg     3.1     10-28    TPro  5.3<L>  /  Alb  3.2<L>  /  TBili  1.1  /  DBili  x   /  AST  41<H>  /  ALT  41  /  AlkPhos  55  10-28      PT/INR - ( 27 Oct 2019 16:23 )   PT: 16.1 sec;   INR: 1.40 ratio         PTT - ( 27 Oct 2019 22:04 )  PTT:79.4 sec        Daily     Daily Weight in k.4 (28 Oct 2019 00:00)      10-26 @ 07:01  -  10-27 @ 07:00  --------------------------------------------------------  IN: 2645.2 mL / OUT: 1792 mL / NET: 853.2 mL    10-27 @ 07:01  -  10-28 @ 06:44  --------------------------------------------------------  IN: 2086.8 mL / OUT: 1825 mL / NET: 261.8 mL        Critically Ill patient  : [ ] preoperative ,   [x ] post operative    Requires :  [x ] Arterial Line   [ x] Central Line  [ ] PA catheter  [ ] IABP  [ ] ECMO  [ ] LVAD  [ ] Ventilator  [ ] pacemaker [ ] Impella.                      [x ] ABG's     [ x] Pulse Oxymetry Monitoring  Bedside evaluation , monitoring , treatment of hemodynamics , fluids , IVP/ IVCD meds.        Diagnosis:     POD 3 - C4B / MVR    Admitted - NSTEMI    Cardiogenic Shock  - resolving    CHF- acute [ x]   chronic [ ]    systolic [x ]   diatolic [ ]          - Echo- EF -  35%           [ ] RV dysfunction          - Cxr-cardiomegally, edema          - Clinical-  [x ]inotropes   [x ]pressors   [ x]diuresis   [ x]IABP   [ ]ECMO   [ ]LVAD   [ ]Respiratory Failure          -     Hemodynamic lability, instability. Requires IVCD [x] vasopressors [x ] inotropes  [ ] vasodilator  [ ]IVSS fluid  to maintain MAP, perfusion, C.I.     DM    Insulin drip    Temporary pacemaker (TPM) interrogation and setting.      V Tach.    IABP   [ x] management   [x ] wean 1:1 1:2 1:3   [ x] removal and f/u vascular checks     Chest tube drainage    Requires chest PT, pulmonary toilet, , suctioning to maintain SaO2,  patent airway and treat atelectasis.     Thrombocytopenia      Renal Failure - Acute Kidney Injury     hypotension    Right lower extremity pain/numbness    Absent dorsalis pedia pulse    IVCD anticoagulation with [ x] Heparin  [ ] Argatroban for R Foot               Discussed with CT surgeon, Physician's Assistant - Nurse Practitioner- Critical care medicine team.   Discussed at  AM / PM rounds.   Chart, labs , films reviewed.    Total Time: 30 min

## 2019-10-28 NOTE — DIETITIAN INITIAL EVALUATION ADULT. - OTHER INFO
Pt seen for LOS. Pt seen in bed, c/o right foot pain. RN is aware.   Pt with limited interest in RD interview, is distracted by foot Pain. Pt was amenable to brief interview.  States a good PO intake PTA and denies following any therapeutic diet restriction. Pt denies any recent weight changes but cannot recall her UBW. Pt was admitted at 127lbs and is currently 155lbs. Increase likely in the setting of intraoperative fluid shifts. Pt denies GI distress, chewing/swallowing difficulty, micronutrient supplements. NKFA.  Pt denies being hungry for breakfast at this time but is amenable to a Glucerna. Pt briefly educated on increased nutrient needs for post op wound healing. Pt aware RD remains available

## 2019-10-28 NOTE — DIETITIAN INITIAL EVALUATION ADULT. - ADD RECOMMEND
1. Provide food preferences as requested by Pt/family within diet restrictions  2. Encourage PO intake during meal times 3. Reviewed menu ordering procedures 4. continue Glucerna x3 to supplement intake. WIll follow up to collect additional subjective information

## 2019-10-28 NOTE — PROGRESS NOTE ADULT - PROBLEM SELECTOR PLAN 1
Suggest to continue insulin drip for now; Will continue monitoring FS and FU.  Once patient is eating meals, will switch to basal bolus insulin.

## 2019-10-28 NOTE — DIETITIAN INITIAL EVALUATION ADULT. - PROBLEM SELECTOR PLAN 6
Patient and family unclear of medications. Med rec done from outpatient medication reconcile. Primary team to reach our to pharmacy and/or family for additional info.

## 2019-10-28 NOTE — PROGRESS NOTE ADULT - ASSESSMENT
Assessment  DMT2: 63y Female with DM T2 with hyperglycemia, was on oral meds, now S/P CABG on insulin drip, had hypoglycemic episode, FS now improving, patient is not yet eating meals, appears comfortable.  CAD: S/P CABG 10/25, on medications, no chest pain, stable, monitored.  HTN: Controlled,  on antihypertensive medications.  HLD: On statin, compliant with  intake.          Mireya Niño MD  Cell: [phone]  Office: [phone]

## 2019-10-28 NOTE — DIETITIAN INITIAL EVALUATION ADULT. - PROBLEM SELECTOR PLAN 5
Patient with hx of CVA (residual deficits: patient requiring a cane to ambulate)  - ASA  - Atorvastatin 80 mg qhs

## 2019-10-29 LAB
ALBUMIN SERPL ELPH-MCNC: 2.8 G/DL — LOW (ref 3.3–5)
ALP SERPL-CCNC: 87 U/L — SIGNIFICANT CHANGE UP (ref 40–120)
ALT FLD-CCNC: 58 U/L — HIGH (ref 10–45)
ANION GAP SERPL CALC-SCNC: 10 MMOL/L — SIGNIFICANT CHANGE UP (ref 5–17)
APTT BLD: 52.8 SEC — HIGH (ref 27.5–36.3)
APTT BLD: 53.3 SEC — HIGH (ref 27.5–36.3)
APTT BLD: 62.7 SEC — HIGH (ref 27.5–36.3)
APTT BLD: 66.9 SEC — HIGH (ref 27.5–36.3)
AST SERPL-CCNC: 44 U/L — HIGH (ref 10–40)
BASE EXCESS BLDMV CALC-SCNC: 0.1 MMOL/L — SIGNIFICANT CHANGE UP (ref -3–3)
BASE EXCESS BLDV CALC-SCNC: -1.1 MMOL/L — SIGNIFICANT CHANGE UP (ref -2–2)
BASE EXCESS BLDV CALC-SCNC: 0.2 MMOL/L — SIGNIFICANT CHANGE UP (ref -2–2)
BILIRUB SERPL-MCNC: 0.8 MG/DL — SIGNIFICANT CHANGE UP (ref 0.2–1.2)
BUN SERPL-MCNC: 40 MG/DL — HIGH (ref 7–23)
CA-I SERPL-SCNC: 1.07 MMOL/L — LOW (ref 1.12–1.3)
CALCIUM SERPL-MCNC: 7.5 MG/DL — LOW (ref 8.4–10.5)
CHLORIDE BLDV-SCNC: 108 MMOL/L — SIGNIFICANT CHANGE UP (ref 96–108)
CHLORIDE SERPL-SCNC: 108 MMOL/L — SIGNIFICANT CHANGE UP (ref 96–108)
CO2 BLDMV-SCNC: 24 MMOL/L — SIGNIFICANT CHANGE UP (ref 21–29)
CO2 BLDV-SCNC: 23 MMOL/L — SIGNIFICANT CHANGE UP (ref 22–30)
CO2 BLDV-SCNC: 24 MMOL/L — SIGNIFICANT CHANGE UP (ref 22–30)
CO2 SERPL-SCNC: 21 MMOL/L — LOW (ref 22–31)
CREAT SERPL-MCNC: 1.63 MG/DL — HIGH (ref 0.5–1.3)
GAS PNL BLDMV: SIGNIFICANT CHANGE UP
GAS PNL BLDV: 134 MMOL/L — LOW (ref 135–145)
GAS PNL BLDV: SIGNIFICANT CHANGE UP
GLUCOSE BLDC GLUCOMTR-MCNC: 108 MG/DL — HIGH (ref 70–99)
GLUCOSE BLDC GLUCOMTR-MCNC: 109 MG/DL — HIGH (ref 70–99)
GLUCOSE BLDC GLUCOMTR-MCNC: 119 MG/DL — HIGH (ref 70–99)
GLUCOSE BLDC GLUCOMTR-MCNC: 125 MG/DL — HIGH (ref 70–99)
GLUCOSE BLDC GLUCOMTR-MCNC: 156 MG/DL — HIGH (ref 70–99)
GLUCOSE BLDC GLUCOMTR-MCNC: 158 MG/DL — HIGH (ref 70–99)
GLUCOSE BLDC GLUCOMTR-MCNC: 167 MG/DL — HIGH (ref 70–99)
GLUCOSE BLDC GLUCOMTR-MCNC: 195 MG/DL — HIGH (ref 70–99)
GLUCOSE BLDC GLUCOMTR-MCNC: 212 MG/DL — HIGH (ref 70–99)
GLUCOSE BLDC GLUCOMTR-MCNC: 221 MG/DL — HIGH (ref 70–99)
GLUCOSE BLDC GLUCOMTR-MCNC: 227 MG/DL — HIGH (ref 70–99)
GLUCOSE BLDC GLUCOMTR-MCNC: 247 MG/DL — HIGH (ref 70–99)
GLUCOSE BLDC GLUCOMTR-MCNC: 262 MG/DL — HIGH (ref 70–99)
GLUCOSE BLDC GLUCOMTR-MCNC: 277 MG/DL — HIGH (ref 70–99)
GLUCOSE BLDC GLUCOMTR-MCNC: 88 MG/DL — SIGNIFICANT CHANGE UP (ref 70–99)
GLUCOSE BLDC GLUCOMTR-MCNC: 95 MG/DL — SIGNIFICANT CHANGE UP (ref 70–99)
GLUCOSE BLDC GLUCOMTR-MCNC: 99 MG/DL — SIGNIFICANT CHANGE UP (ref 70–99)
GLUCOSE BLDV-MCNC: 165 MG/DL — HIGH (ref 70–99)
GLUCOSE SERPL-MCNC: 131 MG/DL — HIGH (ref 70–99)
HCO3 BLDMV-SCNC: 23 MMOL/L — SIGNIFICANT CHANGE UP (ref 20–28)
HCO3 BLDV-SCNC: 22 MMOL/L — SIGNIFICANT CHANGE UP (ref 21–29)
HCO3 BLDV-SCNC: 23 MMOL/L — SIGNIFICANT CHANGE UP (ref 21–29)
HCT VFR BLD CALC: 28.9 % — LOW (ref 34.5–45)
HCT VFR BLDA CALC: 32 % — LOW (ref 39–50)
HGB BLD CALC-MCNC: 10.3 G/DL — LOW (ref 11.5–15.5)
HGB BLD-MCNC: 9.7 G/DL — LOW (ref 11.5–15.5)
HOROWITZ INDEX BLDMV+IHG-RTO: 40 — SIGNIFICANT CHANGE UP
HOROWITZ INDEX BLDV+IHG-RTO: 32 — SIGNIFICANT CHANGE UP
INR BLD: 1.23 RATIO — HIGH (ref 0.88–1.16)
INR BLD: 1.3 RATIO — HIGH (ref 0.88–1.16)
LACTATE BLDV-MCNC: 1.2 MMOL/L — SIGNIFICANT CHANGE UP (ref 0.7–2)
LACTATE BLDV-MCNC: 1.4 MMOL/L — SIGNIFICANT CHANGE UP (ref 0.7–2)
LIDOCAIN SERPL-MCNC: 14.9 NMOL/L — SIGNIFICANT CHANGE UP
MAGNESIUM SERPL-MCNC: 2.4 MG/DL — SIGNIFICANT CHANGE UP (ref 1.6–2.6)
MCHC RBC-ENTMCNC: 27.3 PG — SIGNIFICANT CHANGE UP (ref 27–34)
MCHC RBC-ENTMCNC: 33.6 GM/DL — SIGNIFICANT CHANGE UP (ref 32–36)
MCV RBC AUTO: 81.4 FL — SIGNIFICANT CHANGE UP (ref 80–100)
NRBC # BLD: 0 /100 WBCS — SIGNIFICANT CHANGE UP (ref 0–0)
O2 CT VFR BLD CALC: 33 MMHG — SIGNIFICANT CHANGE UP (ref 30–65)
OTHER CELLS CSF MANUAL: 9 ML/DL — LOW (ref 18–22)
PCO2 BLDMV: 33 MMHG — SIGNIFICANT CHANGE UP (ref 30–65)
PCO2 BLDV: 33 MMHG — LOW (ref 35–50)
PCO2 BLDV: 33 MMHG — LOW (ref 35–50)
PH BLDMV: 7.46 — HIGH (ref 7.32–7.45)
PH BLDV: 7.44 — SIGNIFICANT CHANGE UP (ref 7.35–7.45)
PH BLDV: 7.46 — HIGH (ref 7.35–7.45)
PHOSPHATE SERPL-MCNC: 2.5 MG/DL — SIGNIFICANT CHANGE UP (ref 2.5–4.5)
PLATELET # BLD AUTO: 122 K/UL — LOW (ref 150–400)
PO2 BLDV: 34 MMHG — SIGNIFICANT CHANGE UP (ref 25–45)
PO2 BLDV: 35 MMHG — SIGNIFICANT CHANGE UP (ref 25–45)
POTASSIUM BLDV-SCNC: 3.8 MMOL/L — SIGNIFICANT CHANGE UP (ref 3.5–5.3)
POTASSIUM SERPL-MCNC: 4 MMOL/L — SIGNIFICANT CHANGE UP (ref 3.5–5.3)
POTASSIUM SERPL-SCNC: 4 MMOL/L — SIGNIFICANT CHANGE UP (ref 3.5–5.3)
PROT SERPL-MCNC: 5.2 G/DL — LOW (ref 6–8.3)
PROTHROM AB SERPL-ACNC: 14.1 SEC — HIGH (ref 10–12.9)
PROTHROM AB SERPL-ACNC: 15.1 SEC — HIGH (ref 10–12.9)
RBC # BLD: 3.55 M/UL — LOW (ref 3.8–5.2)
RBC # FLD: 15.6 % — HIGH (ref 10.3–14.5)
SAO2 % BLDMV: 62 % — SIGNIFICANT CHANGE UP (ref 60–90)
SAO2 % BLDV: 62 % — LOW (ref 67–88)
SAO2 % BLDV: 65 % — LOW (ref 67–88)
SODIUM SERPL-SCNC: 139 MMOL/L — SIGNIFICANT CHANGE UP (ref 135–145)
WBC # BLD: 15.18 K/UL — HIGH (ref 3.8–10.5)
WBC # FLD AUTO: 15.18 K/UL — HIGH (ref 3.8–10.5)

## 2019-10-29 PROCEDURE — 99231 SBSQ HOSP IP/OBS SF/LOW 25: CPT

## 2019-10-29 PROCEDURE — 99291 CRITICAL CARE FIRST HOUR: CPT

## 2019-10-29 PROCEDURE — 71045 X-RAY EXAM CHEST 1 VIEW: CPT | Mod: 26

## 2019-10-29 PROCEDURE — 75635 CT ANGIO ABDOMINAL ARTERIES: CPT | Mod: 26

## 2019-10-29 PROCEDURE — 36569 INSJ PICC 5 YR+ W/O IMAGING: CPT | Mod: 58

## 2019-10-29 RX ORDER — SPIRONOLACTONE 25 MG/1
25 TABLET, FILM COATED ORAL DAILY
Refills: 0 | Status: DISCONTINUED | OUTPATIENT
Start: 2019-10-29 | End: 2019-11-08

## 2019-10-29 RX ORDER — HEPARIN SODIUM 5000 [USP'U]/ML
5000 INJECTION INTRAVENOUS; SUBCUTANEOUS EVERY 8 HOURS
Refills: 0 | Status: DISCONTINUED | OUTPATIENT
Start: 2019-10-29 | End: 2019-10-29

## 2019-10-29 RX ORDER — INSULIN HUMAN 100 [IU]/ML
3 INJECTION, SOLUTION SUBCUTANEOUS
Qty: 100 | Refills: 0 | Status: DISCONTINUED | OUTPATIENT
Start: 2019-10-29 | End: 2019-10-30

## 2019-10-29 RX ORDER — INSULIN LISPRO 100/ML
VIAL (ML) SUBCUTANEOUS
Refills: 0 | Status: DISCONTINUED | OUTPATIENT
Start: 2019-10-29 | End: 2019-10-29

## 2019-10-29 RX ORDER — INSULIN GLARGINE 100 [IU]/ML
15 INJECTION, SOLUTION SUBCUTANEOUS AT BEDTIME
Refills: 0 | Status: DISCONTINUED | OUTPATIENT
Start: 2019-10-29 | End: 2019-10-30

## 2019-10-29 RX ORDER — OXYCODONE AND ACETAMINOPHEN 5; 325 MG/1; MG/1
1 TABLET ORAL ONCE
Refills: 0 | Status: DISCONTINUED | OUTPATIENT
Start: 2019-10-29 | End: 2019-10-29

## 2019-10-29 RX ORDER — DEXMEDETOMIDINE HYDROCHLORIDE IN 0.9% SODIUM CHLORIDE 4 UG/ML
0.2 INJECTION INTRAVENOUS
Qty: 200 | Refills: 0 | Status: DISCONTINUED | OUTPATIENT
Start: 2019-10-29 | End: 2019-10-30

## 2019-10-29 RX ORDER — FUROSEMIDE 40 MG
40 TABLET ORAL DAILY
Refills: 0 | Status: DISCONTINUED | OUTPATIENT
Start: 2019-10-29 | End: 2019-11-08

## 2019-10-29 RX ORDER — INSULIN LISPRO 100/ML
VIAL (ML) SUBCUTANEOUS AT BEDTIME
Refills: 0 | Status: DISCONTINUED | OUTPATIENT
Start: 2019-10-29 | End: 2019-10-29

## 2019-10-29 RX ORDER — SODIUM CHLORIDE 9 MG/ML
1000 INJECTION INTRAMUSCULAR; INTRAVENOUS; SUBCUTANEOUS
Refills: 0 | Status: DISCONTINUED | OUTPATIENT
Start: 2019-10-29 | End: 2019-10-30

## 2019-10-29 RX ORDER — ACETYLCYSTEINE 200 MG/ML
1200 VIAL (ML) MISCELLANEOUS ONCE
Refills: 0 | Status: COMPLETED | OUTPATIENT
Start: 2019-10-29 | End: 2019-10-29

## 2019-10-29 RX ORDER — POTASSIUM CHLORIDE 20 MEQ
20 PACKET (EA) ORAL
Refills: 0 | Status: COMPLETED | OUTPATIENT
Start: 2019-10-29 | End: 2019-10-29

## 2019-10-29 RX ORDER — HEPARIN SODIUM 5000 [USP'U]/ML
800 INJECTION INTRAVENOUS; SUBCUTANEOUS
Qty: 25000 | Refills: 0 | Status: DISCONTINUED | OUTPATIENT
Start: 2019-10-29 | End: 2019-10-31

## 2019-10-29 RX ORDER — WARFARIN SODIUM 2.5 MG/1
2 TABLET ORAL ONCE
Refills: 0 | Status: COMPLETED | OUTPATIENT
Start: 2019-10-29 | End: 2019-10-29

## 2019-10-29 RX ADMIN — SODIUM CHLORIDE 50 MILLILITER(S): 9 INJECTION INTRAMUSCULAR; INTRAVENOUS; SUBCUTANEOUS at 17:14

## 2019-10-29 RX ADMIN — OXYCODONE AND ACETAMINOPHEN 1 TABLET(S): 5; 325 TABLET ORAL at 01:20

## 2019-10-29 RX ADMIN — OXYCODONE AND ACETAMINOPHEN 1 TABLET(S): 5; 325 TABLET ORAL at 00:50

## 2019-10-29 RX ADMIN — Medication 20 MILLIEQUIVALENT(S): at 23:13

## 2019-10-29 RX ADMIN — SODIUM CHLORIDE 75 MILLILITER(S): 9 INJECTION INTRAMUSCULAR; INTRAVENOUS; SUBCUTANEOUS at 16:16

## 2019-10-29 RX ADMIN — Medication 40 MILLIGRAM(S): at 08:45

## 2019-10-29 RX ADMIN — Medication 81 MILLIGRAM(S): at 11:11

## 2019-10-29 RX ADMIN — SPIRONOLACTONE 25 MILLIGRAM(S): 25 TABLET, FILM COATED ORAL at 08:45

## 2019-10-29 RX ADMIN — WARFARIN SODIUM 2 MILLIGRAM(S): 2.5 TABLET ORAL at 22:36

## 2019-10-29 RX ADMIN — AMIODARONE HYDROCHLORIDE 200 MILLIGRAM(S): 400 TABLET ORAL at 06:23

## 2019-10-29 RX ADMIN — Medication 20 MILLIEQUIVALENT(S): at 23:12

## 2019-10-29 RX ADMIN — ATORVASTATIN CALCIUM 80 MILLIGRAM(S): 80 TABLET, FILM COATED ORAL at 22:36

## 2019-10-29 RX ADMIN — HEPARIN SODIUM 8 UNIT(S)/HR: 5000 INJECTION INTRAVENOUS; SUBCUTANEOUS at 11:08

## 2019-10-29 RX ADMIN — PANTOPRAZOLE SODIUM 40 MILLIGRAM(S): 20 TABLET, DELAYED RELEASE ORAL at 06:23

## 2019-10-29 RX ADMIN — INSULIN GLARGINE 15 UNIT(S): 100 INJECTION, SOLUTION SUBCUTANEOUS at 22:37

## 2019-10-29 RX ADMIN — Medication 1200 MILLIGRAM(S): at 14:59

## 2019-10-29 RX ADMIN — CHLORHEXIDINE GLUCONATE 1 APPLICATION(S): 213 SOLUTION TOPICAL at 06:23

## 2019-10-29 NOTE — PHYSICAL THERAPY INITIAL EVALUATION ADULT - GENERAL OBSERVATIONS, REHAB EVAL
Pt received in bedside chair, + PIV locked.
Sitting in chair, Ext pacer, RIJ, Chest tube x 2, NCO2 5L

## 2019-10-29 NOTE — PROGRESS NOTE ADULT - SUBJECTIVE AND OBJECTIVE BOX
CRITICAL CARE ATTENDING - CTICU    MEDICATIONS  (STANDING):  acetaminophen   Tablet .. 650 milliGRAM(s) Oral daily  aMIOdarone    Tablet 200 milliGRAM(s) Oral daily  aspirin  chewable 81 milliGRAM(s) Oral daily  atorvastatin 80 milliGRAM(s) Oral at bedtime  chlorhexidine 2% Cloths 1 Application(s) Topical <User Schedule>  dextrose 50% Injectable 50 milliLiter(s) IV Push every 15 minutes  dextrose 50% Injectable 25 milliLiter(s) IV Push every 15 minutes  DOBUTamine Infusion 2 MICROgram(s)/kG/Min (3.474 mL/Hr) IV Continuous <Continuous>  heparin  Infusion 700 Unit(s)/Hr (8 mL/Hr) IV Continuous <Continuous>  insulin glargine Injectable (LANTUS) 10 Unit(s) SubCutaneous at bedtime  insulin regular Infusion 3 Unit(s)/Hr (3 mL/Hr) IV Continuous <Continuous>  milrinone Infusion 0.2 MICROgram(s)/kG/Min (3.474 mL/Hr) IV Continuous <Continuous>  pantoprazole    Tablet 40 milliGRAM(s) Oral before breakfast  sodium chloride 0.9%. 1000 milliLiter(s) (10 mL/Hr) IV Continuous <Continuous>                                    9.7    15.18 )-----------( 122      ( 29 Oct 2019 00:22 )             28.9       10-29    139  |  108  |  40<H>  ----------------------------<  131<H>  4.0   |  21<L>  |  1.63<H>    Ca    7.5<L>      29 Oct 2019 00:22  Phos  2.5     10-29  Mg     2.4     10-29    TPro  5.2<L>  /  Alb  2.8<L>  /  TBili  0.8  /  DBili  x   /  AST  44<H>  /  ALT  58<H>  /  AlkPhos  87  10-29      PT/INR - ( 29 Oct 2019 00:22 )   PT: 15.1 sec;   INR: 1.30 ratio         PTT - ( 29 Oct 2019 00:22 )  PTT:62.7 sec        Daily     Daily Weight in k.7 (29 Oct 2019 00:00)      10-27 @ 07:01  -  10-28 @ 07:00  --------------------------------------------------------  IN: 2086.8 mL / OUT: 1875 mL / NET: 211.8 mL    10-28 @ 07:01  -  10-29 @ 06:29  --------------------------------------------------------  IN: 1498.6 mL / OUT: 2260 mL / NET: -761.4 mL        Critically Ill patient  : [ ] preoperative ,   [ x] post operative    Requires :  [ x] Arterial Line   [x ] Central Line  [ ] PA catheter  [ ] IABP  [ ] ECMO  [ ] LVAD  [ ] Ventilator  [ ] pacemaker [ ] Impella.                      [x ] ABG's     [ x] Pulse Oxymetry Monitoring  Bedside evaluation , monitoring , treatment of hemodynamics , fluids , IVP/ IVCD meds.        Diagnosis:     POD 4 - C4B / MVR    Admitted - NSTEMI    Cardiogenic Shock  - resolving    CHF- acute [ x]   chronic [ ]    systolic [x ]   diatolic [ ]          - Echo- EF -  35%           [ ] RV dysfunction          - Cxr-cardiomegally, edema          - Clinical-  [x ]inotropes   [x ]pressors   [ x]diuresis   [ x]IABP   [ ]ECMO   [ ]LVAD   [ ]Respiratory Failure          -     Hemodynamic lability, instability. Requires IVCD [x] vasopressors [x ] inotropes  [ ] vasodilator  [ ]IVSS fluid  to maintain MAP, perfusion, C.I.     DM    Insulin drip    Temporary pacemaker (TPM) interrogation and setting.      V Tach.    IABP   [ x] management   [x ] wean 1:1 1:2 1:3   [ x] removal and f/u vascular checks     Chest tube drainage    Requires chest PT, pulmonary toilet, , suctioning to maintain SaO2,  patent airway and treat atelectasis.     Thrombocytopenia      Renal Failure - Acute Kidney Injury     hypotension    Right lower extremity pain/numbness    Absent dorsalis pedia pulse    IVCD anticoagulation with [ x] Heparin  [ ] Argatroban for R Foot                   Discussed with CT surgeon, Physician's Assistant - Nurse Practitioner- Critical care medicine team.   Dicussed at  AM / PM rounds.   Chart, labs , films reviewed.    Total Time: CRITICAL CARE ATTENDING - CTICU    MEDICATIONS  (STANDING):  acetaminophen   Tablet .. 650 milliGRAM(s) Oral daily  aMIOdarone    Tablet 200 milliGRAM(s) Oral daily  aspirin  chewable 81 milliGRAM(s) Oral daily  atorvastatin 80 milliGRAM(s) Oral at bedtime  chlorhexidine 2% Cloths 1 Application(s) Topical <User Schedule>  dextrose 50% Injectable 50 milliLiter(s) IV Push every 15 minutes  dextrose 50% Injectable 25 milliLiter(s) IV Push every 15 minutes  DOBUTamine Infusion 2 MICROgram(s)/kG/Min (3.474 mL/Hr) IV Continuous <Continuous>  heparin  Infusion 700 Unit(s)/Hr (8 mL/Hr) IV Continuous <Continuous>  insulin glargine Injectable (LANTUS) 10 Unit(s) SubCutaneous at bedtime  insulin regular Infusion 3 Unit(s)/Hr (3 mL/Hr) IV Continuous <Continuous>  milrinone Infusion 0.2 MICROgram(s)/kG/Min (3.474 mL/Hr) IV Continuous <Continuous>  pantoprazole    Tablet 40 milliGRAM(s) Oral before breakfast  sodium chloride 0.9%. 1000 milliLiter(s) (10 mL/Hr) IV Continuous <Continuous>                                    9.7    15.18 )-----------( 122      ( 29 Oct 2019 00:22 )             28.9       10-29    139  |  108  |  40<H>  ----------------------------<  131<H>  4.0   |  21<L>  |  1.63<H>    Ca    7.5<L>      29 Oct 2019 00:22  Phos  2.5     10-29  Mg     2.4     10-29    TPro  5.2<L>  /  Alb  2.8<L>  /  TBili  0.8  /  DBili  x   /  AST  44<H>  /  ALT  58<H>  /  AlkPhos  87  10-29      PT/INR - ( 29 Oct 2019 00:22 )   PT: 15.1 sec;   INR: 1.30 ratio         PTT - ( 29 Oct 2019 00:22 )  PTT:62.7 sec        Daily     Daily Weight in k.7 (29 Oct 2019 00:00)      10-27 @ 07:01  -  10-28 @ 07:00  --------------------------------------------------------  IN: 2086.8 mL / OUT: 1875 mL / NET: 211.8 mL    10-28 @ 07:01  -  10-29 @ 06:29  --------------------------------------------------------  IN: 1498.6 mL / OUT: 2260 mL / NET: -761.4 mL        Critically Ill patient  : [ ] preoperative ,   [ x] post operative    Requires :  [ x] Arterial Line   [x ] Central Line  [ ] PA catheter  [ ] IABP  [ ] ECMO  [ ] LVAD  [ ] Ventilator  [ x] pacemaker [ ] Impella.                      [x ] ABG's     [ x] Pulse Oxymetry Monitoring  Bedside evaluation , monitoring , treatment of hemodynamics , fluids , IVP/ IVCD meds.        Diagnosis:     POD 4 - C4B / MVR    Admitted - NSTEMI    Cardiogenic Shock  - resolved    CHF- acute [ x]   chronic [ ]    systolic [x ]   diatolic [ ]          - Echo- EF -  35%           [ ] RV dysfunction          - Cxr-cardiomegally, edema          - Clinical-  [x ]inotropes   [x ]pressors   [ x]diuresis   [ x]IABP   [ ]ECMO   [ ]LVAD   [ ]Respiratory Failure          -     Hemodynamic lability, instability. Requires IVCD [] vasopressors [x ] inotropes  [ ] vasodilator  [ ]IVSS fluid  to maintain MAP, perfusion, C.I.     DM    Insulin drip    Temporary pacemaker (TPM) interrogation and setting.      V Tach.    IABP  removed yesterday    Chest tube drainage    Requires chest PT, pulmonary toilet, , suctioning to maintain SaO2,  patent airway and treat atelectasis.     Thrombocytopenia      Renal Failure - Acute Kidney Injury     hypotension    Right lower extremity pain/numbness    Absent dorsalis pedia pulse    IVCD anticoagulation with [ x] Heparin  for R Foot     Requires bedside physical therapy, mobilization and total residential care.                   Discussed with CT surgeon, Physician's Assistant - Nurse Practitioner- Critical care medicine team.   Dicussed at  AM / PM rounds.   Chart, labs , films reviewed.    Total Time: 30 min CRITICAL CARE ATTENDING - CTICU    MEDICATIONS  (STANDING):  acetaminophen   Tablet .. 650 milliGRAM(s) Oral daily  aMIOdarone    Tablet 200 milliGRAM(s) Oral daily  aspirin  chewable 81 milliGRAM(s) Oral daily  atorvastatin 80 milliGRAM(s) Oral at bedtime  chlorhexidine 2% Cloths 1 Application(s) Topical <User Schedule>  dextrose 50% Injectable 50 milliLiter(s) IV Push every 15 minutes  dextrose 50% Injectable 25 milliLiter(s) IV Push every 15 minutes  DOBUTamine Infusion 2 MICROgram(s)/kG/Min (3.474 mL/Hr) IV Continuous <Continuous>  heparin  Infusion 700 Unit(s)/Hr (8 mL/Hr) IV Continuous <Continuous>  insulin glargine Injectable (LANTUS) 10 Unit(s) SubCutaneous at bedtime  insulin regular Infusion 3 Unit(s)/Hr (3 mL/Hr) IV Continuous <Continuous>  milrinone Infusion 0.2 MICROgram(s)/kG/Min (3.474 mL/Hr) IV Continuous <Continuous>  pantoprazole    Tablet 40 milliGRAM(s) Oral before breakfast  sodium chloride 0.9%. 1000 milliLiter(s) (10 mL/Hr) IV Continuous <Continuous>                                    9.7    15.18 )-----------( 122      ( 29 Oct 2019 00:22 )             28.9       10-29    139  |  108  |  40<H>  ----------------------------<  131<H>  4.0   |  21<L>  |  1.63<H>    Ca    7.5<L>      29 Oct 2019 00:22  Phos  2.5     10-29  Mg     2.4     10-29    TPro  5.2<L>  /  Alb  2.8<L>  /  TBili  0.8  /  DBili  x   /  AST  44<H>  /  ALT  58<H>  /  AlkPhos  87  10-29      PT/INR - ( 29 Oct 2019 00:22 )   PT: 15.1 sec;   INR: 1.30 ratio         PTT - ( 29 Oct 2019 00:22 )  PTT:62.7 sec        Daily     Daily Weight in k.7 (29 Oct 2019 00:00)      10-27 @ 07:01  -  10-28 @ 07:00  --------------------------------------------------------  IN: 2086.8 mL / OUT: 1875 mL / NET: 211.8 mL    10-28 @ 07:01  -  10-29 @ 06:29  --------------------------------------------------------  IN: 1498.6 mL / OUT: 2260 mL / NET: -761.4 mL        Critically Ill patient  : [ ] preoperative ,   [ x] post operative    Requires :  [ x] Arterial Line   [x ] Central Line  [ ] PA catheter  [ ] IABP  [ ] ECMO  [ ] LVAD  [ ] Ventilator  [ x] pacemaker [ ] Impella.                      [x ] ABG's     [ x] Pulse Oxymetry Monitoring  Bedside evaluation , monitoring , treatment of hemodynamics , fluids , IVP/ IVCD meds.        Diagnosis:     POD 4 - C4B / MVR    Admitted - NSTEMI    Cardiogenic Shock  - resolved    CHF- acute [ x]   chronic [ ]    systolic [x ]   diatolic [ ]          - Echo- EF -  35%           [ ] RV dysfunction          - Cxr-cardiomegally, edema          - Clinical-  [x ]inotropes   [x ]pressors   [ x]diuresis   [ x]IABP   [ ]ECMO   [ ]LVAD   [ ]Respiratory Failure          -     Hemodynamic lability, instability. Requires IVCD [] vasopressors [x ] inotropes  [ ] vasodilator  [ ]IVSS fluid  to maintain MAP, perfusion, C.I.     DM    Insulin drip    Temporary pacemaker (TPM) interrogation and setting.      V Tach.    IABP  removed yesterday    Chest tube drainage    Requires chest PT, pulmonary toilet, , suctioning to maintain SaO2,  patent airway and treat atelectasis.     Thrombocytopenia      Renal Failure - Acute Kidney Injury     hypotension    Right lower extremity pain/numbness    Absent dorsalis pedia pulse    IVCD anticoagulation with [ x] Heparin  for R Foot     Requires bedside physical therapy, mobilization and total residential care.     May require CT Angiogram of R Leg                  Discussed with CT surgeon, Physician's Assistant - Nurse Practitioner- Critical care medicine team.   Madinaed at  AM / PM rounds.   Chart, labs , films reviewed.    Total Time: 30 min

## 2019-10-29 NOTE — CONSULT NOTE ADULT - SUBJECTIVE AND OBJECTIVE BOX
VASCULAR SURGERY CONSULT NOTE    HPI:    PMHx: Cerebrovascular accident (CVA)  Hyperlipidemia  Diabetes mellitus    PSHx: No significant past surgical history    Medications (inpatient): aMIOdarone    Tablet 200 milliGRAM(s) Oral daily  aspirin  chewable 81 milliGRAM(s) Oral daily  atorvastatin 80 milliGRAM(s) Oral at bedtime  chlorhexidine 2% Cloths 1 Application(s) Topical <User Schedule>  dextrose 50% Injectable 50 milliLiter(s) IV Push every 15 minutes  dextrose 50% Injectable 25 milliLiter(s) IV Push every 15 minutes  DOBUTamine Infusion 2 MICROgram(s)/kG/Min IV Continuous <Continuous>  furosemide    Tablet 40 milliGRAM(s) Oral daily  heparin  Infusion 800 Unit(s)/Hr IV Continuous <Continuous>  insulin glargine Injectable (LANTUS) 15 Unit(s) SubCutaneous at bedtime  insulin regular Infusion 3 Unit(s)/Hr IV Continuous <Continuous>  pantoprazole    Tablet 40 milliGRAM(s) Oral before breakfast  sodium chloride 0.9%. 1000 milliLiter(s) IV Continuous <Continuous>  spironolactone 25 milliGRAM(s) Oral daily    Medications (PRN):  Allergies: No Known Allergies  (Intolerances: )  Social Hx:   Family Hx: No pertinent family history in first degree relatives      Physical Exam  T(C): 36.8  HR: 91 (74 - 91)  BP: 144/70 (96/57 - 144/70)  RR: 31 (19 - 47)  SpO2: 100% (97% - 100%)  Tmax: T(C): , Max: 37.8 (10-28-19 @ 20:00)    10-28-19  -  10-29-19  --------------------------------------------------------  IN:    amiodarone Infusion: 16.6 mL    DOBUTamine Infusion: 80.5 mL    DOBUTamine Infusion: 1.5 mL    heparin Infusion: 172 mL    insulin regular Infusion: 54 mL    milrinone  Infusion: 84 mL    Oral Fluid: 500 mL    Packed Red Blood Cells: 350 mL    sodium chloride 0.9%.: 240 mL  Total IN: 1498.6 mL    OUT:    Bulb: 10 mL    Chest Tube: 230 mL    Chest Tube: 240 mL    Indwelling Catheter - Urethral: 1850 mL  Total OUT: 2330 mL    Total NET: -831.4 mL      10-29-19  -  10-29-19  --------------------------------------------------------  IN:    DOBUTamine Infusion: 14 mL    heparin Infusion: 8 mL    heparin Infusion: 16 mL    insulin regular Infusion: 6 mL    Oral Fluid: 240 mL    sodium chloride 0.9%.: 40 mL  Total IN: 324 mL    OUT:    Bulb: 30 mL    Chest Tube: 30 mL    Chest Tube: 30 mL    Indwelling Catheter - Urethral: 275 mL  Total OUT: 365 mL    Total NET: -41 mL    General: well developed, well nourished, anxious  Neuro: alert and oriented, no focal deficits, moves all extremities spontaneously  Respiratory: nonlabored on RA  CVS: regular rate and rhythm  Abdomen: soft, nontender, nondistended  Extremities: no edema, Dressings to RLE are clean and dry. IABP access site in R groin is  dry and hemostatic, mildly tender    Labs:                        9.7    15.18 )-----------( 122      ( 29 Oct 2019 00:22 )             28.9     PT/INR - ( 29 Oct 2019 00:22 )   PT: 15.1 sec;   INR: 1.30 ratio         PTT - ( 29 Oct 2019 06:25 )  PTT:52.8 sec  10-29    139  |  108  |  40<H>  ----------------------------<  131<H>  4.0   |  21<L>  |  1.63<H>    Ca    7.5<L>      29 Oct 2019 00:22  Phos  2.5     10-29  Mg     2.4     10-29    TPro  5.2<L>  /  Alb  2.8<L>  /  TBili  0.8  /  DBili  x   /  AST  44<H>  /  ALT  58<H>  /  AlkPhos  87  10-29      ABG - ( 28 Oct 2019 08:09 )  pH, Arterial: 7.50  pH, Blood: x     /  pCO2: 26    /  pO2: 101   / HCO3: 20    / Base Excess: -1.9  /  SaO2: 97            Imaging and other studies: VASCULAR SURGERY CONSULT NOTE    HPI:  63F significant cardiac history, now POD4 from CABG x4 (BIMA, RSVG, L radial) and MVR, she had had IABP placed preop which was removed yesterday. This morning she had complaints of RLE numbness from mid calf to toes and she was found to have coolness of this area of the RLE compared to the LLE. Vascular surgery consulted for concerns of compromised arterial flow. Patient seen and examined. Primarily complaining of RLE numbness from mid calf to toes. Denies pain or weakness in the RLE.     PMHx: Cerebrovascular accident (CVA)  Hyperlipidemia  Diabetes mellitus    PSHx: No significant past surgical history    Medications (inpatient): aMIOdarone    Tablet 200 milliGRAM(s) Oral daily  aspirin  chewable 81 milliGRAM(s) Oral daily  atorvastatin 80 milliGRAM(s) Oral at bedtime  chlorhexidine 2% Cloths 1 Application(s) Topical <User Schedule>  dextrose 50% Injectable 50 milliLiter(s) IV Push every 15 minutes  dextrose 50% Injectable 25 milliLiter(s) IV Push every 15 minutes  DOBUTamine Infusion 2 MICROgram(s)/kG/Min IV Continuous <Continuous>  furosemide    Tablet 40 milliGRAM(s) Oral daily  heparin  Infusion 800 Unit(s)/Hr IV Continuous <Continuous>  insulin glargine Injectable (LANTUS) 15 Unit(s) SubCutaneous at bedtime  insulin regular Infusion 3 Unit(s)/Hr IV Continuous <Continuous>  pantoprazole    Tablet 40 milliGRAM(s) Oral before breakfast  sodium chloride 0.9%. 1000 milliLiter(s) IV Continuous <Continuous>  spironolactone 25 milliGRAM(s) Oral daily    Medications (PRN):  Allergies: No Known Allergies  (Intolerances: )  Social Hx:   Family Hx: No pertinent family history in first degree relatives      Physical Exam  T(C): 36.8  HR: 91 (74 - 91)  BP: 144/70 (96/57 - 144/70)  RR: 31 (19 - 47)  SpO2: 100% (97% - 100%)  Tmax: T(C): , Max: 37.8 (10-28-19 @ 20:00)    10-28-19  -  10-29-19  --------------------------------------------------------  IN:    amiodarone Infusion: 16.6 mL    DOBUTamine Infusion: 80.5 mL    DOBUTamine Infusion: 1.5 mL    heparin Infusion: 172 mL    insulin regular Infusion: 54 mL    milrinone  Infusion: 84 mL    Oral Fluid: 500 mL    Packed Red Blood Cells: 350 mL    sodium chloride 0.9%.: 240 mL  Total IN: 1498.6 mL    OUT:    Bulb: 10 mL    Chest Tube: 230 mL    Chest Tube: 240 mL    Indwelling Catheter - Urethral: 1850 mL  Total OUT: 2330 mL    Total NET: -831.4 mL      10-29-19  -  10-29-19  --------------------------------------------------------  IN:    DOBUTamine Infusion: 14 mL    heparin Infusion: 8 mL    heparin Infusion: 16 mL    insulin regular Infusion: 6 mL    Oral Fluid: 240 mL    sodium chloride 0.9%.: 40 mL  Total IN: 324 mL    OUT:    Bulb: 30 mL    Chest Tube: 30 mL    Chest Tube: 30 mL    Indwelling Catheter - Urethral: 275 mL  Total OUT: 365 mL    Total NET: -41 mL    General: well developed, well nourished, anxious  Neuro: alert and oriented, no focal deficits, moves all extremities spontaneously  Chest/Respiratory: nonlabored on RA, Sternal dressings are clean and dry  CVS: regular rate and rhythm  Abdomen: soft, nontender, nondistended  Extremities: no edema, Dressings to RLE are clean and dry. IABP access site in R groin is  dry and hemostatic, mildly tender    Labs:                        9.7    15.18 )-----------( 122      ( 29 Oct 2019 00:22 )             28.9     PT/INR - ( 29 Oct 2019 00:22 )   PT: 15.1 sec;   INR: 1.30 ratio         PTT - ( 29 Oct 2019 06:25 )  PTT:52.8 sec  10-29    139  |  108  |  40<H>  ----------------------------<  131<H>  4.0   |  21<L>  |  1.63<H>    Ca    7.5<L>      29 Oct 2019 00:22  Phos  2.5     10-29  Mg     2.4     10-29    TPro  5.2<L>  /  Alb  2.8<L>  /  TBili  0.8  /  DBili  x   /  AST  44<H>  /  ALT  58<H>  /  AlkPhos  87  10-29      ABG - ( 28 Oct 2019 08:09 )  pH, Arterial: 7.50  pH, Blood: x     /  pCO2: 26    /  pO2: 101   / HCO3: 20    / Base Excess: -1.9  /  SaO2: 97            Imaging and other studies:  < from: CT Angio Abd Aorta w/run-off w/ IV Cont (10.29.19 @ 16:01) >    FINDINGS:    CENTRAL ARTERIAL SYSTEM:     Abdominal aorta is patent with mild atherosclerotic change. No abdominal   aortic aneurysm. Celiac axis, SMA and DARLENE are patent as are the renal   arteries.    RIGHTLOWER EXTREMITY:    Right common and external iliac arteries are widely patent. A 6 mm linear   filling defect is noted within the right common femoral artery extending   into the proximal portion of the right femoral artery consistent with   thrombus. Profunda femoris is patent. Remainder of the femoral artery is   patent. Popliteal artery is patent. Short segment occlusion of the   proximal anterior tibial artery and tibioperoneal trunk secondary to   embolic disease with prompt reconstitution.Three-vessel runoff to the   ankle.    LEFT LOWER EXTREMITY:    Left common and external iliac arteries are widely patent as is the left   common femoral artery. Left femoral artery and profunda femoris are   widely patent as is the popliteal artery.Three-vessel runoff is present   to the ankle.    ADDITIONAL FINDINGS: Status post sternotomy. Trace bilateral pleural   effusions with associated compressive atelectasis. Bilateral chest tubes.   Pericardial drain.    IMPRESSION:     Short segment occlusion of the proximal right anterior tibial artery and   tibioperoneal trunk secondary to embolic disease with prompt   reconstitution. Three-vessel runoff to the ankle.    An additional 6 mm linear thrombus within the right common   femoral/proximal right femoral artery.      < end of copied text >

## 2019-10-29 NOTE — PROGRESS NOTE ADULT - ASSESSMENT
Assessment  DMT2: 63y Female with DM T2 with hyperglycemia, A1C 7.5%, was on oral meds, now S/P CABG on insulin, blood sugars trending up, no hypoglycemic episode, patient ate breakfast, now NPO for procedure and back on insulin drip, appears comfortable.  CAD: S/P CABG 10/25, on medications, no chest pain, stable, monitored.  HTN: Controlled,  on antihypertensive medications.  HLD: On statin, compliant with  intake.          Mireya Niño MD  Cell: [phone]  Office: [phone] Assessment  DMT2: 63y Female with DM T2 with hyperglycemia, A1C 7.5%, was on oral meds, now S/P CABG on insulin, blood sugars trending up,  no hypoglycemic episode, patient ate breakfast, now NPO for procedure and back on insulin drip, appears comfortable.  CAD: S/P CABG 10/25, on medications, no chest pain, stable, monitored.  HTN: Controlled,  on antihypertensive medications.  HLD: On statin, compliant with  intake.          Mireya Niño MD  Cell: [phone]  Office: [phone]

## 2019-10-29 NOTE — PROGRESS NOTE ADULT - SUBJECTIVE AND OBJECTIVE BOX
Chief complaint  Patient is a 63y old  Female who presents with a chief complaint of NSTEMI (29 Oct 2019 11:22)   Review of systems  Patient in bed, looks comfortable, no fever, no hypoglycemia.    Labs and Fingersticks  CAPILLARY BLOOD GLUCOSE  262 (29 Oct 2019 14:00)  268 (29 Oct 2019 13:00)  277 (29 Oct 2019 12:00)  247 (29 Oct 2019 10:30)  167 (29 Oct 2019 08:00)  99 (29 Oct 2019 07:00)  95 (29 Oct 2019 06:00)  88 (29 Oct 2019 05:00)  109 (29 Oct 2019 04:00)  156 (29 Oct 2019 02:00)  125 (29 Oct 2019 01:00)  108 (29 Oct 2019 00:00)  106 (28 Oct 2019 23:00)  135 (28 Oct 2019 22:00)  172 (28 Oct 2019 21:00)  251 (28 Oct 2019 20:00)  282 (28 Oct 2019 19:00)      POCT Blood Glucose.: 221 mg/dL (29 Oct 2019 14:51)  POCT Blood Glucose.: 262 mg/dL (29 Oct 2019 13:41)  POCT Blood Glucose.: 277 mg/dL (29 Oct 2019 11:58)  POCT Blood Glucose.: 247 mg/dL (29 Oct 2019 10:38)  POCT Blood Glucose.: 167 mg/dL (29 Oct 2019 08:38)  POCT Blood Glucose.: 99 mg/dL (29 Oct 2019 07:02)  POCT Blood Glucose.: 95 mg/dL (29 Oct 2019 06:05)  POCT Blood Glucose.: 88 mg/dL (29 Oct 2019 05:13)  POCT Blood Glucose.: 109 mg/dL (29 Oct 2019 04:10)  POCT Blood Glucose.: 119 mg/dL (29 Oct 2019 03:14)  POCT Blood Glucose.: 156 mg/dL (29 Oct 2019 02:03)  POCT Blood Glucose.: 125 mg/dL (29 Oct 2019 01:02)  POCT Blood Glucose.: 108 mg/dL (29 Oct 2019 00:07)  POCT Blood Glucose.: 106 mg/dL (28 Oct 2019 23:05)  POCT Blood Glucose.: 135 mg/dL (28 Oct 2019 22:05)  POCT Blood Glucose.: 172 mg/dL (28 Oct 2019 21:07)  POCT Blood Glucose.: 251 mg/dL (28 Oct 2019 20:09)  POCT Blood Glucose.: 282 mg/dL (28 Oct 2019 18:54)  POCT Blood Glucose.: 295 mg/dL (28 Oct 2019 17:51)  POCT Blood Glucose.: 261 mg/dL (28 Oct 2019 17:06)  POCT Blood Glucose.: 273 mg/dL (28 Oct 2019 15:39)      Anion Gap, Serum: 10 (10-29 @ 00:22)  Anion Gap, Serum: 12 (10-28 @ 14:25)  Anion Gap, Serum: 11 (10-28 @ 00:54)      Calcium, Total Serum: 7.5 <L> (10-29 @ 00:22)  Calcium, Total Serum: 7.6 <L> (10-28 @ 14:25)  Calcium, Total Serum: 8.0 <L> (10-28 @ 00:54)  Albumin, Serum: 2.8 <L> (10-29 @ 00:22)  Albumin, Serum: 2.8 <L> (10-28 @ 14:25)  Albumin, Serum: 3.2 <L> (10-28 @ 00:54)    Alanine Aminotransferase (ALT/SGPT): 58 <H> (10-29 @ 00:22)  Alanine Aminotransferase (ALT/SGPT): 52 <H> (10-28 @ 14:25)  Alanine Aminotransferase (ALT/SGPT): 41 (10-28 @ 00:54)  Alkaline Phosphatase, Serum: 87 (10-29 @ 00:22)  Alkaline Phosphatase, Serum: 78 (10-28 @ 14:25)  Alkaline Phosphatase, Serum: 55 (10-28 @ 00:54)  Aspartate Aminotransferase (AST/SGOT): 44 <H> (10-29 @ 00:22)  Aspartate Aminotransferase (AST/SGOT): 66 <H> (10-28 @ 14:25)  Aspartate Aminotransferase (AST/SGOT): 41 <H> (10-28 @ 00:54)        10-29    139  |  108  |  40<H>  ----------------------------<  131<H>  4.0   |  21<L>  |  1.63<H>    Ca    7.5<L>      29 Oct 2019 00:22  Phos  2.5     10-29  Mg     2.4     10-29    TPro  5.2<L>  /  Alb  2.8<L>  /  TBili  0.8  /  DBili  x   /  AST  44<H>  /  ALT  58<H>  /  AlkPhos  87  10-29                        9.7    15.18 )-----------( 122      ( 29 Oct 2019 00:22 )             28.9     Medications  MEDICATIONS  (STANDING):  aMIOdarone    Tablet 200 milliGRAM(s) Oral daily  aspirin  chewable 81 milliGRAM(s) Oral daily  atorvastatin 80 milliGRAM(s) Oral at bedtime  chlorhexidine 2% Cloths 1 Application(s) Topical <User Schedule>  dextrose 50% Injectable 50 milliLiter(s) IV Push every 15 minutes  dextrose 50% Injectable 25 milliLiter(s) IV Push every 15 minutes  DOBUTamine Infusion 2 MICROgram(s)/kG/Min (3.474 mL/Hr) IV Continuous <Continuous>  furosemide    Tablet 40 milliGRAM(s) Oral daily  heparin  Infusion 800 Unit(s)/Hr (8 mL/Hr) IV Continuous <Continuous>  insulin glargine Injectable (LANTUS) 15 Unit(s) SubCutaneous at bedtime  insulin regular Infusion 3 Unit(s)/Hr (3 mL/Hr) IV Continuous <Continuous>  pantoprazole    Tablet 40 milliGRAM(s) Oral before breakfast  sodium chloride 0.9%. 1000 milliLiter(s) (75 mL/Hr) IV Continuous <Continuous>  sodium chloride 0.9%. 1000 milliLiter(s) (10 mL/Hr) IV Continuous <Continuous>  spironolactone 25 milliGRAM(s) Oral daily      Physical Exam  General: Patient comfortable in bed  Vital Signs Last 12 Hrs  T(F): 98.2 (10-29-19 @ 11:00), Max: 98.6 (10-29-19 @ 04:00)  HR: 92 (10-29-19 @ 14:00) (80 - 92)  BP: 138/77 (10-29-19 @ 12:00) (106/57 - 144/70)  BP(mean): 100 (10-29-19 @ 12:00) (73 - 100)  RR: 34 (10-29-19 @ 13:00) (19 - 43)  SpO2: 99% (10-29-19 @ 13:00) (99% - 100%)  Neck: No palpable thyroid nodules.  CVS: S1S2, No murmurs  Respiratory: No wheezing, no crepitations  GI: Abdomen soft, bowel sounds positive  Musculoskeletal:  edema lower extremities.   Skin: No skin rashes, no ecchymosis    Diagnostics Chief complaint  Patient is a 63y old  Female who presents with a chief complaint of NSTEMI (29 Oct 2019 11:22)   Review of systems  Patient in bed, looks comfortable, no fever,  no hypoglycemia.    Labs and Fingersticks  CAPILLARY BLOOD GLUCOSE  262 (29 Oct 2019 14:00)  268 (29 Oct 2019 13:00)  277 (29 Oct 2019 12:00)  247 (29 Oct 2019 10:30)  167 (29 Oct 2019 08:00)  99 (29 Oct 2019 07:00)  95 (29 Oct 2019 06:00)  88 (29 Oct 2019 05:00)  109 (29 Oct 2019 04:00)  156 (29 Oct 2019 02:00)  125 (29 Oct 2019 01:00)  108 (29 Oct 2019 00:00)  106 (28 Oct 2019 23:00)  135 (28 Oct 2019 22:00)  172 (28 Oct 2019 21:00)  251 (28 Oct 2019 20:00)  282 (28 Oct 2019 19:00)      POCT Blood Glucose.: 221 mg/dL (29 Oct 2019 14:51)  POCT Blood Glucose.: 262 mg/dL (29 Oct 2019 13:41)  POCT Blood Glucose.: 277 mg/dL (29 Oct 2019 11:58)  POCT Blood Glucose.: 247 mg/dL (29 Oct 2019 10:38)  POCT Blood Glucose.: 167 mg/dL (29 Oct 2019 08:38)  POCT Blood Glucose.: 99 mg/dL (29 Oct 2019 07:02)  POCT Blood Glucose.: 95 mg/dL (29 Oct 2019 06:05)  POCT Blood Glucose.: 88 mg/dL (29 Oct 2019 05:13)  POCT Blood Glucose.: 109 mg/dL (29 Oct 2019 04:10)  POCT Blood Glucose.: 119 mg/dL (29 Oct 2019 03:14)  POCT Blood Glucose.: 156 mg/dL (29 Oct 2019 02:03)  POCT Blood Glucose.: 125 mg/dL (29 Oct 2019 01:02)  POCT Blood Glucose.: 108 mg/dL (29 Oct 2019 00:07)  POCT Blood Glucose.: 106 mg/dL (28 Oct 2019 23:05)  POCT Blood Glucose.: 135 mg/dL (28 Oct 2019 22:05)  POCT Blood Glucose.: 172 mg/dL (28 Oct 2019 21:07)  POCT Blood Glucose.: 251 mg/dL (28 Oct 2019 20:09)  POCT Blood Glucose.: 282 mg/dL (28 Oct 2019 18:54)  POCT Blood Glucose.: 295 mg/dL (28 Oct 2019 17:51)  POCT Blood Glucose.: 261 mg/dL (28 Oct 2019 17:06)  POCT Blood Glucose.: 273 mg/dL (28 Oct 2019 15:39)      Anion Gap, Serum: 10 (10-29 @ 00:22)  Anion Gap, Serum: 12 (10-28 @ 14:25)  Anion Gap, Serum: 11 (10-28 @ 00:54)      Calcium, Total Serum: 7.5 <L> (10-29 @ 00:22)  Calcium, Total Serum: 7.6 <L> (10-28 @ 14:25)  Calcium, Total Serum: 8.0 <L> (10-28 @ 00:54)  Albumin, Serum: 2.8 <L> (10-29 @ 00:22)  Albumin, Serum: 2.8 <L> (10-28 @ 14:25)  Albumin, Serum: 3.2 <L> (10-28 @ 00:54)    Alanine Aminotransferase (ALT/SGPT): 58 <H> (10-29 @ 00:22)  Alanine Aminotransferase (ALT/SGPT): 52 <H> (10-28 @ 14:25)  Alanine Aminotransferase (ALT/SGPT): 41 (10-28 @ 00:54)  Alkaline Phosphatase, Serum: 87 (10-29 @ 00:22)  Alkaline Phosphatase, Serum: 78 (10-28 @ 14:25)  Alkaline Phosphatase, Serum: 55 (10-28 @ 00:54)  Aspartate Aminotransferase (AST/SGOT): 44 <H> (10-29 @ 00:22)  Aspartate Aminotransferase (AST/SGOT): 66 <H> (10-28 @ 14:25)  Aspartate Aminotransferase (AST/SGOT): 41 <H> (10-28 @ 00:54)        10-29    139  |  108  |  40<H>  ----------------------------<  131<H>  4.0   |  21<L>  |  1.63<H>    Ca    7.5<L>      29 Oct 2019 00:22  Phos  2.5     10-29  Mg     2.4     10-29    TPro  5.2<L>  /  Alb  2.8<L>  /  TBili  0.8  /  DBili  x   /  AST  44<H>  /  ALT  58<H>  /  AlkPhos  87  10-29                        9.7    15.18 )-----------( 122      ( 29 Oct 2019 00:22 )             28.9     Medications  MEDICATIONS  (STANDING):  aMIOdarone    Tablet 200 milliGRAM(s) Oral daily  aspirin  chewable 81 milliGRAM(s) Oral daily  atorvastatin 80 milliGRAM(s) Oral at bedtime  chlorhexidine 2% Cloths 1 Application(s) Topical <User Schedule>  dextrose 50% Injectable 50 milliLiter(s) IV Push every 15 minutes  dextrose 50% Injectable 25 milliLiter(s) IV Push every 15 minutes  DOBUTamine Infusion 2 MICROgram(s)/kG/Min (3.474 mL/Hr) IV Continuous <Continuous>  furosemide    Tablet 40 milliGRAM(s) Oral daily  heparin  Infusion 800 Unit(s)/Hr (8 mL/Hr) IV Continuous <Continuous>  insulin glargine Injectable (LANTUS) 15 Unit(s) SubCutaneous at bedtime  insulin regular Infusion 3 Unit(s)/Hr (3 mL/Hr) IV Continuous <Continuous>  pantoprazole    Tablet 40 milliGRAM(s) Oral before breakfast  sodium chloride 0.9%. 1000 milliLiter(s) (75 mL/Hr) IV Continuous <Continuous>  sodium chloride 0.9%. 1000 milliLiter(s) (10 mL/Hr) IV Continuous <Continuous>  spironolactone 25 milliGRAM(s) Oral daily      Physical Exam  General: Patient comfortable in bed  Vital Signs Last 12 Hrs  T(F): 98.2 (10-29-19 @ 11:00), Max: 98.6 (10-29-19 @ 04:00)  HR: 92 (10-29-19 @ 14:00) (80 - 92)  BP: 138/77 (10-29-19 @ 12:00) (106/57 - 144/70)  BP(mean): 100 (10-29-19 @ 12:00) (73 - 100)  RR: 34 (10-29-19 @ 13:00) (19 - 43)  SpO2: 99% (10-29-19 @ 13:00) (99% - 100%)  Neck: No palpable thyroid nodules.  CVS: S1S2, No murmurs  Respiratory: No wheezing, no crepitations  GI: Abdomen soft, bowel sounds positive  Musculoskeletal:  edema lower extremities.   Skin: No skin rashes, no ecchymosis    Diagnostics

## 2019-10-29 NOTE — PHYSICAL THERAPY INITIAL EVALUATION ADULT - MANUAL MUSCLE TESTING RESULTS, REHAB EVAL
R knee extension 3-/5, R hip flexion 3/5, LLE 3+/5, RUE 3/5, LUE 3+/5/grossly assessed due to
grossly 3/5 x 4 ext

## 2019-10-29 NOTE — PROGRESS NOTE ADULT - PROBLEM SELECTOR PLAN 1
Suggest to continue insulin drip for now; Will continue monitoring FS and FU.  Once patient is eating meals, will switch to basal bolus insulin. Once patient is eating meals, will switch to basal bolus insulin.

## 2019-10-29 NOTE — PROCEDURE NOTE - NSPROCDETAILS_GEN_ALL_CORE
sterile dressing applied/sterile technique, catheter placed/guidewire recovered/lumen(s) aspirated and flushed/ultrasound guidance
location identified, draped/prepped, sterile technique used/sterile dressing applied/supine position/Trendelenburg position/sterile technique, catheter placed/ultrasound assessment/ultrasound guidance

## 2019-10-29 NOTE — PROCEDURE NOTE - NSSITEPREP_SKIN_A_CORE
chlorhexidine
Adherence to aseptic technique: hand hygiene prior to donning barriers (gown, gloves), don cap and mask, sterile drape over patient

## 2019-10-29 NOTE — PHYSICAL THERAPY INITIAL EVALUATION ADULT - PRECAUTIONS/LIMITATIONS, REHAB EVAL
She was transferred to [facility] for HLOC given concern for NSTEMI.  Pt now s/p 10/22 [facility] demonstrating severe 3VD and CT Surgery consulted for CABG evaluation.  CT head: slightly disproportionate cerebellar atrophy relative to the supratentorial compartment. No evidence of infarct.
She was transferred to [facility] for HLOC given concern for NSTEMI.  Pt now s/p 10/22 [facility] demonstrating severe 3VD and CT Surgery consulted for CABG evaluation.  CT head: slightly disproportionate cerebellar atrophy relative to the supratentorial compartment. No evidence of infarct. s/p CABG x4 (LIMA to OM, DELIA to LAD, SVG to PRICE, Radial artery Y graft to PDA), MVR (mitral valve replacement), Clipping, left atrial appendage on 10/26.
sternal precautions

## 2019-10-29 NOTE — PHYSICAL THERAPY INITIAL EVALUATION ADULT - PERTINENT HX OF CURRENT PROBLEM, REHAB EVAL
Pt is a 62 y/o female admitted to [facility] on 10/20/19 with DM, HTN, and CVA (requiring cane to ambulate, but no focal deficits)who presented to [facility] with a several hour history of shortness of breath and abdominal pain. Patient states these symptoms started around 4 pmyesterday to this. Prior to this day, patient denies having any similar symptoms prior to yesterday. She had an EKG at [facility] that showed ALAN in lead III and ST depressions in V2-V5. The troponing at the OSH was 1.15.
Pt is a 64 y/o female admitted to [facility] on 10/20/19 with DM, HTN, and CVA (requiring cane to ambulate, but no focal deficits)who presented to [facility] with a several hour history of shortness of breath and abdominal pain. Patient states these symptoms started around 4 pmyesterday to this. Prior to this day, patient denies having any similar symptoms prior to yesterday. She had an EKG at [facility] that showed ALAN in lead III and ST depressions in V2-V5. The troponing at the OSH was 1.15.
62 yo female with DM, HTN, and CVA (requiring cane to ambulate, but no focal deficits)who presented to [facility] with a several hour history of shortness of breath and abdominal pain.+ NSTEMI, t/f to [facility], + card cath for TVD. Now s/p MVR/C4

## 2019-10-29 NOTE — CONSULT NOTE ADULT - ASSESSMENT
62 yo female with DM, HTN, and CVA (requiring cane to ambulate, but no focal deficits)who presented to [facility] with a several hour history of shortness of breath and abdominal pain. + r NSTEMI.  s/p MVR cabg x 4 JARED repair and required IABP. today concern for cold extremity required cta LE as well.    1- CKD III   2- CAD --> s/p cabg x 4 + MVR  3- HTN hx   4- acute limb ischemia and s/p cta   5- chf hx       creatinine slightly higher.   gentle ivf tracee- contrast administration as she is high risk for decompensated chf  vascular follow up noted. for AC for now as per recommendations   on aldactone monitor k closely with abn creatinine  d/w ctu team when seen earlier

## 2019-10-29 NOTE — PHYSICAL THERAPY INITIAL EVALUATION ADULT - GAIT DEVIATIONS NOTED, PT EVAL
decreased rachael/decreased weight-shifting ability
decreased rachael/dcr R foot clearance during swing/decreased step length/decreased stride length

## 2019-10-29 NOTE — CONSULT NOTE ADULT - SUBJECTIVE AND OBJECTIVE BOX
[facility]  [phone]  NEPHROLOGY      INITIAL CONSULT NOTE  --------------------------------------------------------------------------------  HPI:     62 yo female with DM, HTN, and CVA (requiring cane to ambulate, but no focal deficits)who presented to [facility] with a several hour history of shortness of breath and abdominal pain. She had an EKG at [facility] that showed ALAN in lead III and ST depressions in V2-V5. The troponin at the OSH was 1.15, nd propBNP of 4000. She was given lasix, ASA/Brillinta loaded, and started on heparin drip. She was transferred to [facility] for HLOC given concern for NSTEMI.  s/p MVR cabg x 4 JARED repair and required IABP. today concern for cold extremity required cta LE as well. renal consult called.       PAST HISTORY  --------------------------------------------------------------------------------  PAST MEDICAL & SURGICAL HISTORY:  Cerebrovascular accident (CVA)  Hyperlipidemia  Diabetes mellitus  No significant past surgical history    FAMILY HISTORY:  No pertinent family history in first degree relatives    PAST SOCIAL HISTORY:   no tobacco use   ALLERGIES & MEDICATIONS  --------------------------------------------------------------------------------  Allergies    No Known Allergies    Intolerances      Standing Inpatient Medications  aMIOdarone    Tablet 200 milliGRAM(s) Oral daily  aspirin  chewable 81 milliGRAM(s) Oral daily  atorvastatin 80 milliGRAM(s) Oral at bedtime  chlorhexidine 2% Cloths 1 Application(s) Topical <User Schedule>  dextrose 50% Injectable 50 milliLiter(s) IV Push every 15 minutes  dextrose 50% Injectable 25 milliLiter(s) IV Push every 15 minutes  DOBUTamine Infusion 2 MICROgram(s)/kG/Min IV Continuous <Continuous>  furosemide    Tablet 40 milliGRAM(s) Oral daily  heparin  Infusion 800 Unit(s)/Hr IV Continuous <Continuous>  insulin glargine Injectable (LANTUS) 15 Unit(s) SubCutaneous at bedtime  insulin regular Infusion 3 Unit(s)/Hr IV Continuous <Continuous>  pantoprazole    Tablet 40 milliGRAM(s) Oral before breakfast  sodium chloride 0.9%. 1000 milliLiter(s) IV Continuous <Continuous>  sodium chloride 0.9%. 1000 milliLiter(s) IV Continuous <Continuous>  spironolactone 25 milliGRAM(s) Oral daily  warfarin 2 milliGRAM(s) Oral once    PRN Inpatient Medications      REVIEW OF SYSTEMS  --------------------------------------------------------------------------------  Gen: No  fevers/chills   Skin: No rashes  Head/Eyes/Ears/Mouth: No headache; Normal hearing;  No sinus pain/discomfort, sore throat  Respiratory: No worsening  dyspnea, cough, wheezing  CV: when seen earlier. + chest pain,  no palp   GI: No abdominal pain, diarrhea, nausea, vomiting, melena  : No dysuria, decrease urination or hesitancy urinating  hematuria, nocturia  MSK: No joint pain/swelling; no back pain  Neuro: No dizziness/lightheadedness,   also with no edema     All other systems were reviewed and are negative, except as noted.    VITALS/PHYSICAL EXAM  --------------------------------------------------------------------------------  T(C): 38.1 (10-29-19 @ 20:00), Max: 38.1 (10-29-19 @ 15:00)  HR: 89 (10-29-19 @ 22:00) (80 - 92)  BP: 138/68 (10-29-19 @ 21:00) (106/57 - 176/79)  RR: 19 (10-29-19 @ 22:00) (19 - 43)  SpO2: 100% (10-29-19 @ 22:00) (98% - 100%)  Wt(kg): --        10-28-19 @ 07:01  -  10-29-19 @ 07:00  --------------------------------------------------------  IN: 1498.6 mL / OUT: 2330 mL / NET: -831.4 mL    10-29-19 @ 07:01  -  10-29-19 @ 22:25  --------------------------------------------------------  IN: 1342 mL / OUT: 1895 mL / NET: -553 mL      Physical Exam:  	Gen: mildly tachypneic when seen  	no jvd ,  	Pulm: decrease bs  no rales or ronchi or wheezing  	CV: RRR, S1S2; no rub  	Abd: + hypoactivec BS, soft, nontender/nondistended  	: No suprapubic tenderness  	UE: Warm, no cyanosis  no clubbing,  no edema; no asterixis  	LE:   + LE cyanosis  no clubbing, no edema  	Neuro: alert and oriented. speech coherent   	Skin: no decrease skin turgor       LABS/STUDIES  --------------------------------------------------------------------------------              9.7    15.18 >-----------<  122      [10-29-19 @ 00:22]              28.9     139  |  108  |  40  ----------------------------<  131      [10-29-19 @ 00:22]  4.0   |  21  |  1.63        Ca     7.5     [10-29-19 @ 00:22]      Mg     2.4     [10-29-19 @ 00:22]      Phos  2.5     [10-29-19 @ 00:22]    TPro  5.2  /  Alb  2.8  /  TBili  0.8  /  DBili  x   /  AST  44  /  ALT  58  /  AlkPhos  87  [10-29-19 @ 00:22]    PT/INR: PT 14.1 , INR 1.23       [10-29-19 @ 16:34]  PTT: 53.3       [10-29-19 @ 16:34]      Creatinine Trend:  SCr 1.63 [10-29 @ 00:22]  SCr 1.77 [10-28 @ 14:25]  SCr 1.94 [10-28 @ 00:54]  SCr 1.54 [10-27 @ 01:01]  SCr 1.42 [10-26 @ 19:54]    Urinalysis - [10-24-19 @ 12:05]      Color Light Yellow / Appearance Clear / SG 1.011 / pH 6.5      Gluc Negative / Ketone Negative  / Bili Negative / Urobili Negative       Blood Negative / Protein Negative / Leuk Est Negative / Nitrite Negative      RBC  / WBC  / Hyaline  / Gran  / Sq Epi  / Non Sq Epi  / Bacteria       Iron 44, TIBC 251, %sat 18      [10-22-19 @ 07:44]  Ferritin 113      [10-22-19 @ 09:18]  HbA1c 7.5      [10-20-19 @ 08:45]  TSH 0.40      [10-23-19 @ 08:36]    HCV 0.08, Nonreact      [10-20-19 @ 08:26]    < from: CT Angio Abd Aorta w/run-off w/ IV Cont (10.29.19 @ 16:01) >    EXAM:  CT ANGIO ABD AOR W RUN(W)AW IC                            PROCEDURE DATE:  10/29/2019            INTERPRETATION:  CLINICAL INFORMATION: Status post intra-aortic balloon   pump now with right lower extremity ischemia.    COMPARISON: None.    CT ANGIOGRAM ABDOMEN, PELVIS, AND LOWER EXTREMITIES:    PROCEDURE:   Initially, nonenhanced CT was obtained through the calves. Then,   following the rapid administration of intravenous contrast, CT   angiography was performed through the abdomen, pelvis, and lower   extremities down to the toes.  Delayed images through the calves were   also obtained. Sagittal and coronal reformats as well as 3D   reconstructions were performed.    125 mls of Omnipaque 350 was administered intravenously without   complication and 25 mls were discarded.    FINDINGS:    CENTRAL ARTERIAL SYSTEM:     Abdominal aorta is patent with mild atherosclerotic change. No abdominal   aortic aneurysm. Celiac axis, SMA and DARLENE are patent as are the renal   arteries.    RIGHTLOWER EXTREMITY:    Right common and external iliac arteries are widely patent. A 6 mm linear   filling defect is noted within the right common femoral artery extending   into the proximal portion of the right femoral artery consistent with   thrombus. Profunda femoris is patent. Remainder of the femoral artery is   patent. Popliteal artery is patent. Short segment occlusion of the   proximal anterior tibial artery and tibioperoneal trunk secondary to   embolic disease with prompt reconstitution.Three-vessel runoff to the   ankle.    LEFT LOWER EXTREMITY:    Left common and external iliac arteries are widely patent as is the left   common femoral artery. Left femoral artery and profunda femoris are   widely patent as is the popliteal artery.Three-vessel runoff is present   to the ankle.    ADDITIONAL FINDINGS: Status post sternotomy. Trace bilateral pleural   effusions with associated compressive atelectasis. Bilateral chest tubes.   Pericardial drain.    IMPRESSION:     Short segment occlusion of the proximal right anterior tibial artery and   tibioperoneal trunk secondary to embolic disease with prompt   reconstitution. Three-vessel runoff to the ankle.    An additional 6 mm linear thrombus within the right common   femoral/proximal right femoral artery.    < end of copied text >

## 2019-10-29 NOTE — CONSULT NOTE ADULT - CONSULT REASON
Coolness of RLE after removal balloon pump
NSTEMI
abn creatinine
severe multivessel CAD
High Blood Sugars/DMT2

## 2019-10-29 NOTE — PHYSICAL THERAPY INITIAL EVALUATION ADULT - ADDITIONAL COMMENTS
Pt lives in a private house with dtr with 5 steps to enter. Pt was Ind with all ADLs and amb without AD.
Pt lives in a private house with dtr with 5 steps to enter. Pt was Ind with all ADLs and amb without AD.
Pt lives w/ dtr in a pvt single level house w/ 4 steps to enter. PTA indep w/ ADls using st cane. Per pt, plan to stay w/ other dtr post d/c x 2 wks. Pt reports several steps to enter no within.

## 2019-10-29 NOTE — PROGRESS NOTE ADULT - SUBJECTIVE AND OBJECTIVE BOX
Patient  is  seen she  is post  CABG and  Mitral  valve  she  was complaining  of  right  foot  numness  At present  she  feels  muchn better  No more  numbness No  rest pain  right  foot  Physical exam  reveals  right  femoral  and  politeal pulse  striong  and  weak but palpable  DP  Completely intact motor  and  sensory  exam  CTA  reviewed  She  has  5mm  probable  thrombus  nonocclusive int he  CFa  SFA patent  POPliteal patent  Distal pop   TP trunk and  AT  short segment  ocvclusion  and  3 vessel reconstitution  distally intop the  foot  Foot  is  pink  warm  ,  Impression  IAB related  thrombus  in the  CFA with  distal embolic  event   small burden  gfA  thrombus  non  occlusive  Being  the  patient  does  not have  Limb threat  suggest  Anticoagulation  with Heparin  in  the  PTT  70 range  . Continue  to observe  If  there is  worsening  ischemia  would  then  need operative  CFA thrombectomy and  tibial;  thromboembolectomy  i spoke  to CTU staff

## 2019-10-29 NOTE — CONSULT NOTE ADULT - ASSESSMENT
ASSESSMENT  63F POD4 from CABG x4 (BIMA, RSVG, L radial) and MR, had had IABP placed preop which was removed yesterday, found to have coolness and complaints of numbness in RLE this morning    - Recommend CTA aortoiliac with run off  - Discussed with Dr. Suarez ASSESSMENT  63F POD4 from CABG x4 (BIMA, RSVG, L radial) and MR, had had IABP placed preop which was removed yesterday, found to have coolness and complaints of numbness in RLE this morning    - CTA aortoiliac with run off reviewed  - Recommendations to follow by Dr. Suarez    Vascular Surgery Team  [id number]

## 2019-10-29 NOTE — PHYSICAL THERAPY INITIAL EVALUATION ADULT - PLANNED THERAPY INTERVENTIONS, PT EVAL
1. LTG Pt will be indep to neg 4 steps using HR and step to gait w/in 2 wks/gait training/transfer training/bed mobility training

## 2019-10-30 DIAGNOSIS — Z95.1 PRESENCE OF AORTOCORONARY BYPASS GRAFT: ICD-10-CM

## 2019-10-30 DIAGNOSIS — F05 DELIRIUM DUE TO KNOWN PHYSIOLOGICAL CONDITION: ICD-10-CM

## 2019-10-30 LAB
ALBUMIN SERPL ELPH-MCNC: 2.4 G/DL — LOW (ref 3.3–5)
ALP SERPL-CCNC: 94 U/L — SIGNIFICANT CHANGE UP (ref 40–120)
ALT FLD-CCNC: 42 U/L — SIGNIFICANT CHANGE UP (ref 10–45)
ANION GAP SERPL CALC-SCNC: 7 MMOL/L — SIGNIFICANT CHANGE UP (ref 5–17)
APTT BLD: 52.7 SEC — HIGH (ref 27.5–36.3)
APTT BLD: 77.5 SEC — HIGH (ref 27.5–36.3)
AST SERPL-CCNC: 26 U/L — SIGNIFICANT CHANGE UP (ref 10–40)
BASE EXCESS BLDV CALC-SCNC: -0.6 MMOL/L — SIGNIFICANT CHANGE UP (ref -2–2)
BASE EXCESS BLDV CALC-SCNC: -2.4 MMOL/L — LOW (ref -2–2)
BASE EXCESS BLDV CALC-SCNC: 0.5 MMOL/L — SIGNIFICANT CHANGE UP (ref -2–2)
BILIRUB SERPL-MCNC: 0.6 MG/DL — SIGNIFICANT CHANGE UP (ref 0.2–1.2)
BUN SERPL-MCNC: 35 MG/DL — HIGH (ref 7–23)
CA-I SERPL-SCNC: 1.07 MMOL/L — LOW (ref 1.12–1.3)
CA-I SERPL-SCNC: 1.1 MMOL/L — LOW (ref 1.12–1.3)
CA-I SERPL-SCNC: 1.11 MMOL/L — LOW (ref 1.12–1.3)
CALCIUM SERPL-MCNC: 7.5 MG/DL — LOW (ref 8.4–10.5)
CHLORIDE BLDV-SCNC: 108 MMOL/L — SIGNIFICANT CHANGE UP (ref 96–108)
CHLORIDE BLDV-SCNC: 109 MMOL/L — HIGH (ref 96–108)
CHLORIDE BLDV-SCNC: 111 MMOL/L — HIGH (ref 96–108)
CHLORIDE SERPL-SCNC: 106 MMOL/L — SIGNIFICANT CHANGE UP (ref 96–108)
CO2 BLDV-SCNC: 21 MMOL/L — LOW (ref 22–30)
CO2 BLDV-SCNC: 24 MMOL/L — SIGNIFICANT CHANGE UP (ref 22–30)
CO2 BLDV-SCNC: 25 MMOL/L — SIGNIFICANT CHANGE UP (ref 22–30)
CO2 SERPL-SCNC: 22 MMOL/L — SIGNIFICANT CHANGE UP (ref 22–31)
CREAT SERPL-MCNC: 1.18 MG/DL — SIGNIFICANT CHANGE UP (ref 0.5–1.3)
GAS PNL BLDV: 134 MMOL/L — LOW (ref 135–145)
GAS PNL BLDV: 134 MMOL/L — LOW (ref 135–145)
GAS PNL BLDV: 136 MMOL/L — SIGNIFICANT CHANGE UP (ref 135–145)
GAS PNL BLDV: SIGNIFICANT CHANGE UP
GLUCOSE BLDC GLUCOMTR-MCNC: 104 MG/DL — HIGH (ref 70–99)
GLUCOSE BLDC GLUCOMTR-MCNC: 107 MG/DL — HIGH (ref 70–99)
GLUCOSE BLDC GLUCOMTR-MCNC: 117 MG/DL — HIGH (ref 70–99)
GLUCOSE BLDC GLUCOMTR-MCNC: 132 MG/DL — HIGH (ref 70–99)
GLUCOSE BLDC GLUCOMTR-MCNC: 138 MG/DL — HIGH (ref 70–99)
GLUCOSE BLDC GLUCOMTR-MCNC: 165 MG/DL — HIGH (ref 70–99)
GLUCOSE BLDC GLUCOMTR-MCNC: 214 MG/DL — HIGH (ref 70–99)
GLUCOSE BLDC GLUCOMTR-MCNC: 253 MG/DL — HIGH (ref 70–99)
GLUCOSE BLDC GLUCOMTR-MCNC: 87 MG/DL — SIGNIFICANT CHANGE UP (ref 70–99)
GLUCOSE BLDV-MCNC: 151 MG/DL — HIGH (ref 70–99)
GLUCOSE BLDV-MCNC: 199 MG/DL — HIGH (ref 70–99)
GLUCOSE BLDV-MCNC: 82 MG/DL — SIGNIFICANT CHANGE UP (ref 70–99)
GLUCOSE SERPL-MCNC: 87 MG/DL — SIGNIFICANT CHANGE UP (ref 70–99)
HCO3 BLDV-SCNC: 20 MMOL/L — LOW (ref 21–29)
HCO3 BLDV-SCNC: 23 MMOL/L — SIGNIFICANT CHANGE UP (ref 21–29)
HCO3 BLDV-SCNC: 24 MMOL/L — SIGNIFICANT CHANGE UP (ref 21–29)
HCT VFR BLD CALC: 28.2 % — LOW (ref 34.5–45)
HCT VFR BLDA CALC: 30 % — LOW (ref 39–50)
HCT VFR BLDA CALC: 31 % — LOW (ref 39–50)
HCT VFR BLDA CALC: 35 % — LOW (ref 39–50)
HGB BLD CALC-MCNC: 10 G/DL — LOW (ref 11.5–15.5)
HGB BLD CALC-MCNC: 11.2 G/DL — LOW (ref 11.5–15.5)
HGB BLD CALC-MCNC: 9.8 G/DL — LOW (ref 11.5–15.5)
HGB BLD-MCNC: 9.4 G/DL — LOW (ref 11.5–15.5)
HOROWITZ INDEX BLDV+IHG-RTO: 32 — SIGNIFICANT CHANGE UP
LACTATE BLDV-MCNC: 0.9 MMOL/L — SIGNIFICANT CHANGE UP (ref 0.7–2)
LACTATE BLDV-MCNC: 0.9 MMOL/L — SIGNIFICANT CHANGE UP (ref 0.7–2)
LACTATE BLDV-MCNC: 1.1 MMOL/L — SIGNIFICANT CHANGE UP (ref 0.7–2)
MAGNESIUM SERPL-MCNC: 2 MG/DL — SIGNIFICANT CHANGE UP (ref 1.6–2.6)
MCHC RBC-ENTMCNC: 27.3 PG — SIGNIFICANT CHANGE UP (ref 27–34)
MCHC RBC-ENTMCNC: 33.3 GM/DL — SIGNIFICANT CHANGE UP (ref 32–36)
MCV RBC AUTO: 82 FL — SIGNIFICANT CHANGE UP (ref 80–100)
NRBC # BLD: 0 /100 WBCS — SIGNIFICANT CHANGE UP (ref 0–0)
OTHER CELLS CSF MANUAL: 7 ML/DL — LOW (ref 18–22)
OTHER CELLS CSF MANUAL: 8 ML/DL — LOW (ref 18–22)
PCO2 BLDV: 30 MMHG — LOW (ref 35–50)
PCO2 BLDV: 34 MMHG — LOW (ref 35–50)
PCO2 BLDV: 34 MMHG — LOW (ref 35–50)
PH BLDV: 7.44 — SIGNIFICANT CHANGE UP (ref 7.35–7.45)
PH BLDV: 7.44 — SIGNIFICANT CHANGE UP (ref 7.35–7.45)
PH BLDV: 7.46 — HIGH (ref 7.35–7.45)
PHOSPHATE SERPL-MCNC: 2.3 MG/DL — LOW (ref 2.5–4.5)
PLATELET # BLD AUTO: 164 K/UL — SIGNIFICANT CHANGE UP (ref 150–400)
PO2 BLDV: 30 MMHG — SIGNIFICANT CHANGE UP (ref 25–45)
PO2 BLDV: 32 MMHG — SIGNIFICANT CHANGE UP (ref 25–45)
PO2 BLDV: SIGNIFICANT CHANGE UP MMHG (ref 25–45)
POTASSIUM BLDV-SCNC: 4 MMOL/L — SIGNIFICANT CHANGE UP (ref 3.5–5.3)
POTASSIUM BLDV-SCNC: 4.1 MMOL/L — SIGNIFICANT CHANGE UP (ref 3.5–5.3)
POTASSIUM BLDV-SCNC: SIGNIFICANT CHANGE UP MMOL/L (ref 3.5–5.3)
POTASSIUM SERPL-MCNC: 4.2 MMOL/L — SIGNIFICANT CHANGE UP (ref 3.5–5.3)
POTASSIUM SERPL-SCNC: 4.2 MMOL/L — SIGNIFICANT CHANGE UP (ref 3.5–5.3)
PROT SERPL-MCNC: 5.1 G/DL — LOW (ref 6–8.3)
RBC # BLD: 3.44 M/UL — LOW (ref 3.8–5.2)
RBC # FLD: 16 % — HIGH (ref 10.3–14.5)
SAO2 % BLDV: 53 % — LOW (ref 67–88)
SAO2 % BLDV: 55 % — LOW (ref 67–88)
SAO2 % BLDV: 59 % — LOW (ref 67–88)
SODIUM SERPL-SCNC: 135 MMOL/L — SIGNIFICANT CHANGE UP (ref 135–145)
WBC # BLD: 11.27 K/UL — HIGH (ref 3.8–10.5)
WBC # FLD AUTO: 11.27 K/UL — HIGH (ref 3.8–10.5)

## 2019-10-30 PROCEDURE — 99223 1ST HOSP IP/OBS HIGH 75: CPT

## 2019-10-30 PROCEDURE — 71045 X-RAY EXAM CHEST 1 VIEW: CPT | Mod: 26,77

## 2019-10-30 PROCEDURE — 71045 X-RAY EXAM CHEST 1 VIEW: CPT | Mod: 26,59

## 2019-10-30 PROCEDURE — 99291 CRITICAL CARE FIRST HOUR: CPT

## 2019-10-30 RX ORDER — DEXTROSE 50 % IN WATER 50 %
15 SYRINGE (ML) INTRAVENOUS ONCE
Refills: 0 | Status: DISCONTINUED | OUTPATIENT
Start: 2019-10-30 | End: 2019-11-08

## 2019-10-30 RX ORDER — INSULIN LISPRO 100/ML
7 VIAL (ML) SUBCUTANEOUS
Refills: 0 | Status: DISCONTINUED | OUTPATIENT
Start: 2019-10-30 | End: 2019-11-01

## 2019-10-30 RX ORDER — ACETAMINOPHEN 500 MG
1000 TABLET ORAL ONCE
Refills: 0 | Status: COMPLETED | OUTPATIENT
Start: 2019-10-30 | End: 2019-10-30

## 2019-10-30 RX ORDER — OXYCODONE HYDROCHLORIDE 5 MG/1
5 TABLET ORAL EVERY 4 HOURS
Refills: 0 | Status: DISCONTINUED | OUTPATIENT
Start: 2019-10-30 | End: 2019-10-30

## 2019-10-30 RX ORDER — INSULIN LISPRO 100/ML
VIAL (ML) SUBCUTANEOUS AT BEDTIME
Refills: 0 | Status: DISCONTINUED | OUTPATIENT
Start: 2019-10-30 | End: 2019-11-08

## 2019-10-30 RX ORDER — INSULIN LISPRO 100/ML
4 VIAL (ML) SUBCUTANEOUS
Refills: 0 | Status: DISCONTINUED | OUTPATIENT
Start: 2019-10-30 | End: 2019-10-30

## 2019-10-30 RX ORDER — DEXTROSE 10 % IN WATER 10 %
125 INTRAVENOUS SOLUTION INTRAVENOUS ONCE
Refills: 0 | Status: DISCONTINUED | OUTPATIENT
Start: 2019-10-30 | End: 2019-11-08

## 2019-10-30 RX ORDER — SODIUM CHLORIDE 9 MG/ML
1000 INJECTION, SOLUTION INTRAVENOUS
Refills: 0 | Status: DISCONTINUED | OUTPATIENT
Start: 2019-10-30 | End: 2019-11-08

## 2019-10-30 RX ORDER — WARFARIN SODIUM 2.5 MG/1
5 TABLET ORAL ONCE
Refills: 0 | Status: COMPLETED | OUTPATIENT
Start: 2019-10-30 | End: 2019-10-31

## 2019-10-30 RX ORDER — GLUCAGON INJECTION, SOLUTION 0.5 MG/.1ML
1 INJECTION, SOLUTION SUBCUTANEOUS ONCE
Refills: 0 | Status: DISCONTINUED | OUTPATIENT
Start: 2019-10-30 | End: 2019-11-08

## 2019-10-30 RX ORDER — INSULIN LISPRO 100/ML
VIAL (ML) SUBCUTANEOUS
Refills: 0 | Status: DISCONTINUED | OUTPATIENT
Start: 2019-10-30 | End: 2019-11-08

## 2019-10-30 RX ORDER — DEXTROSE 10 % IN WATER 10 %
250 INTRAVENOUS SOLUTION INTRAVENOUS ONCE
Refills: 0 | Status: DISCONTINUED | OUTPATIENT
Start: 2019-10-30 | End: 2019-11-08

## 2019-10-30 RX ORDER — INSULIN GLARGINE 100 [IU]/ML
16 INJECTION, SOLUTION SUBCUTANEOUS AT BEDTIME
Refills: 0 | Status: DISCONTINUED | OUTPATIENT
Start: 2019-10-30 | End: 2019-11-01

## 2019-10-30 RX ORDER — POLYETHYLENE GLYCOL 3350 17 G/17G
17 POWDER, FOR SOLUTION ORAL DAILY
Refills: 0 | Status: DISCONTINUED | OUTPATIENT
Start: 2019-10-30 | End: 2019-11-08

## 2019-10-30 RX ADMIN — OXYCODONE HYDROCHLORIDE 5 MILLIGRAM(S): 5 TABLET ORAL at 15:44

## 2019-10-30 RX ADMIN — OXYCODONE HYDROCHLORIDE 5 MILLIGRAM(S): 5 TABLET ORAL at 19:49

## 2019-10-30 RX ADMIN — PANTOPRAZOLE SODIUM 40 MILLIGRAM(S): 20 TABLET, DELAYED RELEASE ORAL at 06:44

## 2019-10-30 RX ADMIN — OXYCODONE HYDROCHLORIDE 5 MILLIGRAM(S): 5 TABLET ORAL at 20:19

## 2019-10-30 RX ADMIN — Medication 400 MILLIGRAM(S): at 15:06

## 2019-10-30 RX ADMIN — Medication 7 UNIT(S): at 17:13

## 2019-10-30 RX ADMIN — Medication 3: at 12:56

## 2019-10-30 RX ADMIN — CHLORHEXIDINE GLUCONATE 1 APPLICATION(S): 213 SOLUTION TOPICAL at 07:35

## 2019-10-30 RX ADMIN — Medication 40 MILLIGRAM(S): at 06:44

## 2019-10-30 RX ADMIN — Medication 2: at 17:13

## 2019-10-30 RX ADMIN — AMIODARONE HYDROCHLORIDE 200 MILLIGRAM(S): 400 TABLET ORAL at 06:44

## 2019-10-30 RX ADMIN — Medication 1000 MILLIGRAM(S): at 15:08

## 2019-10-30 RX ADMIN — Medication 81 MILLIGRAM(S): at 12:55

## 2019-10-30 RX ADMIN — OXYCODONE HYDROCHLORIDE 5 MILLIGRAM(S): 5 TABLET ORAL at 16:44

## 2019-10-30 RX ADMIN — Medication 7 UNIT(S): at 12:56

## 2019-10-30 RX ADMIN — HEPARIN SODIUM 9 UNIT(S)/HR: 5000 INJECTION INTRAVENOUS; SUBCUTANEOUS at 20:38

## 2019-10-30 RX ADMIN — SPIRONOLACTONE 25 MILLIGRAM(S): 25 TABLET, FILM COATED ORAL at 06:44

## 2019-10-30 NOTE — PROGRESS NOTE ADULT - ASSESSMENT
Assessment  DMT2: 63y Female with DM T2 with hyperglycemia, A1C 7.5%, was on oral meds, now S/P CABG on insulin, blood sugars improving, trending within acceptable range, no hypoglycemic episode, patient now eating full meals, appears comfortable.  CAD: S/P CABG 10/25, on medications, no chest pain, stable, monitored.  HTN: Controlled,  on antihypertensive medications.  HLD: On statin, compliant with  intake.          Mireya Niño MD  Cell: [phone]  Office: [phone] Assessment  DMT2: 63y Female with DM T2 with hyperglycemia, A1C 7.5%, was on oral meds, now S/P CABG on insulin, blood sugars improving, trending within acceptable range,  no hypoglycemic episode, patient now eating full meals, appears comfortable.  CAD: S/P CABG 10/25, on medications, no chest pain, stable, monitored.  HTN: Controlled,  on antihypertensive medications.  HLD: On statin, compliant with  intake.          Mireya Niño MD  Cell: [phone]  Office: [phone]

## 2019-10-30 NOTE — PROGRESS NOTE ADULT - PROBLEM SELECTOR PLAN 1
Will d/c IV insulin.  Will increase Lantus to 16u at bedtime, start patient on 7u Humalog before each meal, and continue Humalog correction scale coverage. Will continue monitoring FS, log, and FU.  Patient counseled for compliance with consistent low carb diet and exercise as tolerated outpatient. Patient counseled for compliance with consistent low carb diet and exercise as tolerated outpatient.

## 2019-10-30 NOTE — PROGRESS NOTE ADULT - ASSESSMENT
ASSESSMENT  63F POD4 from CABG x4 (BIMA, RSVG, L radial) and MR, had had IABP placed preop which was removed yesterday, found to have coolness and complaints of numbness in RLE this morning    - Recommend continuing anticoagulation - heparin gtt with goal PTT of 70  - Cont Antiplatelet and statin therapy  - Continue vascular checks  - Care per CTsx and CTICU  - Discussed with Dr. Suarez    Vascular Surgery Team  [id number]

## 2019-10-30 NOTE — PROGRESS NOTE ADULT - SUBJECTIVE AND OBJECTIVE BOX
VITAL SIGNS    Telemetry:    Vital Signs Last 24 Hrs  T(C): 36.7 (10-30-19 @ 15:07), Max: 38.1 (10-29-19 @ 20:00)  T(F): 98.1 (10-30-19 @ 15:07), Max: 100.6 (10-29-19 @ 20:00)  HR: 81 (10-30-19 @ 15:07) (67 - 91)  BP: 148/70 (10-30-19 @ 15:07) (96/56 - 155/82)  RR: 20 (10-30-19 @ 15:07) (10 - 36)  SpO2: 100% (10-30-19 @ 15:07) (98% - 100%)                       9.4<L>                135  | 22   | 35<H>        11.27<H> >-----------< 164     ------------------------< 87                    28.2<L>                4.2  | 106  | 1.18                                                                      Ca 7.5<L> Mg 2.0   Ph 2.3<L>    ,             9.7<L>                139  | 21<L>| 40<H>        15.18<H> >-----------< 122<L>   ------------------------< 131<H>                 28.9<L>                4.0  | 108  | 1.63<H>                                                                     Ca 7.5<L> Mg 2.4   Ph 2.5              10-29-19 @ 07:01  -  10-30-19 @ 07:00  --------------------------------------------------------  IN: 1921.3 mL / OUT: 2885 mL / NET: -963.7 mL    10-30-19 @ 07:01  -  10-30-19 @ 15:45  --------------------------------------------------------  IN: 384 mL / OUT: 170 mL / NET: 214 mL    10-30 @ 05:39  PT-- INR--  PTT77.5  10-29 @ 22:59  PT-- INR--  PTT66.9  10-29 @ 16:34  PT14.1 INR1.23  PTT53.3  10-29 @ 06:25  PT-- INR--  PTT52.8  10-29 @ 00:22  PT15.1 INR1.30  PTT62.7  10-28 @ 14:25  PT15.2 INR1.31  PTT54.5  10-27 @ 22:04  PT-- INR--  PTT79.4  10-27 @ 16:23  PT16.1 INR1.40  PTT95.2  10-27 @ 07:08  PT-- INR--  PTT81.8  10-27 @ 01:01  PT16.2 INR1.39  PTT36.8  10-26 @ 18:11  PT-- INR--  PTT36.3  10-26 @ 00:47  PT13.6 INR1.19  PTT36.3      Daily     Daily Weight in k (30 Oct 2019 00:00)        CAPILLARY BLOOD GLUCOSE  253 (30 Oct 2019 12:00)  107 (30 Oct 2019 08:00)  212 (29 Oct 2019 19:00)  163 (29 Oct 2019 18:00)  158 (29 Oct 2019 17:00)  162 (29 Oct 2019 16:00)      POCT Blood Glucose.: 253 mg/dL (30 Oct 2019 12:48)  POCT Blood Glucose.: 107 mg/dL (30 Oct 2019 08:23)  POCT Blood Glucose.: 132 mg/dL (30 Oct 2019 07:21)  POCT Blood Glucose.: 138 mg/dL (30 Oct 2019 06:15)  POCT Blood Glucose.: 165 mg/dL (30 Oct 2019 04:24)  POCT Blood Glucose.: 104 mg/dL (30 Oct 2019 02:32)  POCT Blood Glucose.: 87 mg/dL (30 Oct 2019 01:17)  POCT Blood Glucose.: 117 mg/dL (30 Oct 2019 00:07)  POCT Blood Glucose.: 195 mg/dL (29 Oct 2019 22:14)  POCT Blood Glucose.: 227 mg/dL (29 Oct 2019 21:42)  POCT Blood Glucose.: 212 mg/dL (29 Oct 2019 18:52)  POCT Blood Glucose.: 158 mg/dL (29 Oct 2019 16:19)                Coumadin    [ ] YES          [  ]      NO                                   PHYSICAL EXAM        Neurology: alert and oriented x 3, nonfocal, no gross deficits  CV : .S1S2 RRR  Sternal Wound :  CDI , Stable  Pacing Wires        [  ]   Settings:                                  Isolated  [  ]  Lungs: bibasilar crackles   Drains:     MS         [  ] Drainage:                 L Pleural  [  ]  Drainage:                R Pleural  [  ]  Drainage:  Abdomen: soft, nontender, nondistended, positive bowel sounds, last bowel movement   :         voiding    Extremities:     __ edema, ___calf tenderness.                            __svg site cdi          aMIOdarone    Tablet 200 milliGRAM(s) Oral daily  aspirin  chewable 81 milliGRAM(s) Oral daily  atorvastatin 80 milliGRAM(s) Oral at bedtime  chlorhexidine 2% Cloths 1 Application(s) Topical <User Schedule>  dextrose 10% Bolus 125 milliLiter(s) IV Bolus once  dextrose 10% Bolus 250 milliLiter(s) IV Bolus once  dextrose 40% Gel 15 Gram(s) Oral once PRN  dextrose 5%. 1000 milliLiter(s) IV Continuous <Continuous>  dextrose 50% Injectable 50 milliLiter(s) IV Push every 15 minutes  dextrose 50% Injectable 25 milliLiter(s) IV Push every 15 minutes  DOBUTamine Infusion 2 MICROgram(s)/kG/Min IV Continuous <Continuous>  furosemide    Tablet 40 milliGRAM(s) Oral daily  glucagon  Injectable 1 milliGRAM(s) IntraMuscular once PRN  heparin  Infusion 800 Unit(s)/Hr IV Continuous <Continuous>  insulin glargine Injectable (LANTUS) 16 Unit(s) SubCutaneous at bedtime  insulin lispro (HumaLOG) corrective regimen sliding scale   SubCutaneous three times a day before meals  insulin lispro (HumaLOG) corrective regimen sliding scale   SubCutaneous at bedtime  insulin lispro Injectable (HumaLOG) 7 Unit(s) SubCutaneous three times a day before meals  oxyCODONE    IR 5 milliGRAM(s) Oral every 4 hours PRN  pantoprazole    Tablet 40 milliGRAM(s) Oral before breakfast  polyethylene glycol 3350 17 Gram(s) Oral daily  spironolactone 25 milliGRAM(s) Oral daily  warfarin 5 milliGRAM(s) Oral once                    Physical Therapy Rec:   Home  [  ]   Home w/ PT  [  ]  Rehab  [  ]  Discussed with Cardiothoracic Team at AM rounds. VITAL SIGNS    Telemetry:  nsr 78  Vital Signs Last 24 Hrs  T(C): 36.7 (10-30-19 @ 15:07), Max: 38.1 (10-29-19 @ 20:00)  T(F): 98.1 (10-30-19 @ 15:07), Max: 100.6 (10-29-19 @ 20:00)  HR: 81 (10-30-19 @ 15:07) (67 - 91)  BP: 148/70 (10-30-19 @ 15:07) (96/56 - 155/82)  RR: 20 (10-30-19 @ 15:07) (10 - 36)  SpO2: 100% (10-30-19 @ 15:07) (98% - 100%)                       9.4<L>                135  | 22   | 35<H>        11.27<H> >-----------< 164     ------------------------< 87                    28.2<L>                4.2  | 106  | 1.18                                                                      Ca 7.5<L> Mg 2.0   Ph 2.3<L>    ,             9.7<L>                139  | 21<L>| 40<H>        15.18<H> >-----------< 122<L>   ------------------------< 131<H>                 28.9<L>                4.0  | 108  | 1.63<H>                                                                     Ca 7.5<L> Mg 2.4   Ph 2.5              10-29-19 @ 07:01  -  10-30-19 @ 07:00  --------------------------------------------------------  IN: 1921.3 mL / OUT: 2885 mL / NET: -963.7 mL    10-30-19 @ 07:01  -  10-30-19 @ 15:45  --------------------------------------------------------  IN: 384 mL / OUT: 170 mL / NET: 214 mL    10-30 @ 05:39  PT-- INR--  PTT77.5  10-29 @ 22:59  PT-- INR--  PTT66.9  10-29 @ 16:34  PT14.1 INR1.23  PTT53.3  10-29 @ 06:25  PT-- INR--  PTT52.8  10-29 @ 00:22  PT15.1 INR1.30  PTT62.7  10-28 @ 14:25  PT15.2 INR1.31  PTT54.5  10-27 @ 22:04  PT-- INR--  PTT79.4  10-27 @ 16:23  PT16.1 INR1.40  PTT95.2  10-27 @ 07:08  PT-- INR--  PTT81.8  10-27 @ 01:01  PT16.2 INR1.39  PTT36.8  10-26 @ 18:11  PT-- INR--  PTT36.3  10-26 @ 00:47  PT13.6 INR1.19  PTT36.3      Daily     Daily Weight in k (30 Oct 2019 00:00)        CAPILLARY BLOOD GLUCOSE  253 (30 Oct 2019 12:00)  107 (30 Oct 2019 08:00)  212 (29 Oct 2019 19:00)  163 (29 Oct 2019 18:00)  158 (29 Oct 2019 17:00)  162 (29 Oct 2019 16:00)      POCT Blood Glucose.: 253 mg/dL (30 Oct 2019 12:48)  POCT Blood Glucose.: 107 mg/dL (30 Oct 2019 08:23)  POCT Blood Glucose.: 132 mg/dL (30 Oct 2019 07:21)  POCT Blood Glucose.: 138 mg/dL (30 Oct 2019 06:15)  POCT Blood Glucose.: 165 mg/dL (30 Oct 2019 04:24)  POCT Blood Glucose.: 104 mg/dL (30 Oct 2019 02:32)  POCT Blood Glucose.: 87 mg/dL (30 Oct 2019 01:17)  POCT Blood Glucose.: 117 mg/dL (30 Oct 2019 00:07)  POCT Blood Glucose.: 195 mg/dL (29 Oct 2019 22:14)  POCT Blood Glucose.: 227 mg/dL (29 Oct 2019 21:42)  POCT Blood Glucose.: 212 mg/dL (29 Oct 2019 18:52)  POCT Blood Glucose.: 158 mg/dL (29 Oct 2019 16:19)                Coumadin    [ ] YES          [x  ]      NO                                   PHYSICAL EXAM        Neurology: alert and oriented x 3, nonfocal, no gross deficits  CV : .S1S2 RRR  Sternal Wound :  CDI , Stable  Pacing Wires        [ x ]   Settings:   vvi                               Isolated  [  ]  Lungs: bibasilar crackles   Drains:     MS         [  ] Drainage:                 L Pleural  [ x ]  Drainage:                 Abdomen: soft, nontender, nondistended, positive bowel sounds, last bowel movement  +  :         voiding    Extremities:     _-_ edema, _-__calf tenderness.                            r__svg site cdi          aMIOdarone    Tablet 200 milliGRAM(s) Oral daily  aspirin  chewable 81 milliGRAM(s) Oral daily  atorvastatin 80 milliGRAM(s) Oral at bedtime  chlorhexidine 2% Cloths 1 Application(s) Topical <User Schedule>  dextrose 10% Bolus 125 milliLiter(s) IV Bolus once  dextrose 10% Bolus 250 milliLiter(s) IV Bolus once  dextrose 40% Gel 15 Gram(s) Oral once PRN  dextrose 5%. 1000 milliLiter(s) IV Continuous <Continuous>  dextrose 50% Injectable 50 milliLiter(s) IV Push every 15 minutes  dextrose 50% Injectable 25 milliLiter(s) IV Push every 15 minutes  DOBUTamine Infusion 2 MICROgram(s)/kG/Min IV Continuous <Continuous>  furosemide    Tablet 40 milliGRAM(s) Oral daily  glucagon  Injectable 1 milliGRAM(s) IntraMuscular once PRN  heparin  Infusion 800 Unit(s)/Hr IV Continuous <Continuous>  insulin glargine Injectable (LANTUS) 16 Unit(s) SubCutaneous at bedtime  insulin lispro (HumaLOG) corrective regimen sliding scale   SubCutaneous three times a day before meals  insulin lispro (HumaLOG) corrective regimen sliding scale   SubCutaneous at bedtime  insulin lispro Injectable (HumaLOG) 7 Unit(s) SubCutaneous three times a day before meals  oxyCODONE    IR 5 milliGRAM(s) Oral every 4 hours PRN  pantoprazole    Tablet 40 milliGRAM(s) Oral before breakfast  polyethylene glycol 3350 17 Gram(s) Oral daily  spironolactone 25 milliGRAM(s) Oral daily  warfarin 5 milliGRAM(s) Oral once                    Physical Therapy Rec:   Home  [  ]   Home w/ PT  [  ]  Rehab  [  ]  Discussed with Cardiothoracic Team at AM rounds.

## 2019-10-30 NOTE — BEHAVIORAL HEALTH ASSESSMENT NOTE - RISK ASSESSMENT
Low Acute Suicide Risk The patient denies any current, or past SI/HI, or substance use. She has strong family support, living with her daughters family and being supported in the hospital by another one of her daughters who lives locally. Patient has low acute suicide risk due to protective factors and does not warrant inpatient psychiatric care.

## 2019-10-30 NOTE — BEHAVIORAL HEALTH ASSESSMENT NOTE - SUMMARY
This is a is a 64 yo [place] female domiciled with her daughters family, retired , with no PPHx, no substance abuse issues, and a PMH significant for DM, hyperlipidemia and CVA (requiring cane to ambulate, but no focal deficits) who presented to [facility] (10/20/19) with a several hour history of shortness of breath and abdominal pain, transferred to [facility] for HLOC given concern for NSTEMI. She underwent CABG x4 (BIMA, RSVG, L radial) on 10/25.  Psychiatry was consulted on 10/30 for hallucinations and confusion, requesting medication recommendation. Upon consultation, the patient is A&Ox4, recalls her outbursts citing frustration as the reason, but denies ever experiencing hallucinations.  Nursing notes some confusion yesterday, frequently calling out, anxious, also concern by family for hallucinations but awaiting callback from them to confirm.

## 2019-10-30 NOTE — PROGRESS NOTE ADULT - SUBJECTIVE AND OBJECTIVE BOX
Vascular Surgery Progress Note    SUBJECTIVE: Patient seen and examined at the bedside. Feeling well this morning. States that her RLE has felt better and the numbness has subsided since starting the heparin gtt.    VITALS  T(C): 36.3 (10-30-19 @ 09:30), Max: 38.1 (10-29-19 @ 15:00)  HR: 73 (10-30-19 @ 11:00) (67 - 92)  BP: 111/73 (10-30-19 @ 11:00) (96/56 - 176/79)  RR: 33 (10-30-19 @ 11:00) (10 - 36)  SpO2: 100% (10-30-19 @ 11:00) (98% - 100%)  CAPILLARY BLOOD GLUCOSE  107 (30 Oct 2019 08:00)  212 (29 Oct 2019 19:00)  163 (29 Oct 2019 18:00)  158 (29 Oct 2019 17:00)  162 (29 Oct 2019 16:00)  222 (29 Oct 2019 15:00)  262 (29 Oct 2019 14:00)  268 (29 Oct 2019 13:00)  277 (29 Oct 2019 12:00)      POCT Blood Glucose.: 107 mg/dL (30 Oct 2019 08:23)  POCT Blood Glucose.: 132 mg/dL (30 Oct 2019 07:21)  POCT Blood Glucose.: 138 mg/dL (30 Oct 2019 06:15)  POCT Blood Glucose.: 165 mg/dL (30 Oct 2019 04:24)  POCT Blood Glucose.: 104 mg/dL (30 Oct 2019 02:32)  POCT Blood Glucose.: 87 mg/dL (30 Oct 2019 01:17)  POCT Blood Glucose.: 117 mg/dL (30 Oct 2019 00:07)  POCT Blood Glucose.: 195 mg/dL (29 Oct 2019 22:14)  POCT Blood Glucose.: 227 mg/dL (29 Oct 2019 21:42)  POCT Blood Glucose.: 212 mg/dL (29 Oct 2019 18:52)  POCT Blood Glucose.: 158 mg/dL (29 Oct 2019 16:19)  POCT Blood Glucose.: 221 mg/dL (29 Oct 2019 14:51)  POCT Blood Glucose.: 262 mg/dL (29 Oct 2019 13:41)  POCT Blood Glucose.: 277 mg/dL (29 Oct 2019 11:58)    Is/Os    10-29 @ 07:01  -  10-30 @ 07:00  --------------------------------------------------------  IN:    dexmedetomidine Infusion: 61.3 mL    DOBUTamine Infusion: 84 mL    heparin Infusion: 183 mL    heparin Infusion: 8 mL    insulin regular Infusion: 65 mL    Oral Fluid: 980 mL    sodium chloride 0.9%: 70 mL    sodium chloride 0.9%: 470 mL  Total IN: 1921.3 mL    OUT:    Bulb: 50 mL    Chest Tube: 70 mL    Chest Tube: 210 mL    Indwelling Catheter - Urethral: 2555 mL  Total OUT: 2885 mL    Total NET: -963.7 mL      10-30 @ 07:01  -  10-30 @ 11:23  --------------------------------------------------------  IN:    DOBUTamine Infusion: 10.5 mL    heparin Infusion: 25.5 mL    insulin regular Infusion: 2 mL    Packed Red Blood Cells: 300 mL    sodium chloride 0.9%: 10 mL  Total IN: 348 mL    OUT:    Chest Tube: 40 mL    Chest Tube: 30 mL  Total OUT: 70 mL    Total NET: 278 mL        PHYSICAL EXAM:   General: NAD, Lying in bed comfortably, alert, oriented x3  Chest/Respiratory: nonlabored on RA, Sternal dressings are clean and dry  CVS: regular rate and rhythm  Abdomen: soft, nontender, nondistended  Extremities: no edema, Dressings to RLE are clean and dry. IABP access site in R groin is dry and hemostatic, mildly tender, DP and PT signals on RLE, palpable DP and PT pulses on LLE.    LABS  CBC (10-30 @ 02:22)                              9.4<L>                         11.27<H>  )----------------(  164        --    % Neutrophils, --    % Lymphocytes, ANC: --                                  28.2<L>  CBC (10-29 @ 00:22)                              9.7<L>                         15.18<H>  )----------------(  122<L>     --    % Neutrophils, --    % Lymphocytes, ANC: --                                  28.9<L>    BMP (10-30 @ 02:22)             135     |  106     |  35<H> 		Ca++ --      Ca 7.5<L>             ---------------------------------( 87    		Mg 2.0                4.2     |  22      |  1.18  			Ph 2.3<L>  BMP (10-29 @ 00:22)             139     |  108     |  40<H> 		Ca++ --      Ca 7.5<L>             ---------------------------------( 131<H>		Mg 2.4                4.0     |  21<L>   |  1.63<H>			Ph 2.5       LFTs (10-30 @ 02:22)      TPro 5.1<L> / Alb 2.4<L> / TBili 0.6 / DBili -- / AST 26 / ALT 42 / AlkPhos 94  LFTs (10-29 @ 00:22)      TPro 5.2<L> / Alb 2.8<L> / TBili 0.8 / DBili -- / AST 44<H> / ALT 58<H> / AlkPhos 87    Coags (10-30 @ 05:39)  aPTT 77.5<H> / INR -- / PT --  Coags (10-29 @ 22:59)  aPTT 66.9<H> / INR -- / PT --        VBG (10-30 @ 10:34)     7.44 / 30<L> / -- / 20<L> / -2.4<L> / 59<L>%     Lactate: 0.9  VBG (10-30 @ 05:38)     7.44 / 34<L> / 32 / 23 / -0.6 / 55<L>%     Lactate: 1.1    IMAGING STUDIES  < from: CT Angio Abd Aorta w/run-off w/ IV Cont (10.29.19 @ 16:01) >  FINDINGS:    CENTRAL ARTERIAL SYSTEM:     Abdominal aorta is patent with mild atherosclerotic change. No abdominal   aortic aneurysm. Celiac axis, SMA and DARLENE are patent as are the renal   arteries.    RIGHTLOWER EXTREMITY:    Right common and external iliac arteries are widely patent. A 6 mm linear   filling defect is noted within the right common femoral artery extending   into the proximal portion of the right femoral artery consistent with   thrombus. Profunda femoris is patent. Remainder of the femoral artery is   patent. Popliteal artery is patent. Short segment occlusion of the   proximal anterior tibial artery and tibioperoneal trunk secondary to   embolic disease with prompt reconstitution.Three-vessel runoff to the   ankle.    LEFT LOWER EXTREMITY:    Left common and external iliac arteries are widely patent as is the left   common femoral artery. Left femoral artery and profunda femoris are   widely patent as is the popliteal artery.Three-vessel runoff is present   to the ankle.    ADDITIONAL FINDINGS: Status post sternotomy. Trace bilateral pleural   effusions with associated compressive atelectasis. Bilateral chest tubes.   Pericardial drain.    IMPRESSION:     Short segment occlusion of the proximal right anterior tibial artery and   tibioperoneal trunk secondary to embolic disease with prompt   reconstitution. Three-vessel runoff to the ankle.    An additional 6 mm linear thrombus within the right common   femoral/proximal right femoral artery.      < end of copied text >

## 2019-10-30 NOTE — BEHAVIORAL HEALTH ASSESSMENT NOTE - NSBHCONSULTMEDS_PSY_A_CORE FT
1. Start Melatonin 3mg PO qHS    2. PRN: Valproic Acid 125mg IV q8h PRN severe agitation.      3. Check: TSH, B12, RPR    4. Minimize use of benzos, opioids, anticholinergics, or other deliriogenic agents when possible.  Maintain sleep wake cycle.  Provide frequent reorientation and redirection.  Family member at bedside if possible. Assess for need for glasses and hearing aid (if applicable).    5. Pt does not meet criteria for psychiatric hospitalization.  Recommend outpt psych f/u at [facility] after d/c- [phone].   [facility] Psych will follow.

## 2019-10-30 NOTE — PROGRESS NOTE ADULT - ASSESSMENT
Pt is a 62 yo female with DM, HTN, and CVA (requiring cane to ambulate, but no focal deficits)who presented to [facility] with a several hour history of shortness of breath and abdominal pain. Patient states these symptoms started around 4 pm yesterday to this. Prior to this day, patient denies having any similar symptoms prior to yesterday. She had an EKG at [facility] that showed ALAN in lead III and ST depressions in V2-V5. The troponing at the OSH was 1.15, nd propBNP of 4000. She was given lasix, ASA/Brillinta loaded, and started on heparin drip. She was transferred to [facility] for HLOC given concern for NSTEMI.  s/p 10/22 LHC demonstrating severe 3VD and CT Surgery consulted for CABG evaluation.  Endo for glucose control  10/25 Clipping, left atrial appendage  MVR (mitral valve replacement) 31mm tissue valve   CABG x 4  LIMA to OM, DELIA to LAD, SVG to PRICE, Radial artery Y graft to PDA off of SVG   pre-op IABP   Post op pressors, inotropes  Cardiogenic shock  VETO  Ventricular ectopy- amio load  Remains on dobutamine, poor CI/vbg when weaned off.  Acutee blood loss anemia prbc  RUE midline for access  Psych for delerium- a&o- anxiety  R foot ischemia/cold limb s/p CTA which shows small area of low flow - on Heparin gtt, vascular is following, will manage medically; starting Coumadin Pt is a 64 yo female with DM, HTN, and CVA (requiring cane to ambulate, but no focal deficits)who presented to [facility] with a several hour history of shortness of breath and abdominal pain. Patient states these symptoms started around 4 pm yesterday to this. Prior to this day, patient denies having any similar symptoms prior to yesterday. She had an EKG at [facility] that showed ALAN in lead III and ST depressions in V2-V5. The troponing at the OSH was 1.15, nd propBNP of 4000. She was given lasix, ASA/Brillinta loaded, and started on heparin drip. She was transferred to [facility] for HLOC given concern for NSTEMI.  s/p 10/22 LHC demonstrating severe 3VD and CT Surgery consulted for CABG evaluation.  Endo for glucose control  10/25 Clipping, left atrial appendage  MVR (mitral valve replacement) 31mm tissue valve   CABG x 4  LIMA to OM, DELIA to LAD, SVG to PRICE, Radial artery Y graft to PDA off of SVG   pre-op IABP   Post op pressors, inotropes  Cardiogenic shock  VETO  Ventricular ectopy- amio load  Remains on dobutamine, poor CI/vbg when weaned off.  Acutee blood loss anemia prbc  RUE midline for access  Psych for delerium- a&o- anxiety  R foot ischemia/cold limb s/p CTA which shows small area of low flow - on Heparin gtt, vascular is following, will manage medically; starting Coumadin  Remains on  2mcg

## 2019-10-30 NOTE — BEHAVIORAL HEALTH ASSESSMENT NOTE - NSBHMEDSOTHERFT_PSY_A_CORE
amLODIPine 5 mg oral tablet: Last Dose Taken:  , 1 tab(s) orally once a day  Aspirin 81 oral delayed release tablet: Last Dose Taken:  , 1 tab(s) orally once a day  atorvastatin 80 mg oral tablet: Last Dose Taken:  , 1 tab(s) orally once a day  glyburide-metformin 5 mg-500 mg oral tablet: Last Dose Taken:  , 1 tab(s) orally 2 times a day  lisinopril 10 mg oral tablet: Last Dose Taken:  , 1 tab(s) orally once a day

## 2019-10-30 NOTE — BEHAVIORAL HEALTH ASSESSMENT NOTE - NSBHCHARTREVIEWLAB_PSY_A_CORE FT
9.4    11.27 )-----------( 164      ( 30 Oct 2019 02:22 )             28.2     10-30    135  |  106  |  35<H>  ----------------------------<  87  4.2   |  22  |  1.18    Ca    7.5<L>      30 Oct 2019 02:22  Phos  2.3     10-30  Mg     2.0     10-30    TPro  5.1<L>  /  Alb  2.4<L>  /  TBili  0.6  /  DBili  x   /  AST  26  /  ALT  42  /  AlkPhos  94  10-30

## 2019-10-30 NOTE — PROGRESS NOTE ADULT - ATTENDING COMMENTS
Will d/c IV insulin.  Will increase Lantus to 16u at bedtime, start patient on 7u Humalog before each meal, and continue Humalog correction scale coverage. Will continue monitoring FS, log, and FU.

## 2019-10-30 NOTE — PROGRESS NOTE ADULT - SUBJECTIVE AND OBJECTIVE BOX
AICHA BILLY   MRN#: [id number]     The patient is a 63y Female who was seen, evaluated, & examined with the CTICU staff post-operatively with a multidisciplinary care plan formulated & implemented.  All available clinical, laboratory, radiographic, pharmacologic, and electrocardiographic data were reviewed & analyzed.      The patient was in the CTICU in critical condition secondary to:     persistent cardiopulmonary dysfunction  cardiogenic shock-cardiovascular dysfunction    For support and evaluation & prevention of further decompensation secondary to persistent cardiopulmonary dysfunction and cardiogenic shock-cardiovascular dysfunction, respiratory status required:     supplemental oxygen with nasal cannula  continuous pulse oximetry monitoring  following ABGs with A-line monitoring    Invasive hemodynamic monitoring with     an A-line was required for continuous MAP/BP monitoring     to ensure adequate cardiovascular support and to evaluate for & help prevent decompensation while receiving     blood transfusions  IV Dobutamine infusion    secondary to     cardiogenic shock-cardiovascular dysfunction  acute postoperative blood loss anemia    In addition:  S/p CABG/MVR  On Dobutamine for EF 35-40% with stable hemodynamics  Lactate normal, central venous sat is 55% in the setting of anemia, will transfuse one unit PRBC  R foot ischemia/cold limb s/p CTA which shows small area of low flow - on Heparin gtt, vascular is following, will manage medically; starting Coumadin  ASA, Lipitor, Amio  Diuresing with Lasix/Aldactone    The patient required critical care management and I personally provided 30 minutes of non-continuous care to the patient, excluding separate procedures, in addition to  discussing the patient and plan at length with the CTICU staff and helping coordinate care.

## 2019-10-30 NOTE — BEHAVIORAL HEALTH ASSESSMENT NOTE - COMMENTS ON SUICIDE RISK/PROTECTIVE FACTORS:
She lives with one of her four daughters, and endorses family support, with another daughter caring for her medical needs

## 2019-10-30 NOTE — BEHAVIORAL HEALTH ASSESSMENT NOTE - NSBHCHARTREVIEWIMAGING_PSY_A_CORE FT
CT Head 10/22    INTERPRETATION: INDICATIONS: CAD, MR, CVA 1/2019 right sided weakness     TECHNIQUE: Serial axial images were obtained from the skull base to the   vertex without intravenous contrast.     COMPARISON EXAMINATION: None.     FINDINGS:   VENTRICLES AND SULCI: Slightly disproportionate cerebellar atrophy relative   to the supratentorial compartment.   INTRA-AXIAL: No mass, blood or abnormal attenuation is seen. No evidence of   infarct   EXTRA-AXIAL: No mass or collection is seen.   VISUALIZED SINUSES: Clear.   VISUALIZED MASTOIDS: Clear.   CALVARIUM: Normal.   MISCELLANEOUS: None.     IMPRESSION: Slightly disproportionate cerebellar atrophy relative to the   supratentorial compartment. No evidence of infarct.

## 2019-10-30 NOTE — BEHAVIORAL HEALTH ASSESSMENT NOTE - NSBHCHARTREVIEWVS_PSY_A_CORE FT
Vital Signs Last 24 Hrs  T(C): 37.3 (30 Oct 2019 11:00), Max: 38.1 (29 Oct 2019 15:00)  T(F): 99.1 (30 Oct 2019 11:00), Max: 100.6 (29 Oct 2019 15:00)  HR: 73 (30 Oct 2019 11:00) (67 - 92)  BP: 111/73 (30 Oct 2019 11:00) (96/56 - 176/79)  BP(mean): 84 (30 Oct 2019 11:00) (70 - 113)  RR: 33 (30 Oct 2019 11:00) (10 - 36)  SpO2: 100% (30 Oct 2019 11:00) (98% - 100%)

## 2019-10-30 NOTE — BEHAVIORAL HEALTH ASSESSMENT NOTE - NSBHSOCIALHXDETAILSFT_PSY_A_CORE
Retired  with limited education, immigrated from [place] in 1982, lives with adult child in

## 2019-10-30 NOTE — PROGRESS NOTE ADULT - PROBLEM SELECTOR PLAN 1
Asa, Statin,  Chest PT,  Incentive spirometry, wound care and assessment.  Ambulate   Cont inotropes  Shower pod #5

## 2019-10-30 NOTE — BEHAVIORAL HEALTH ASSESSMENT NOTE - HPI (INCLUDE ILLNESS QUALITY, SEVERITY, DURATION, TIMING, CONTEXT, MODIFYING FACTORS, ASSOCIATED SIGNS AND SYMPTOMS)
This is a is a 64 yo [place] female domiciled with her daughters family, retired , with no PPHx, no substance abuse issues, and a PMH significant for DM, hyperlipidemia and CVA (requiring cane to ambulate, but no focal deficits) who presented to [facility] (10/20/19) with a several hour history of shortness of breath and abdominal pain, transferred to [facility] for HLOC given concern for NSTEMI. She underwent CABG x4 (BIMA, RSVG, L radial) on 10/25.  Psychiatry was consulted on 10/30 for hallucinations and confusion, requesting medication recommendation.     Upon consultation, the patient is A&Ox4, currently cooperative and denying any past hallucinations.  The patient endorses screaming and yelling "Help me! God, someone help me" at a few points of the day yesterday because she was unable to eat due to pending CT, and wanting her IV to be changed.  The patient endorses that she gets frustrated and tends to yell out for help when upset.  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  She endorses not having much of an education as she had to take care of her brother from a young age.  She is supported by her four daughters, one with which she lives in [place], and another who is close by in [place].  During the day she "cook, clean, I do everything" and enjoys passing her time sitting out in the yard.  The patient acknowledges that her outbursts may have been concerning, but endorses that she sometimes gets that way when frustrated or mad.  She denies any SI/HI and substance use. Denies recent or h/o depression/ludy/psychosis.  The patient also denies any previous psychiatric events or any family history of psychiatric concerns.    The nurse overseeing her care reports that the family was concerned by her behavior, requesting the consult.  She was very anxious yesterday, calling out all day and night, seemed somewhat disoriented, doing better today.  Unable to reach family for collateral, message left requesting callback.

## 2019-10-30 NOTE — BEHAVIORAL HEALTH ASSESSMENT NOTE - NSBHCHARTREVIEWINVESTIGATE_PSY_A_CORE FT
10/27 EKG    Ventricular Rate 78 BPM    Atrial Rate 78 BPM    P-R Interval 124 ms    QRS Duration 76 ms    Q-T Interval 436 ms    QTC Calculation(Bezet) 497 ms    P Axis -1 degrees    R Axis -2 degrees    T Axis 42 degrees    Diagnosis Line SINUS RHYTHM WITH OCCASIONAL PREMATURE VENTRICULAR COMPLEXES  INFERIOR INFARCT , AGE UNDETERMINED

## 2019-10-30 NOTE — CHART NOTE - NSCHARTNOTEFT_GEN_A_CORE
63F significant cardiac history, now POD4 from CABG x4 (BIMA, RSVG, L radial) and MVR, she had had IABP placed preop which was removed on 10/28. c/o RLE numbness. CTA 10/29 showed Short segment occlusion of the proximal right anterior tibial artery and tibioperoneal trunk secondary to embolic disease with prompt reconstitution. Three-vessel runoff to the ankle. An additional 6 mm linear thrombus within the right common. Heparin gtt started.     pt seen and examined at beside. RLE pain improved. denies dizziness, SOB, CP or palpitations     PE  Gen: NAD, A&O x 4  Pulm: non-labor breathing, on NC  Heart: RRR  Ext: RLE from toe to ankle cooler than left. + capillary refills. + right femoral pulse.  + DP/PT signal on right, palpalble DP/PT pulse on left.     plan  - continue anticoagulation   - vascular exam   will d/w Vascular team    Jody Pitt PA-C  [id number]

## 2019-10-31 LAB
ALBUMIN SERPL ELPH-MCNC: 3 G/DL — LOW (ref 3.3–5)
ALP SERPL-CCNC: 145 U/L — HIGH (ref 40–120)
ALT FLD-CCNC: 63 U/L — HIGH (ref 10–45)
ANION GAP SERPL CALC-SCNC: 11 MMOL/L — SIGNIFICANT CHANGE UP (ref 5–17)
APTT BLD: 104.4 SEC — HIGH (ref 27.5–36.3)
APTT BLD: 49.5 SEC — HIGH (ref 27.5–36.3)
APTT BLD: 54.7 SEC — HIGH (ref 27.5–36.3)
APTT BLD: 96.2 SEC — HIGH (ref 27.5–36.3)
AST SERPL-CCNC: 40 U/L — SIGNIFICANT CHANGE UP (ref 10–40)
BILIRUB SERPL-MCNC: 0.6 MG/DL — SIGNIFICANT CHANGE UP (ref 0.2–1.2)
BUN SERPL-MCNC: 33 MG/DL — HIGH (ref 7–23)
CALCIUM SERPL-MCNC: 8.3 MG/DL — LOW (ref 8.4–10.5)
CHLORIDE SERPL-SCNC: 105 MMOL/L — SIGNIFICANT CHANGE UP (ref 96–108)
CO2 SERPL-SCNC: 18 MMOL/L — LOW (ref 22–31)
CREAT SERPL-MCNC: 0.86 MG/DL — SIGNIFICANT CHANGE UP (ref 0.5–1.3)
GLUCOSE BLDC GLUCOMTR-MCNC: 113 MG/DL — HIGH (ref 70–99)
GLUCOSE BLDC GLUCOMTR-MCNC: 115 MG/DL — HIGH (ref 70–99)
GLUCOSE BLDC GLUCOMTR-MCNC: 119 MG/DL — HIGH (ref 70–99)
GLUCOSE BLDC GLUCOMTR-MCNC: 123 MG/DL — HIGH (ref 70–99)
GLUCOSE BLDC GLUCOMTR-MCNC: 132 MG/DL — HIGH (ref 70–99)
GLUCOSE BLDC GLUCOMTR-MCNC: 150 MG/DL — HIGH (ref 70–99)
GLUCOSE BLDC GLUCOMTR-MCNC: 177 MG/DL — HIGH (ref 70–99)
GLUCOSE BLDC GLUCOMTR-MCNC: 185 MG/DL — HIGH (ref 70–99)
GLUCOSE BLDC GLUCOMTR-MCNC: 209 MG/DL — HIGH (ref 70–99)
GLUCOSE BLDC GLUCOMTR-MCNC: 222 MG/DL — HIGH (ref 70–99)
GLUCOSE BLDC GLUCOMTR-MCNC: 269 MG/DL — HIGH (ref 70–99)
GLUCOSE BLDC GLUCOMTR-MCNC: 289 MG/DL — HIGH (ref 70–99)
GLUCOSE BLDC GLUCOMTR-MCNC: 323 MG/DL — HIGH (ref 70–99)
GLUCOSE BLDC GLUCOMTR-MCNC: 96 MG/DL — SIGNIFICANT CHANGE UP (ref 70–99)
GLUCOSE SERPL-MCNC: 262 MG/DL — HIGH (ref 70–99)
HCT VFR BLD CALC: 34 % — LOW (ref 34.5–45)
HGB BLD-MCNC: 11.6 G/DL — SIGNIFICANT CHANGE UP (ref 11.5–15.5)
INR BLD: 1.22 RATIO — HIGH (ref 0.88–1.16)
MCHC RBC-ENTMCNC: 28 PG — SIGNIFICANT CHANGE UP (ref 27–34)
MCHC RBC-ENTMCNC: 34.1 GM/DL — SIGNIFICANT CHANGE UP (ref 32–36)
MCV RBC AUTO: 81.9 FL — SIGNIFICANT CHANGE UP (ref 80–100)
NRBC # BLD: 0 /100 WBCS — SIGNIFICANT CHANGE UP (ref 0–0)
PLATELET # BLD AUTO: 250 K/UL — SIGNIFICANT CHANGE UP (ref 150–400)
POTASSIUM SERPL-MCNC: 4.6 MMOL/L — SIGNIFICANT CHANGE UP (ref 3.5–5.3)
POTASSIUM SERPL-SCNC: 4.6 MMOL/L — SIGNIFICANT CHANGE UP (ref 3.5–5.3)
PROT SERPL-MCNC: 5.8 G/DL — LOW (ref 6–8.3)
PROTHROM AB SERPL-ACNC: 14.1 SEC — HIGH (ref 10–12.9)
RBC # BLD: 4.15 M/UL — SIGNIFICANT CHANGE UP (ref 3.8–5.2)
RBC # FLD: 16.3 % — HIGH (ref 10.3–14.5)
SODIUM SERPL-SCNC: 134 MMOL/L — LOW (ref 135–145)
WBC # BLD: 14.8 K/UL — HIGH (ref 3.8–10.5)
WBC # FLD AUTO: 14.8 K/UL — HIGH (ref 3.8–10.5)

## 2019-10-31 PROCEDURE — 71045 X-RAY EXAM CHEST 1 VIEW: CPT | Mod: 26

## 2019-10-31 PROCEDURE — 93306 TTE W/DOPPLER COMPLETE: CPT | Mod: 26

## 2019-10-31 RX ORDER — WARFARIN SODIUM 2.5 MG/1
5 TABLET ORAL ONCE
Refills: 0 | Status: COMPLETED | OUTPATIENT
Start: 2019-10-31 | End: 2019-10-31

## 2019-10-31 RX ORDER — HYDRALAZINE HCL 50 MG
5 TABLET ORAL ONCE
Refills: 0 | Status: COMPLETED | OUTPATIENT
Start: 2019-10-31 | End: 2019-10-31

## 2019-10-31 RX ORDER — LISINOPRIL 2.5 MG/1
5 TABLET ORAL DAILY
Refills: 0 | Status: DISCONTINUED | OUTPATIENT
Start: 2019-10-31 | End: 2019-11-08

## 2019-10-31 RX ORDER — INSULIN HUMAN 100 [IU]/ML
2 INJECTION, SOLUTION SUBCUTANEOUS
Qty: 100 | Refills: 0 | Status: DISCONTINUED | OUTPATIENT
Start: 2019-10-31 | End: 2019-10-31

## 2019-10-31 RX ORDER — HEPARIN SODIUM 5000 [USP'U]/ML
1300 INJECTION INTRAVENOUS; SUBCUTANEOUS
Qty: 25000 | Refills: 0 | Status: DISCONTINUED | OUTPATIENT
Start: 2019-10-31 | End: 2019-11-03

## 2019-10-31 RX ORDER — HYDRALAZINE HCL 50 MG
10 TABLET ORAL ONCE
Refills: 0 | Status: COMPLETED | OUTPATIENT
Start: 2019-10-31 | End: 2019-10-31

## 2019-10-31 RX ORDER — INSULIN HUMAN 100 [IU]/ML
4 INJECTION, SOLUTION SUBCUTANEOUS
Qty: 100 | Refills: 0 | Status: DISCONTINUED | OUTPATIENT
Start: 2019-10-31 | End: 2019-10-31

## 2019-10-31 RX ORDER — HEPARIN SODIUM 5000 [USP'U]/ML
2000 INJECTION INTRAVENOUS; SUBCUTANEOUS ONCE
Refills: 0 | Status: COMPLETED | OUTPATIENT
Start: 2019-10-31 | End: 2019-10-31

## 2019-10-31 RX ADMIN — SPIRONOLACTONE 25 MILLIGRAM(S): 25 TABLET, FILM COATED ORAL at 05:43

## 2019-10-31 RX ADMIN — INSULIN HUMAN 2 UNIT(S)/HR: 100 INJECTION, SOLUTION SUBCUTANEOUS at 09:58

## 2019-10-31 RX ADMIN — INSULIN GLARGINE 16 UNIT(S): 100 INJECTION, SOLUTION SUBCUTANEOUS at 21:03

## 2019-10-31 RX ADMIN — Medication 81 MILLIGRAM(S): at 11:58

## 2019-10-31 RX ADMIN — HEPARIN SODIUM 11 UNIT(S)/HR: 5000 INJECTION INTRAVENOUS; SUBCUTANEOUS at 03:52

## 2019-10-31 RX ADMIN — Medication 10 MILLIGRAM(S): at 03:03

## 2019-10-31 RX ADMIN — WARFARIN SODIUM 5 MILLIGRAM(S): 2.5 TABLET ORAL at 21:03

## 2019-10-31 RX ADMIN — Medication 40 MILLIGRAM(S): at 05:43

## 2019-10-31 RX ADMIN — POLYETHYLENE GLYCOL 3350 17 GRAM(S): 17 POWDER, FOR SOLUTION ORAL at 11:58

## 2019-10-31 RX ADMIN — CHLORHEXIDINE GLUCONATE 1 APPLICATION(S): 213 SOLUTION TOPICAL at 07:43

## 2019-10-31 RX ADMIN — Medication 7 UNIT(S): at 18:07

## 2019-10-31 RX ADMIN — LISINOPRIL 5 MILLIGRAM(S): 2.5 TABLET ORAL at 09:51

## 2019-10-31 RX ADMIN — PANTOPRAZOLE SODIUM 40 MILLIGRAM(S): 20 TABLET, DELAYED RELEASE ORAL at 05:43

## 2019-10-31 RX ADMIN — Medication 1.74 MICROGRAM(S)/KG/MIN: at 01:05

## 2019-10-31 RX ADMIN — WARFARIN SODIUM 5 MILLIGRAM(S): 2.5 TABLET ORAL at 00:37

## 2019-10-31 RX ADMIN — AMIODARONE HYDROCHLORIDE 200 MILLIGRAM(S): 400 TABLET ORAL at 05:43

## 2019-10-31 RX ADMIN — ATORVASTATIN CALCIUM 80 MILLIGRAM(S): 80 TABLET, FILM COATED ORAL at 21:03

## 2019-10-31 RX ADMIN — Medication 7 UNIT(S): at 07:42

## 2019-10-31 RX ADMIN — HEPARIN SODIUM 12 UNIT(S)/HR: 5000 INJECTION INTRAVENOUS; SUBCUTANEOUS at 21:34

## 2019-10-31 RX ADMIN — HEPARIN SODIUM 2000 UNIT(S): 5000 INJECTION INTRAVENOUS; SUBCUTANEOUS at 10:58

## 2019-10-31 RX ADMIN — Medication 5 MILLIGRAM(S): at 01:05

## 2019-10-31 RX ADMIN — Medication 3: at 07:42

## 2019-10-31 NOTE — PROGRESS NOTE ADULT - ASSESSMENT
ASSESSMENT  63F POD4 from CABG x4 (BIMA, RSVG, L radial) and MR, had had IABP placed preop which was removed yesterday, found to have coolness and complaints of numbness in RLE. CTA showed right common femoral artery thrombus, short segment occlusion of the proximal anterior tibial artery and tibioperoneal trunk secondary to embolic disease with prompt reconstitution with three-vessel runoff to the ankle.      - Recommend continuing anticoagulation - heparin gtt with goal PTT of 70  - Cont Antiplatelet and statin therapy  - Continue vascular checks  - Care per CTsx and CTICU  - Discussed with Dr. Suarez    Vascular Surgery Team  [id number]

## 2019-10-31 NOTE — PROGRESS NOTE ADULT - SUBJECTIVE AND OBJECTIVE BOX
Vascular Surgery Progress Note      SUBJECTIVE: Patient seen and examined at the bedside. Feeling well this morning. Transferred to the floor from the CTICU. Patient has been noted to be delirious, Psychiatry was called and differential including delirium vs adjustment disorder. Patient has no complaints of RLE numbness or pain but she has been below goal PTT of 70 for heparin gtt. No new complaints.     VITALS  T(C): 37.1 (10-31-19 @ 11:07), Max: 37.1 (10-30-19 @ 14:00)  HR: 96 (10-31-19 @ 11:07) (75 - 101)  BP: 131/63 (10-31-19 @ 11:07) (104/78 - 177/84)  RR: 18 (10-31-19 @ 07:15) (18 - 30)  SpO2: 97% (10-31-19 @ 11:07) (95% - 100%)  CAPILLARY BLOOD GLUCOSE  253 (30 Oct 2019 12:00)      POCT Blood Glucose.: 269 mg/dL (31 Oct 2019 11:01)  POCT Blood Glucose.: 323 mg/dL (31 Oct 2019 09:56)  POCT Blood Glucose.: 289 mg/dL (31 Oct 2019 07:39)  POCT Blood Glucose.: 214 mg/dL (30 Oct 2019 16:50)  POCT Blood Glucose.: 253 mg/dL (30 Oct 2019 12:48)    Is/Os    10-30 @ 07:01  -  10-31 @ 07:00  --------------------------------------------------------  IN:    DOBUTamine Infusion: 59.5 mL    DOBUTamine Infusion: 1.7 mL    heparin Infusion: 209.5 mL    insulin regular Infusion: 2 mL    Oral Fluid: 660 mL    Packed Red Blood Cells: 300 mL    sodium chloride 0.9%: 10 mL  Total IN: 1242.7 mL    OUT:    Bulb: 25 mL    Chest Tube: 100 mL    Chest Tube: 80 mL    Voided: 1000 mL  Total OUT: 1205 mL    Total NET: 37.7 mL      10-31 @ 07:01  -  10-31 @ 11:19  --------------------------------------------------------  IN:    heparin Infusion: 35 mL    heparin Infusion: 26 mL    insulin regular Infusion: 5 mL    Oral Fluid: 310 mL  Total IN: 376 mL    OUT:    Bulb: 5 mL    Voided: 700 mL  Total OUT: 705 mL    Total NET: -329 mL        PHYSICAL EXAM:   General: NAD, Lying in bed comfortably, alert, oriented x3 but delirious  Chest/Respiratory: nonlabored on RA, Sternal dressings are clean and dry  CVS: regular rate and rhythm  Abdomen: soft, nontender, nondistended  Extremities: no edema, Dressings to RLE are clean and dry. IABP access site in R groin is dry and hemostatic, DP and PT signals on RLE, palpable DP and PT pulses on LLE. RLE noted to be slightly cooler to touch than RLE today from mid calf distally    LABS  CBC (10-31 @ 05:55)                              11.6                           14.80<H>  )----------------(  250        --    % Neutrophils, --    % Lymphocytes, ANC: --                                  34.0<L>  CBC (10-30 @ 02:22)                              9.4<L>                         11.27<H>  )----------------(  164        --    % Neutrophils, --    % Lymphocytes, ANC: --                                  28.2<L>    BMP (10-31 @ 05:55)             134<L>  |  105     |  33<H> 		Ca++ --      Ca 8.3<L>             ---------------------------------( 262<H>		Mg --                 4.6     |  18<L>   |  0.86  			Ph --      BMP (10-30 @ 02:22)             135     |  106     |  35<H> 		Ca++ --      Ca 7.5<L>             ---------------------------------( 87    		Mg 2.0                4.2     |  22      |  1.18  			Ph 2.3<L>    LFTs (10-31 @ 05:55)      TPro 5.8<L> / Alb 3.0<L> / TBili 0.6 / DBili -- / AST 40 / ALT 63<H> / AlkPhos 145<H>  LFTs (10-30 @ 02:22)      TPro 5.1<L> / Alb 2.4<L> / TBili 0.6 / DBili -- / AST 26 / ALT 42 / AlkPhos 94    Coags (10-31 @ 08:18)  aPTT 54.7<H> / INR -- / PT --  Coags (10-31 @ 05:55)  aPTT -- / INR 1.22<H> / PT 14.1<H>        VBG (10-30 @ 10:34)     7.44 / 30<L> / SEE NOTE / 20<L> / -2.4<L> / 59<L>%     Lactate: 0.9  VBG (10-30 @ 05:38)     7.44 / 34<L> / 32 / 23 / -0.6 / 55<L>%     Lactate: 1.1

## 2019-10-31 NOTE — PROGRESS NOTE ADULT - PROBLEM SELECTOR PLAN 1
Will continue current insulin regimen for now. Will continue monitoring FS, log, and FU.  Patient counseled for compliance with consistent low carb diet and exercise as tolerated outpatient. Patient counseled for compliance with consistent low carb diet and exercise as tolerated outpatient.

## 2019-10-31 NOTE — PROGRESS NOTE ADULT - SUBJECTIVE AND OBJECTIVE BOX
im  ok  VITAL SIGNS    Telemetry:    nsr  90  Vital Signs Last 24 Hrs  T(C): 37.1 (10-31-19 @ 11:07), Max: 37.1 (10-30-19 @ 14:00)  T(F): 98.7 (10-31-19 @ 11:07), Max: 98.7 (10-30-19 @ 14:00)  HR: 96 (10-31-19 @ 11:07) (78 - 101)  BP: 131/63 (10-31-19 @ 11:07) (123/62 - 177/84)  RR: 18 (10-31-19 @ 07:15) (18 - 30)  SpO2: 97% (10-31-19 @ 11:07) (95% - 100%)                   10-30 @ 07:01  -  10-31 @ 07:00  --------------------------------------------------------  IN: 1242.7 mL / OUT: 1205 mL / NET: 37.7 mL    10-31 @ 07:01  -  10-31 @ 12:35  --------------------------------------------------------  IN: 376 mL / OUT: 1215 mL / NET: -839 mL          Daily     Daily Weight in k.5 (31 Oct 2019 06:35)            CAPILLARY BLOOD GLUCOSE      POCT Blood Glucose.: 222 mg/dL (31 Oct 2019 11:55)  POCT Blood Glucose.: 269 mg/dL (31 Oct 2019 11:01)  POCT Blood Glucose.: 323 mg/dL (31 Oct 2019 09:56)  POCT Blood Glucose.: 289 mg/dL (31 Oct 2019 07:39)  POCT Blood Glucose.: 214 mg/dL (30 Oct 2019 16:50)  POCT Blood Glucose.: 253 mg/dL (30 Oct 2019 12:48)              Pacing Wires        [ x ]   Settings:                                  Isolated  [  ]    Coumadin    [x ] YES          [  ]      NO                                   PHYSICAL EXAM        Neurology: alert and oriented x 3, nonfocal, no gross deficits  CV : s1 s2 RRR  Sternal Wound :  CDI , Stable  L radial cdi, hand warm  Lungs: cta  Abdomen: soft, nontender, nondistended, positive bowel sounds, last bowel movement                       chest tubes  :    voiding / bell - sbd         Extremities:      edema   /  -   calve tenderness ,    R leg  incisions cdi  R leg cooler than left, good doppler signal          aMIOdarone    Tablet 200 milliGRAM(s) Oral daily  aspirin  chewable 81 milliGRAM(s) Oral daily  atorvastatin 80 milliGRAM(s) Oral at bedtime  chlorhexidine 2% Cloths 1 Application(s) Topical <User Schedule>  dextrose 10% Bolus 125 milliLiter(s) IV Bolus once  dextrose 10% Bolus 250 milliLiter(s) IV Bolus once  dextrose 40% Gel 15 Gram(s) Oral once PRN  dextrose 5%. 1000 milliLiter(s) IV Continuous <Continuous>  dextrose 50% Injectable 50 milliLiter(s) IV Push every 15 minutes  dextrose 50% Injectable 25 milliLiter(s) IV Push every 15 minutes  furosemide    Tablet 40 milliGRAM(s) Oral daily  glucagon  Injectable 1 milliGRAM(s) IntraMuscular once PRN  heparin  Infusion 1300 Unit(s)/Hr IV Continuous <Continuous>  insulin glargine Injectable (LANTUS) 16 Unit(s) SubCutaneous at bedtime  insulin lispro (HumaLOG) corrective regimen sliding scale   SubCutaneous three times a day before meals  insulin lispro (HumaLOG) corrective regimen sliding scale   SubCutaneous at bedtime  insulin lispro Injectable (HumaLOG) 7 Unit(s) SubCutaneous three times a day before meals  insulin regular Infusion 2 Unit(s)/Hr IV Continuous <Continuous>  lisinopril 5 milliGRAM(s) Oral daily  pantoprazole    Tablet 40 milliGRAM(s) Oral before breakfast  polyethylene glycol 3350 17 Gram(s) Oral daily  spironolactone 25 milliGRAM(s) Oral daily  warfarin 5 milliGRAM(s) Oral once                    Physical Therapy Rec:   Home  [  ]   Home w/ PT  [  ]  Rehab  [  ]  Discussed with Cardiothoracic Team at AM rounds.

## 2019-10-31 NOTE — PROGRESS NOTE ADULT - ASSESSMENT
Assessment  DMT2: 63y Female with DM T2 with hyperglycemia, A1C 7.5%, was on oral meds, now S/P CABG on insulin, blood sugars improving, now trending within acceptable range, no hypoglycemic episode, patient eating full meals, appears comfortable.  CAD: S/P CABG 10/25, on medications, no chest pain, stable, monitored.  HTN: Controlled,  on antihypertensive medications.  HLD: On statin, compliant with  intake.          Mireya Niño MD  Cell: [phone]  Office: [phone] Assessment  DMT2: 63y Female with DM T2 with hyperglycemia, A1C 7.5%, was on oral meds, now S/P CABG on insulin, patient did not get her Lantus last night, blood sugars were elevated this AM, FS now improving and trending within acceptable range, no hypoglycemic episode, patient eating full meals, appears comfortable.  CAD: S/P CABG 10/25, on medications, no chest pain, stable, monitored.  HTN: Controlled,  on antihypertensive medications.  HLD: On statin, compliant with  intake.          Mireya Niño MD  Cell: [phone]  Office: [phone] Assessment  DMT2: 63y Female with DM T2 with hyperglycemia, A1C 7.5%, was on oral meds, now S/P CABG on insulin,  patient did not get her Lantus last night, blood sugars were elevated this AM, FS now improving and trending within acceptable range, no hypoglycemic episode, patient eating full meals, appears comfortable.  CAD: S/P CABG 10/25, on medications, no chest pain, stable, monitored.  HTN: Controlled,  on antihypertensive medications.  HLD: On statin, compliant with  intake.          Mireya Niño MD  Cell: [phone]  Office: [phone]

## 2019-10-31 NOTE — PROGRESS NOTE ADULT - ASSESSMENT
Pt is a 64 yo female with DM, HTN, and CVA (requiring cane to ambulate, but no focal deficits)who presented to [facility] with a several hour history of shortness of breath and abdominal pain. Patient states these symptoms started around 4 pm yesterday to this. Prior to this day, patient denies having any similar symptoms prior to yesterday. She had an EKG at [facility] that showed ALAN in lead III and ST depressions in V2-V5. The troponing at the OSH was 1.15, nd propBNP of 4000. She was given lasix, ASA/Brillinta loaded, and started on heparin drip. She was transferred to [facility] for HLOC given concern for NSTEMI.  s/p 10/22 LHC demonstrating severe 3VD and CT Surgery consulted for CABG evaluation.  Endo for glucose control  10/25 Clipping, left atrial appendage  MVR (mitral valve replacement) 31mm tissue valve   CABG x 4  LIMA to OM, DELIA to LAD, SVG to PRICE, Radial artery Y graft to PDA off of SVG   pre-op IABP   Post op pressors, inotropes  Cardiogenic shock  VETO  Ventricular ectopy- amio load  Remains on dobutamine, poor CI/vbg when weaned off.  Acutee blood loss anemia prbc  RUE midline for access  Psych for delerium- a&o- anxiety  R foot ischemia/cold limb s/p CTA which shows small area of low flow - on Heparin gtt, vascular is following, will manage medically; starting Coumadin  Remains on  2mcg  10/31  d/c overnight.   Pt with confusion, non conpliant overnight, improvved this am.  She did not take her Lantus, insulin infusion started this Am for hyperglycemia  Lisinopril added for BP and afterload reduction.  Сергей remains in place  The pt's R foot is slightly cooler than L but the patient has no complaints, examed with Dr Suarez.  Will nee to keep the PTT therapeutic.  Echo ordered and completed

## 2019-11-01 LAB
ALBUMIN SERPL ELPH-MCNC: 2.6 G/DL — LOW (ref 3.3–5)
ALP SERPL-CCNC: 151 U/L — HIGH (ref 40–120)
ALT FLD-CCNC: 77 U/L — HIGH (ref 10–45)
ANION GAP SERPL CALC-SCNC: 12 MMOL/L — SIGNIFICANT CHANGE UP (ref 5–17)
APTT BLD: 78.7 SEC — HIGH (ref 27.5–36.3)
APTT BLD: 90.2 SEC — HIGH (ref 27.5–36.3)
AST SERPL-CCNC: 60 U/L — HIGH (ref 10–40)
BILIRUB SERPL-MCNC: 0.8 MG/DL — SIGNIFICANT CHANGE UP (ref 0.2–1.2)
BUN SERPL-MCNC: 32 MG/DL — HIGH (ref 7–23)
CALCIUM SERPL-MCNC: 8.2 MG/DL — LOW (ref 8.4–10.5)
CHLORIDE SERPL-SCNC: 103 MMOL/L — SIGNIFICANT CHANGE UP (ref 96–108)
CO2 SERPL-SCNC: 21 MMOL/L — LOW (ref 22–31)
CREAT SERPL-MCNC: 0.83 MG/DL — SIGNIFICANT CHANGE UP (ref 0.5–1.3)
GLUCOSE BLDC GLUCOMTR-MCNC: 116 MG/DL — HIGH (ref 70–99)
GLUCOSE BLDC GLUCOMTR-MCNC: 150 MG/DL — HIGH (ref 70–99)
GLUCOSE BLDC GLUCOMTR-MCNC: 163 MG/DL — HIGH (ref 70–99)
GLUCOSE BLDC GLUCOMTR-MCNC: 184 MG/DL — HIGH (ref 70–99)
GLUCOSE BLDC GLUCOMTR-MCNC: 268 MG/DL — HIGH (ref 70–99)
GLUCOSE SERPL-MCNC: 162 MG/DL — HIGH (ref 70–99)
HCT VFR BLD CALC: 31.1 % — LOW (ref 34.5–45)
HGB BLD-MCNC: 10.2 G/DL — LOW (ref 11.5–15.5)
INR BLD: 1.47 RATIO — HIGH (ref 0.88–1.16)
INR BLD: 1.69 RATIO — HIGH (ref 0.88–1.16)
MCHC RBC-ENTMCNC: 27.1 PG — SIGNIFICANT CHANGE UP (ref 27–34)
MCHC RBC-ENTMCNC: 32.8 GM/DL — SIGNIFICANT CHANGE UP (ref 32–36)
MCV RBC AUTO: 82.7 FL — SIGNIFICANT CHANGE UP (ref 80–100)
NRBC # BLD: 0 /100 WBCS — SIGNIFICANT CHANGE UP (ref 0–0)
PLATELET # BLD AUTO: 266 K/UL — SIGNIFICANT CHANGE UP (ref 150–400)
POTASSIUM SERPL-MCNC: 4.4 MMOL/L — SIGNIFICANT CHANGE UP (ref 3.5–5.3)
POTASSIUM SERPL-SCNC: 4.4 MMOL/L — SIGNIFICANT CHANGE UP (ref 3.5–5.3)
PROT SERPL-MCNC: 5.5 G/DL — LOW (ref 6–8.3)
PROTHROM AB SERPL-ACNC: 16.9 SEC — HIGH (ref 10–12.9)
PROTHROM AB SERPL-ACNC: 19.6 SEC — HIGH (ref 10–12.9)
RBC # BLD: 3.76 M/UL — LOW (ref 3.8–5.2)
RBC # FLD: 16.4 % — HIGH (ref 10.3–14.5)
SODIUM SERPL-SCNC: 136 MMOL/L — SIGNIFICANT CHANGE UP (ref 135–145)
WBC # BLD: 13.73 K/UL — HIGH (ref 3.8–10.5)
WBC # FLD AUTO: 13.73 K/UL — HIGH (ref 3.8–10.5)

## 2019-11-01 RX ORDER — METOPROLOL TARTRATE 50 MG
25 TABLET ORAL EVERY 12 HOURS
Refills: 0 | Status: DISCONTINUED | OUTPATIENT
Start: 2019-11-01 | End: 2019-11-02

## 2019-11-01 RX ORDER — WARFARIN SODIUM 2.5 MG/1
2.5 TABLET ORAL ONCE
Refills: 0 | Status: COMPLETED | OUTPATIENT
Start: 2019-11-01 | End: 2019-11-01

## 2019-11-01 RX ORDER — INSULIN GLARGINE 100 [IU]/ML
20 INJECTION, SOLUTION SUBCUTANEOUS AT BEDTIME
Refills: 0 | Status: DISCONTINUED | OUTPATIENT
Start: 2019-11-01 | End: 2019-11-04

## 2019-11-01 RX ORDER — INSULIN LISPRO 100/ML
8 VIAL (ML) SUBCUTANEOUS
Refills: 0 | Status: DISCONTINUED | OUTPATIENT
Start: 2019-11-01 | End: 2019-11-04

## 2019-11-01 RX ORDER — ACETAMINOPHEN 500 MG
650 TABLET ORAL EVERY 6 HOURS
Refills: 0 | Status: DISCONTINUED | OUTPATIENT
Start: 2019-11-01 | End: 2019-11-08

## 2019-11-01 RX ADMIN — CHLORHEXIDINE GLUCONATE 1 APPLICATION(S): 213 SOLUTION TOPICAL at 02:22

## 2019-11-01 RX ADMIN — WARFARIN SODIUM 2.5 MILLIGRAM(S): 2.5 TABLET ORAL at 21:25

## 2019-11-01 RX ADMIN — SPIRONOLACTONE 25 MILLIGRAM(S): 25 TABLET, FILM COATED ORAL at 05:16

## 2019-11-01 RX ADMIN — Medication 8 UNIT(S): at 12:34

## 2019-11-01 RX ADMIN — Medication 7 UNIT(S): at 07:55

## 2019-11-01 RX ADMIN — Medication 3: at 12:34

## 2019-11-01 RX ADMIN — Medication 81 MILLIGRAM(S): at 12:34

## 2019-11-01 RX ADMIN — ATORVASTATIN CALCIUM 80 MILLIGRAM(S): 80 TABLET, FILM COATED ORAL at 21:25

## 2019-11-01 RX ADMIN — INSULIN GLARGINE 20 UNIT(S): 100 INJECTION, SOLUTION SUBCUTANEOUS at 22:25

## 2019-11-01 RX ADMIN — LISINOPRIL 5 MILLIGRAM(S): 2.5 TABLET ORAL at 05:16

## 2019-11-01 RX ADMIN — Medication 25 MILLIGRAM(S): at 18:14

## 2019-11-01 RX ADMIN — HEPARIN SODIUM 11.5 UNIT(S)/HR: 5000 INJECTION INTRAVENOUS; SUBCUTANEOUS at 07:15

## 2019-11-01 RX ADMIN — Medication 1: at 07:54

## 2019-11-01 RX ADMIN — PANTOPRAZOLE SODIUM 40 MILLIGRAM(S): 20 TABLET, DELAYED RELEASE ORAL at 05:16

## 2019-11-01 RX ADMIN — Medication 650 MILLIGRAM(S): at 17:00

## 2019-11-01 RX ADMIN — HEPARIN SODIUM 12 UNIT(S)/HR: 5000 INJECTION INTRAVENOUS; SUBCUTANEOUS at 04:27

## 2019-11-01 RX ADMIN — AMIODARONE HYDROCHLORIDE 200 MILLIGRAM(S): 400 TABLET ORAL at 05:16

## 2019-11-01 RX ADMIN — Medication 40 MILLIGRAM(S): at 05:16

## 2019-11-01 RX ADMIN — Medication 8 UNIT(S): at 16:48

## 2019-11-01 RX ADMIN — HEPARIN SODIUM 11.5 UNIT(S)/HR: 5000 INJECTION INTRAVENOUS; SUBCUTANEOUS at 05:14

## 2019-11-01 RX ADMIN — Medication 650 MILLIGRAM(S): at 17:30

## 2019-11-01 NOTE — PROGRESS NOTE ADULT - SUBJECTIVE AND OBJECTIVE BOX
im good    VITAL SIGNS    Telemetry:  nsr 80  Vital Signs Last 24 Hrs  T(C): 36.7 (19 @ 08:00), Max: 37.1 (10-31-19 @ 11:07)  T(F): 98.1 (19 @ 08:00), Max: 98.7 (10-31-19 @ 11:07)  HR: 85 (19 08:00) (81 - 96)  BP: 148/71 (19 @ 08:00) (117/59 - 148/71)  RR: 18 (19 @ 08:00) (18 - 18)  SpO2: 97% (19 @ 08:00) (96% - 99%)                       10.2<L>                136  | 21<L>| 32<H>        13.73<H> >-----------< 266     ------------------------< 162<H>                 31.1<L>                4.4  | 103  | 0.83                                                                      Ca 8.2<L> Mg x     Ph x        ,             11.6                 134<L>| 18<L>| 33<H>        14.80<H> >-----------< 250     ------------------------< 262<H>                 34.0<L>                4.6  | 105  | 0.86                                                                      Ca 8.3<L> Mg x     Ph x                10-31-19 @ 07:  -  19 @ 07:00  --------------------------------------------------------  IN: 1176 mL / OUT: 2715 mL / NET: -1539 mL    19 @ 07:01  -  19 @ 09:48  --------------------------------------------------------  IN: 11.5 mL / OUT: 210 mL / NET: -198.5 mL     @ 03:49  PT16.9 INR1.47  PTT90.2  10-31 @ 20:42  PT-- INR--  PTT96.2  10-31 @ 14:09  PT-- INR--  VJX081.4  10-31 @ 08:18  PT-- INR--  PTT54.7  10-31 @ 05:55  PT14.1 INR1.22  PTT--  10-31 @ 02:26  PT-- INR--  PTT49.5  10-30 @ 19:52  PT-- INR--  PTT52.7  10-30 @ 05:39  PT-- INR--  PTT77.5  10-29 @ 22:59  PT-- INR--  PTT66.9  10-29 @ 16:34  PT14.1 INR1.23  PTT53.3  10-29 @ 06:25  PT-- INR--  PTT52.8  10-29 @ 00:22  PT15.1 INR1.30  PTT62.7  10-28 @ 14:25  PT15.2 INR1.31  PTT54.5  10-27 @ 22:04  PT-- INR--  PTT79.4  10-27 @ 16:23  PT16.1 INR1.40  PTT95.2      Daily     Daily Weight in k.9 (2019 05:55)        CAPILLARY BLOOD GLUCOSE  184 (2019 08:00)      POCT Blood Glucose.: 184 mg/dL (2019 07:50)  POCT Blood Glucose.: 116 mg/dL (2019 00:48)  POCT Blood Glucose.: 177 mg/dL (31 Oct 2019 22:30)  POCT Blood Glucose.: 209 mg/dL (31 Oct 2019 20:24)  POCT Blood Glucose.: 185 mg/dL (31 Oct 2019 19:05)  POCT Blood Glucose.: 150 mg/dL (31 Oct 2019 18:03)  POCT Blood Glucose.: 132 mg/dL (31 Oct 2019 17:03)  POCT Blood Glucose.: 115 mg/dL (31 Oct 2019 16:02)  POCT Blood Glucose.: 123 mg/dL (31 Oct 2019 15:32)  POCT Blood Glucose.: 96 mg/dL (31 Oct 2019 14:51)  POCT Blood Glucose.: 113 mg/dL (31 Oct 2019 14:02)  POCT Blood Glucose.: 119 mg/dL (31 Oct 2019 12:56)  POCT Blood Glucose.: 222 mg/dL (31 Oct 2019 11:55)  POCT Blood Glucose.: 269 mg/dL (31 Oct 2019 11:01)  POCT Blood Glucose.: 323 mg/dL (31 Oct 2019 09:56)                Coumadin    [ ] YES          [x  ]      NO                                   PHYSICAL EXAM        Neurology: alert and oriented x 3, nonfocal, no gross deficits  CV : .S1S2 RRR  Sternal Wound :  CDI , Stable  Pacing Wires        [  ]   Settings:                                  Isolated  [ x ]  Lungs: bibasilar crackles   Abdomen: soft, nontender, nondistended, positive bowel sounds, last bowel movement +  :         voiding    Extremities:    trace __ edema, __-_calf tenderness.                            R__svg site cdi          aMIOdarone    Tablet 200 milliGRAM(s) Oral daily  aspirin  chewable 81 milliGRAM(s) Oral daily  atorvastatin 80 milliGRAM(s) Oral at bedtime  chlorhexidine 2% Cloths 1 Application(s) Topical <User Schedule>  dextrose 10% Bolus 125 milliLiter(s) IV Bolus once  dextrose 10% Bolus 250 milliLiter(s) IV Bolus once  dextrose 40% Gel 15 Gram(s) Oral once PRN  dextrose 5%. 1000 milliLiter(s) IV Continuous <Continuous>  dextrose 50% Injectable 50 milliLiter(s) IV Push every 15 minutes  dextrose 50% Injectable 25 milliLiter(s) IV Push every 15 minutes  furosemide    Tablet 40 milliGRAM(s) Oral daily  glucagon  Injectable 1 milliGRAM(s) IntraMuscular once PRN  heparin  Infusion 1300 Unit(s)/Hr IV Continuous <Continuous>  insulin glargine Injectable (LANTUS) 16 Unit(s) SubCutaneous at bedtime  insulin lispro (HumaLOG) corrective regimen sliding scale   SubCutaneous three times a day before meals  insulin lispro (HumaLOG) corrective regimen sliding scale   SubCutaneous at bedtime  insulin lispro Injectable (HumaLOG) 7 Unit(s) SubCutaneous three times a day before meals  lisinopril 5 milliGRAM(s) Oral daily  pantoprazole    Tablet 40 milliGRAM(s) Oral before breakfast  polyethylene glycol 3350 17 Gram(s) Oral daily  spironolactone 25 milliGRAM(s) Oral daily                    Physical Therapy Rec:   Home  [  ]   Home w/ PT  [  ]  Rehab  [  ]  Discussed with Cardiothoracic Team at AM rounds.

## 2019-11-01 NOTE — PROGRESS NOTE ADULT - ATTENDING COMMENTS
Will increase Lantus to 20u at bedtime, increase Humalog to 8u before each meal, and continue Humalog correction scale coverage.

## 2019-11-01 NOTE — PROGRESS NOTE ADULT - ASSESSMENT
Assessment  DMT2: 63y Female with DM T2 with hyperglycemia, A1C 7.5%, was on oral meds, S/P CABG on insulin, blood sugars still elevated and not at target, no hypoglycemic episode, patient eating large and full meals, noncompliant with diet, family brings food from home.  CAD: S/P CABG 10/25, on medications, no chest pain, stable, monitored.  HTN: Controlled,  on antihypertensive medications.  HLD: On statin, compliant with  intake.          Mireya Niño MD  Cell: [phone]  Office: [phone] Assessment  DMT2: 63y Female with DM T2 with hyperglycemia, A1C 7.5%, was on oral meds, S/P CABG on insulin, blood sugars still elevated and not at target, no hypoglycemic episode, patient eating large and full meals,  noncompliant with diet, family brings food from home.  CAD: S/P CABG 10/25, on medications, no chest pain, stable, monitored.  HTN: Controlled,  on antihypertensive medications.  HLD: On statin, compliant with  intake.          Mireya Niño MD  Cell: [phone]  Office: [phone]

## 2019-11-01 NOTE — PROGRESS NOTE ADULT - SUBJECTIVE AND OBJECTIVE BOX
Vascular Surgery Progress Note    SUBJECTIVE: Patient seen and examined at the bedside with Dr. Suarez. Feeling well this morning. The patient states that her numbness in the RLE has improved. She has no pain in the RLE. Tolerating regular diet. Pain is well controlled. Ambulated well yesterday per nursing.      VITALS  T(C): 36.7 (11-01-19 @ 04:31), Max: 37.1 (10-31-19 @ 11:07)  HR: 82 (11-01-19 @ 05:17) (81 - 96)  BP: 145/71 (11-01-19 @ 05:17) (117/59 - 145/71)  RR: 18 (11-01-19 @ 05:17) (18 - 18)  SpO2: 97% (11-01-19 @ 05:17) (96% - 99%)  CAPILLARY BLOOD GLUCOSE      POCT Blood Glucose.: 116 mg/dL (01 Nov 2019 00:48)  POCT Blood Glucose.: 177 mg/dL (31 Oct 2019 22:30)  POCT Blood Glucose.: 209 mg/dL (31 Oct 2019 20:24)  POCT Blood Glucose.: 185 mg/dL (31 Oct 2019 19:05)  POCT Blood Glucose.: 150 mg/dL (31 Oct 2019 18:03)  POCT Blood Glucose.: 132 mg/dL (31 Oct 2019 17:03)  POCT Blood Glucose.: 115 mg/dL (31 Oct 2019 16:02)  POCT Blood Glucose.: 123 mg/dL (31 Oct 2019 15:32)  POCT Blood Glucose.: 96 mg/dL (31 Oct 2019 14:51)  POCT Blood Glucose.: 113 mg/dL (31 Oct 2019 14:02)  POCT Blood Glucose.: 119 mg/dL (31 Oct 2019 12:56)  POCT Blood Glucose.: 222 mg/dL (31 Oct 2019 11:55)  POCT Blood Glucose.: 269 mg/dL (31 Oct 2019 11:01)  POCT Blood Glucose.: 323 mg/dL (31 Oct 2019 09:56)  POCT Blood Glucose.: 289 mg/dL (31 Oct 2019 07:39)    Is/Os    10-31 @ 07:01  -  11-01 @ 07:00  --------------------------------------------------------  IN:    heparin Infusion: 242.5 mL    heparin Infusion: 35 mL    insulin regular Infusion: 18.5 mL    Oral Fluid: 880 mL  Total IN: 1176 mL    OUT:    Bulb: 65 mL    Voided: 2650 mL  Total OUT: 2715 mL    Total NET: -1539 mL        PHYSICAL EXAM:  General: NAD, Lying in bed comfortably, alert, oriented, no delirium this morning  Chest/Respiratory: nonlabored on RA, Sternal dressings are clean and dry  Extremities: no edema, Dressings to RLE are clean and dry. DP and PT signals on RLE, palpable DP and PT pulses on LLE. RLE noted to be slightly cooler to touch than RLE today from mid calf distally. 5/5 motor in RLE    LABS  CBC (11-01 @ 03:49)                              10.2<L>                         13.73<H>  )----------------(  266        --    % Neutrophils, --    % Lymphocytes, ANC: --                                  31.1<L>  CBC (10-31 @ 05:55)                              11.6                           14.80<H>  )----------------(  250        --    % Neutrophils, --    % Lymphocytes, ANC: --                                  34.0<L>    BMP (11-01 @ 03:49)             136     |  103     |  32<H> 		Ca++ --      Ca 8.2<L>             ---------------------------------( 162<H>		Mg --                 4.4     |  21<L>   |  0.83  			Ph --      BMP (10-31 @ 05:55)             134<L>  |  105     |  33<H> 		Ca++ --      Ca 8.3<L>             ---------------------------------( 262<H>		Mg --                 4.6     |  18<L>   |  0.86  			Ph --        LFTs (11-01 @ 03:49)      TPro 5.5<L> / Alb 2.6<L> / TBili 0.8 / DBili -- / AST 60<H> / ALT 77<H> / AlkPhos 151<H>  LFTs (10-31 @ 05:55)      TPro 5.8<L> / Alb 3.0<L> / TBili 0.6 / DBili -- / AST 40 / ALT 63<H> / AlkPhos 145<H>    Coags (11-01 @ 03:49)  aPTT 90.2<H> / INR 1.47<H> / PT 16.9<H>  Coags (10-31 @ 20:42)  aPTT 96.2<H> / INR -- / PT --

## 2019-11-01 NOTE — PROGRESS NOTE ADULT - ASSESSMENT
ASSESSMENT  63F s/p CABG x4 (BIMA, RSVG, L radial) and MR, had had IABP placed preop which was removed yesterday, found to have coolness and complaints of numbness in RLE. CTA showed right common femoral artery thrombus, short segment occlusion of the proximal anterior tibial artery and tibioperoneal trunk secondary to embolic disease with prompt reconstitution with three-vessel runoff to the ankle. Recovering well with anticoagulation      - Recommend continuing anticoagulation with goal PTT of 70  - Cont Antiplatelet and statin therapy  - Continue vascular checks  - Care per CTsx   - Discussed with Dr. Suarez    Vascular Surgery Team  [id number]

## 2019-11-01 NOTE — PROGRESS NOTE ADULT - SUBJECTIVE AND OBJECTIVE BOX
Chief complaint  Patient is a 63y old  Female who presents with a chief complaint of NSTEMI (01 Nov 2019 09:48)   Review of systems  Patient in bed, looks comfortable, no fever, no hypoglycemia.    Labs and Fingersticks  CAPILLARY BLOOD GLUCOSE  268 (01 Nov 2019 12:00)  184 (01 Nov 2019 08:00)      POCT Blood Glucose.: 268 mg/dL (01 Nov 2019 12:32)  POCT Blood Glucose.: 184 mg/dL (01 Nov 2019 07:50)  POCT Blood Glucose.: 116 mg/dL (01 Nov 2019 00:48)  POCT Blood Glucose.: 177 mg/dL (31 Oct 2019 22:30)  POCT Blood Glucose.: 209 mg/dL (31 Oct 2019 20:24)  POCT Blood Glucose.: 185 mg/dL (31 Oct 2019 19:05)  POCT Blood Glucose.: 150 mg/dL (31 Oct 2019 18:03)  POCT Blood Glucose.: 132 mg/dL (31 Oct 2019 17:03)  POCT Blood Glucose.: 115 mg/dL (31 Oct 2019 16:02)  POCT Blood Glucose.: 123 mg/dL (31 Oct 2019 15:32)  POCT Blood Glucose.: 96 mg/dL (31 Oct 2019 14:51)  POCT Blood Glucose.: 113 mg/dL (31 Oct 2019 14:02)  POCT Blood Glucose.: 119 mg/dL (31 Oct 2019 12:56)      Anion Gap, Serum: 12 (11-01 @ 03:49)  Anion Gap, Serum: 11 (10-31 @ 05:55)      Calcium, Total Serum: 8.2 <L> (11-01 @ 03:49)  Calcium, Total Serum: 8.3 <L> (10-31 @ 05:55)  Albumin, Serum: 2.6 <L> (11-01 @ 03:49)  Albumin, Serum: 3.0 <L> (10-31 @ 05:55)    Alanine Aminotransferase (ALT/SGPT): 77 <H> (11-01 @ 03:49)  Alanine Aminotransferase (ALT/SGPT): 63 <H> (10-31 @ 05:55)  Alkaline Phosphatase, Serum: 151 <H> (11-01 @ 03:49)  Alkaline Phosphatase, Serum: 145 <H> (10-31 @ 05:55)  Aspartate Aminotransferase (AST/SGOT): 60 <H> (11-01 @ 03:49)  Aspartate Aminotransferase (AST/SGOT): 40 (10-31 @ 05:55)        11-01    136  |  103  |  32<H>  ----------------------------<  162<H>  4.4   |  21<L>  |  0.83    Ca    8.2<L>      01 Nov 2019 03:49    TPro  5.5<L>  /  Alb  2.6<L>  /  TBili  0.8  /  DBili  x   /  AST  60<H>  /  ALT  77<H>  /  AlkPhos  151<H>  11-01                        10.2   13.73 )-----------( 266      ( 01 Nov 2019 03:49 )             31.1     Medications  MEDICATIONS  (STANDING):  aMIOdarone    Tablet 200 milliGRAM(s) Oral daily  aspirin  chewable 81 milliGRAM(s) Oral daily  atorvastatin 80 milliGRAM(s) Oral at bedtime  chlorhexidine 2% Cloths 1 Application(s) Topical <User Schedule>  dextrose 10% Bolus 125 milliLiter(s) IV Bolus once  dextrose 10% Bolus 250 milliLiter(s) IV Bolus once  dextrose 5%. 1000 milliLiter(s) (50 mL/Hr) IV Continuous <Continuous>  dextrose 50% Injectable 50 milliLiter(s) IV Push every 15 minutes  dextrose 50% Injectable 25 milliLiter(s) IV Push every 15 minutes  furosemide    Tablet 40 milliGRAM(s) Oral daily  heparin  Infusion 1300 Unit(s)/Hr (11.5 mL/Hr) IV Continuous <Continuous>  insulin glargine Injectable (LANTUS) 20 Unit(s) SubCutaneous at bedtime  insulin lispro (HumaLOG) corrective regimen sliding scale   SubCutaneous three times a day before meals  insulin lispro (HumaLOG) corrective regimen sliding scale   SubCutaneous at bedtime  insulin lispro Injectable (HumaLOG) 8 Unit(s) SubCutaneous three times a day before meals  lisinopril 5 milliGRAM(s) Oral daily  pantoprazole    Tablet 40 milliGRAM(s) Oral before breakfast  polyethylene glycol 3350 17 Gram(s) Oral daily  spironolactone 25 milliGRAM(s) Oral daily  warfarin 2.5 milliGRAM(s) Oral once      Physical Exam  General: Patient comfortable in bed  Vital Signs Last 12 Hrs  T(F): 98.2 (11-01-19 @ 12:00), Max: 98.2 (11-01-19 @ 12:00)  HR: 89 (11-01-19 @ 12:10) (82 - 92)  BP: 139/70 (11-01-19 @ 12:10) (117/72 - 148/71)  BP(mean): 95 (11-01-19 @ 12:00) (90 - 101)  RR: 18 (11-01-19 @ 12:00) (18 - 18)  SpO2: 99% (11-01-19 @ 12:10) (96% - 99%)  Neck: No palpable thyroid nodules.  CVS: S1S2, No murmurs  Respiratory: No wheezing, no crepitations  GI: Abdomen soft, bowel sounds positive  Musculoskeletal:  edema lower extremities.   Skin: No skin rashes, no ecchymosis    Diagnostics Chief complaint  Patient is a 63y old  Female who presents with a chief complaint of NSTEMI (01 Nov 2019 09:48)   Review of systems  Patient in bed, looks comfortable, no fever,  no hypoglycemia.    Labs and Fingersticks  CAPILLARY BLOOD GLUCOSE  268 (01 Nov 2019 12:00)  184 (01 Nov 2019 08:00)      POCT Blood Glucose.: 268 mg/dL (01 Nov 2019 12:32)  POCT Blood Glucose.: 184 mg/dL (01 Nov 2019 07:50)  POCT Blood Glucose.: 116 mg/dL (01 Nov 2019 00:48)  POCT Blood Glucose.: 177 mg/dL (31 Oct 2019 22:30)  POCT Blood Glucose.: 209 mg/dL (31 Oct 2019 20:24)  POCT Blood Glucose.: 185 mg/dL (31 Oct 2019 19:05)  POCT Blood Glucose.: 150 mg/dL (31 Oct 2019 18:03)  POCT Blood Glucose.: 132 mg/dL (31 Oct 2019 17:03)  POCT Blood Glucose.: 115 mg/dL (31 Oct 2019 16:02)  POCT Blood Glucose.: 123 mg/dL (31 Oct 2019 15:32)  POCT Blood Glucose.: 96 mg/dL (31 Oct 2019 14:51)  POCT Blood Glucose.: 113 mg/dL (31 Oct 2019 14:02)  POCT Blood Glucose.: 119 mg/dL (31 Oct 2019 12:56)      Anion Gap, Serum: 12 (11-01 @ 03:49)  Anion Gap, Serum: 11 (10-31 @ 05:55)      Calcium, Total Serum: 8.2 <L> (11-01 @ 03:49)  Calcium, Total Serum: 8.3 <L> (10-31 @ 05:55)  Albumin, Serum: 2.6 <L> (11-01 @ 03:49)  Albumin, Serum: 3.0 <L> (10-31 @ 05:55)    Alanine Aminotransferase (ALT/SGPT): 77 <H> (11-01 @ 03:49)  Alanine Aminotransferase (ALT/SGPT): 63 <H> (10-31 @ 05:55)  Alkaline Phosphatase, Serum: 151 <H> (11-01 @ 03:49)  Alkaline Phosphatase, Serum: 145 <H> (10-31 @ 05:55)  Aspartate Aminotransferase (AST/SGOT): 60 <H> (11-01 @ 03:49)  Aspartate Aminotransferase (AST/SGOT): 40 (10-31 @ 05:55)        11-01    136  |  103  |  32<H>  ----------------------------<  162<H>  4.4   |  21<L>  |  0.83    Ca    8.2<L>      01 Nov 2019 03:49    TPro  5.5<L>  /  Alb  2.6<L>  /  TBili  0.8  /  DBili  x   /  AST  60<H>  /  ALT  77<H>  /  AlkPhos  151<H>  11-01                        10.2   13.73 )-----------( 266      ( 01 Nov 2019 03:49 )             31.1     Medications  MEDICATIONS  (STANDING):  aMIOdarone    Tablet 200 milliGRAM(s) Oral daily  aspirin  chewable 81 milliGRAM(s) Oral daily  atorvastatin 80 milliGRAM(s) Oral at bedtime  chlorhexidine 2% Cloths 1 Application(s) Topical <User Schedule>  dextrose 10% Bolus 125 milliLiter(s) IV Bolus once  dextrose 10% Bolus 250 milliLiter(s) IV Bolus once  dextrose 5%. 1000 milliLiter(s) (50 mL/Hr) IV Continuous <Continuous>  dextrose 50% Injectable 50 milliLiter(s) IV Push every 15 minutes  dextrose 50% Injectable 25 milliLiter(s) IV Push every 15 minutes  furosemide    Tablet 40 milliGRAM(s) Oral daily  heparin  Infusion 1300 Unit(s)/Hr (11.5 mL/Hr) IV Continuous <Continuous>  insulin glargine Injectable (LANTUS) 20 Unit(s) SubCutaneous at bedtime  insulin lispro (HumaLOG) corrective regimen sliding scale   SubCutaneous three times a day before meals  insulin lispro (HumaLOG) corrective regimen sliding scale   SubCutaneous at bedtime  insulin lispro Injectable (HumaLOG) 8 Unit(s) SubCutaneous three times a day before meals  lisinopril 5 milliGRAM(s) Oral daily  pantoprazole    Tablet 40 milliGRAM(s) Oral before breakfast  polyethylene glycol 3350 17 Gram(s) Oral daily  spironolactone 25 milliGRAM(s) Oral daily  warfarin 2.5 milliGRAM(s) Oral once      Physical Exam  General: Patient comfortable in bed  Vital Signs Last 12 Hrs  T(F): 98.2 (11-01-19 @ 12:00), Max: 98.2 (11-01-19 @ 12:00)  HR: 89 (11-01-19 @ 12:10) (82 - 92)  BP: 139/70 (11-01-19 @ 12:10) (117/72 - 148/71)  BP(mean): 95 (11-01-19 @ 12:00) (90 - 101)  RR: 18 (11-01-19 @ 12:00) (18 - 18)  SpO2: 99% (11-01-19 @ 12:10) (96% - 99%)  Neck: No palpable thyroid nodules.  CVS: S1S2, No murmurs  Respiratory: No wheezing, no crepitations  GI: Abdomen soft, bowel sounds positive  Musculoskeletal:  edema lower extremities.   Skin: No skin rashes, no ecchymosis    Diagnostics

## 2019-11-01 NOTE — PROGRESS NOTE ADULT - ASSESSMENT
Pt is a 62 yo female with DM, HTN, and CVA (requiring cane to ambulate, but no focal deficits)who presented to [facility] with a several hour history of shortness of breath and abdominal pain. Patient states these symptoms started around 4 pm yesterday to this. Prior to this day, patient denies having any similar symptoms prior to yesterday. She had an EKG at [facility] that showed ALAN in lead III and ST depressions in V2-V5. The troponing at the OSH was 1.15, nd propBNP of 4000. She was given lasix, ASA/Brillinta loaded, and started on heparin drip. She was transferred to [facility] for HLOC given concern for NSTEMI.  s/p 10/22 LHC demonstrating severe 3VD and CT Surgery consulted for CABG evaluation.  Endo for glucose control  10/25 Clipping, left atrial appendage  MVR (mitral valve replacement) 31mm tissue valve   CABG x 4  LIMA to OM, DELIA to LAD, SVG to PRICE, Radial artery Y graft to PDA off of SVG   pre-op IABP   Post op pressors, inotropes  Cardiogenic shock  VETO  Ventricular ectopy- amio load  Remains on dobutamine, poor CI/vbg when weaned off.  Acutee blood loss anemia prbc  RUE midline for access  Psych for delerium- a&o- anxiety  R foot ischemia/cold limb s/p CTA which shows small area of low flow - on Heparin gtt, vascular is following, will manage medically; starting Coumadin  Remains on  2mcg  10/31  d/c overnight.   Pt with confusion, non conpliant overnight, improvved this am.  She did not take her Lantus, insulin infusion started this Am for hyperglycemia  Lisinopril added for BP and afterload reduction.  Сергей remains in place  The pt's R foot is slightly cooler than L but the patient has no complaints, examined with Dr Suarez.  Will need to keep the PTT therapeutic.  Echo ordered and completed   f/u echo results.  Ambulation, walker and 1 assist for blalance.  R foot stable, + doppler signal, q2 hr NV checks.  Maintain therapeutic INR, coumadin/heparin  Diuresis

## 2019-11-01 NOTE — PROGRESS NOTE ADULT - PROBLEM SELECTOR PLAN 1
Will increase Lantus to 20u at bedtime, increase Humalog to 8u before each meal, and continue Humalog correction scale coverage. Will continue monitoring FS, log, and FU.  Patient counseled for compliance with consistent low carb diet and exercise as tolerated outpatient. Will continue monitoring FS, log, and FU.  Patient counseled for compliance with consistent low carb diet and exercise as tolerated outpatient.

## 2019-11-02 LAB
ALBUMIN SERPL ELPH-MCNC: 3 G/DL — LOW (ref 3.3–5)
ALBUMIN SERPL ELPH-MCNC: 3.1 G/DL — LOW (ref 3.3–5)
ALP SERPL-CCNC: 181 U/L — HIGH (ref 40–120)
ALP SERPL-CCNC: 183 U/L — HIGH (ref 40–120)
ALT FLD-CCNC: 106 U/L — HIGH (ref 10–45)
ALT FLD-CCNC: 96 U/L — HIGH (ref 10–45)
ANION GAP SERPL CALC-SCNC: 13 MMOL/L — SIGNIFICANT CHANGE UP (ref 5–17)
ANION GAP SERPL CALC-SCNC: 8 MMOL/L — SIGNIFICANT CHANGE UP (ref 5–17)
APTT BLD: 80 SEC — HIGH (ref 27.5–36.3)
APTT BLD: 80.4 SEC — HIGH (ref 27.5–36.3)
AST SERPL-CCNC: 71 U/L — HIGH (ref 10–40)
AST SERPL-CCNC: 74 U/L — HIGH (ref 10–40)
BILIRUB SERPL-MCNC: 0.8 MG/DL — SIGNIFICANT CHANGE UP (ref 0.2–1.2)
BILIRUB SERPL-MCNC: 0.9 MG/DL — SIGNIFICANT CHANGE UP (ref 0.2–1.2)
BUN SERPL-MCNC: 27 MG/DL — HIGH (ref 7–23)
BUN SERPL-MCNC: 34 MG/DL — HIGH (ref 7–23)
CALCIUM SERPL-MCNC: 8.4 MG/DL — SIGNIFICANT CHANGE UP (ref 8.4–10.5)
CALCIUM SERPL-MCNC: 8.7 MG/DL — SIGNIFICANT CHANGE UP (ref 8.4–10.5)
CHLORIDE SERPL-SCNC: 101 MMOL/L — SIGNIFICANT CHANGE UP (ref 96–108)
CHLORIDE SERPL-SCNC: 105 MMOL/L — SIGNIFICANT CHANGE UP (ref 96–108)
CO2 SERPL-SCNC: 21 MMOL/L — LOW (ref 22–31)
CO2 SERPL-SCNC: 24 MMOL/L — SIGNIFICANT CHANGE UP (ref 22–31)
CREAT SERPL-MCNC: 0.8 MG/DL — SIGNIFICANT CHANGE UP (ref 0.5–1.3)
CREAT SERPL-MCNC: 0.93 MG/DL — SIGNIFICANT CHANGE UP (ref 0.5–1.3)
GLUCOSE BLDC GLUCOMTR-MCNC: 139 MG/DL — HIGH (ref 70–99)
GLUCOSE BLDC GLUCOMTR-MCNC: 154 MG/DL — HIGH (ref 70–99)
GLUCOSE BLDC GLUCOMTR-MCNC: 157 MG/DL — HIGH (ref 70–99)
GLUCOSE BLDC GLUCOMTR-MCNC: 208 MG/DL — HIGH (ref 70–99)
GLUCOSE SERPL-MCNC: 154 MG/DL — HIGH (ref 70–99)
GLUCOSE SERPL-MCNC: 183 MG/DL — HIGH (ref 70–99)
HCT VFR BLD CALC: 30.6 % — LOW (ref 34.5–45)
HCT VFR BLD CALC: 31.2 % — LOW (ref 34.5–45)
HGB BLD-MCNC: 10 G/DL — LOW (ref 11.5–15.5)
HGB BLD-MCNC: 10.5 G/DL — LOW (ref 11.5–15.5)
INR BLD: 1.81 RATIO — HIGH (ref 0.88–1.16)
MCHC RBC-ENTMCNC: 27 PG — SIGNIFICANT CHANGE UP (ref 27–34)
MCHC RBC-ENTMCNC: 27.8 PG — SIGNIFICANT CHANGE UP (ref 27–34)
MCHC RBC-ENTMCNC: 32.7 GM/DL — SIGNIFICANT CHANGE UP (ref 32–36)
MCHC RBC-ENTMCNC: 33.7 GM/DL — SIGNIFICANT CHANGE UP (ref 32–36)
MCV RBC AUTO: 82.5 FL — SIGNIFICANT CHANGE UP (ref 80–100)
MCV RBC AUTO: 82.7 FL — SIGNIFICANT CHANGE UP (ref 80–100)
NRBC # BLD: 0 /100 WBCS — SIGNIFICANT CHANGE UP (ref 0–0)
NRBC # BLD: 0 /100 WBCS — SIGNIFICANT CHANGE UP (ref 0–0)
PLATELET # BLD AUTO: 347 K/UL — SIGNIFICANT CHANGE UP (ref 150–400)
PLATELET # BLD AUTO: 379 K/UL — SIGNIFICANT CHANGE UP (ref 150–400)
POTASSIUM SERPL-MCNC: 4.1 MMOL/L — SIGNIFICANT CHANGE UP (ref 3.5–5.3)
POTASSIUM SERPL-MCNC: 4.3 MMOL/L — SIGNIFICANT CHANGE UP (ref 3.5–5.3)
POTASSIUM SERPL-SCNC: 4.1 MMOL/L — SIGNIFICANT CHANGE UP (ref 3.5–5.3)
POTASSIUM SERPL-SCNC: 4.3 MMOL/L — SIGNIFICANT CHANGE UP (ref 3.5–5.3)
PROT SERPL-MCNC: 5.9 G/DL — LOW (ref 6–8.3)
PROT SERPL-MCNC: 6 G/DL — SIGNIFICANT CHANGE UP (ref 6–8.3)
PROTHROM AB SERPL-ACNC: 21 SEC — HIGH (ref 10–12.9)
RBC # BLD: 3.7 M/UL — LOW (ref 3.8–5.2)
RBC # BLD: 3.78 M/UL — LOW (ref 3.8–5.2)
RBC # FLD: 16.2 % — HIGH (ref 10.3–14.5)
RBC # FLD: 16.3 % — HIGH (ref 10.3–14.5)
SODIUM SERPL-SCNC: 135 MMOL/L — SIGNIFICANT CHANGE UP (ref 135–145)
SODIUM SERPL-SCNC: 137 MMOL/L — SIGNIFICANT CHANGE UP (ref 135–145)
WBC # BLD: 13.12 K/UL — HIGH (ref 3.8–10.5)
WBC # BLD: 15.47 K/UL — HIGH (ref 3.8–10.5)
WBC # FLD AUTO: 13.12 K/UL — HIGH (ref 3.8–10.5)
WBC # FLD AUTO: 15.47 K/UL — HIGH (ref 3.8–10.5)

## 2019-11-02 RX ORDER — WARFARIN SODIUM 2.5 MG/1
2 TABLET ORAL ONCE
Refills: 0 | Status: COMPLETED | OUTPATIENT
Start: 2019-11-02 | End: 2019-11-02

## 2019-11-02 RX ORDER — METOPROLOL TARTRATE 50 MG
25 TABLET ORAL EVERY 8 HOURS
Refills: 0 | Status: DISCONTINUED | OUTPATIENT
Start: 2019-11-02 | End: 2019-11-03

## 2019-11-02 RX ADMIN — INSULIN GLARGINE 20 UNIT(S): 100 INJECTION, SOLUTION SUBCUTANEOUS at 21:58

## 2019-11-02 RX ADMIN — Medication 8 UNIT(S): at 12:24

## 2019-11-02 RX ADMIN — SPIRONOLACTONE 25 MILLIGRAM(S): 25 TABLET, FILM COATED ORAL at 05:19

## 2019-11-02 RX ADMIN — Medication 1: at 17:11

## 2019-11-02 RX ADMIN — Medication 8 UNIT(S): at 07:55

## 2019-11-02 RX ADMIN — Medication 650 MILLIGRAM(S): at 11:42

## 2019-11-02 RX ADMIN — Medication 8 UNIT(S): at 17:11

## 2019-11-02 RX ADMIN — ATORVASTATIN CALCIUM 80 MILLIGRAM(S): 80 TABLET, FILM COATED ORAL at 21:58

## 2019-11-02 RX ADMIN — WARFARIN SODIUM 2 MILLIGRAM(S): 2.5 TABLET ORAL at 21:58

## 2019-11-02 RX ADMIN — Medication 25 MILLIGRAM(S): at 21:58

## 2019-11-02 RX ADMIN — CHLORHEXIDINE GLUCONATE 1 APPLICATION(S): 213 SOLUTION TOPICAL at 11:40

## 2019-11-02 RX ADMIN — Medication 25 MILLIGRAM(S): at 05:17

## 2019-11-02 RX ADMIN — Medication 2: at 12:24

## 2019-11-02 RX ADMIN — LISINOPRIL 5 MILLIGRAM(S): 2.5 TABLET ORAL at 05:17

## 2019-11-02 RX ADMIN — Medication 25 MILLIGRAM(S): at 12:24

## 2019-11-02 RX ADMIN — Medication 81 MILLIGRAM(S): at 11:42

## 2019-11-02 RX ADMIN — PANTOPRAZOLE SODIUM 40 MILLIGRAM(S): 20 TABLET, DELAYED RELEASE ORAL at 05:17

## 2019-11-02 RX ADMIN — HEPARIN SODIUM 12 UNIT(S)/HR: 5000 INJECTION INTRAVENOUS; SUBCUTANEOUS at 03:28

## 2019-11-02 RX ADMIN — HEPARIN SODIUM 12 UNIT(S)/HR: 5000 INJECTION INTRAVENOUS; SUBCUTANEOUS at 07:56

## 2019-11-02 RX ADMIN — Medication 650 MILLIGRAM(S): at 12:12

## 2019-11-02 RX ADMIN — Medication 40 MILLIGRAM(S): at 05:17

## 2019-11-02 NOTE — PROGRESS NOTE ADULT - ASSESSMENT
Assessment  DMT2: 63y Female with DM T2 with hyperglycemia, A1C 7.5%, was on oral meds, S/P CABG on insulin, FS improving, no hypoglycemic episode,  eating meals  noncompliant with diet, family brings food from home.  CAD: S/P CABG 10/25, on medications, no chest pain, stable, monitored.  HTN: Controlled,  on antihypertensive medications.  HLD: On statin, compliant with  intake.          Mireya Niño MD  Cell: [phone]  Office: [phone]

## 2019-11-02 NOTE — PROGRESS NOTE ADULT - SUBJECTIVE AND OBJECTIVE BOX
Patient  is  seen  Right  foot  is  warm pink  good  sensory and motor  function  Weak DP pulse felt No pain  in the foot Right  foot is  viable  Continue  heparin and  coumadin till theraeputic

## 2019-11-02 NOTE — PROGRESS NOTE ADULT - ASSESSMENT
Pt is a 64 yo female with DM, HTN, and CVA (requiring cane to ambulate, but no focal deficits)who presented to [facility] with a several hour history of shortness of breath and abdominal pain. Patient states these symptoms started around 4 pm yesterday to this. Prior to this day, patient denies having any similar symptoms prior to yesterday. She had an EKG at [facility] that showed ALAN in lead III and ST depressions in V2-V5. The troponing at the OSH was 1.15, nd propBNP of 4000. She was given lasix, ASA/Brillinta loaded, and started on heparin drip. She was transferred to [facility] for HLOC given concern for NSTEMI.  s/p 10/22 LHC demonstrating severe 3VD and CT Surgery consulted for CABG evaluation.  Endo for glucose control  10/25 Clipping, left atrial appendage  MVR (mitral valve replacement) 31mm tissue valve   CABG x 4  LIMA to OM, DELIA to LAD, SVG to PRICE, Radial artery Y graft to PDA off of SVG   pre-op IABP   Post op pressors, inotropes  Cardiogenic shock  VETO  Ventricular ectopy- amio load  Remains on dobutamine, poor CI/vbg when weaned off.  Acutee blood loss anemia prbc  RUE midline for access  Psych for delerium- a&o- anxiety  R foot ischemia/cold limb s/p CTA which shows small area of low flow - on Heparin gtt, vascular is following, will manage medically; starting Coumadin  Remains on  2mcg  10/31  d/c overnight.   Pt with confusion, non conpliant overnight, improvved this am.  She did not take her Lantus, insulin infusion started this Am for hyperglycemia  Lisinopril added for BP and afterload reduction.  Сергей remains in place  The pt's R foot is slightly cooler than L but the patient has no complaints, examined with Dr Suarez.  Will need to keep the PTT therapeutic.  Echo ordered and completed   f/u echo results.  Ambulation, walker and 1 assist for blalance.  R foot stable, + doppler signal, q2 hr NV checks.  Maintain therapeutic INR, coumadin/heparin, Diuresis   HD stable, remains NSR 80-90s, lopressor uptitrated 25 mg TID, trending LFT's, coumadin 2mg tonight for INR 1.8. Mediastinal DOM removed w/o difficulty.   PLAN: HOME w/ PT when stable. + dopplers RLE, cont vasc checks q4. hep gtt until INR th

## 2019-11-02 NOTE — CHART NOTE - NSCHARTNOTEFT_GEN_A_CORE
Called for confusion and complaint of tingling LLE  Foot cool, +doppler pulse   Heparin infusing  Vascular recommendations reviewed  Will send stat labs to assess PTT and ensure therapeutic level

## 2019-11-02 NOTE — PROGRESS NOTE ADULT - PROBLEM SELECTOR PLAN 1
Will continue current insulin regimen for now. Will continue monitoring FS, log, will Follow up.  Patient counseled for compliance with consistent low carb diet, exercise as tolerated as out patient.

## 2019-11-02 NOTE — PROGRESS NOTE ADULT - SUBJECTIVE AND OBJECTIVE BOX
Interval Hx; Events Overnight:  SUBJECTIVE: " I feel okay today, I have little pain, my right,  "    LABS:                10.0                 137  | 24   | 27           13.12 >-----------< 379     ------------------------< 154                   30.6                 4.1  | 105  | 0.80                                         Ca 8.7   Mg x     Ph x          PT/INR - ( 2019 07:10 )   PT: 21.0 sec;   INR: 1.81 ratio         PTT - ( 2019 07:10 )  PTT:80.4 sec    VITAL SIGNS    Telemetry:  SR 80-90s     Daily     Daily Weight in k (2019 07:58)      Vital Signs Last 24 Hrs  T(C): 36.7 (19 @ 05:25), Max: 37 (19 @ 21:41)  T(F): 98.1 (19 @ 05:25), Max: 98.6 (19 @ 21:41)  HR: 78 (19 @ 07:42) (77 - 97)  BP: 144/86 (19 @ 07:42) (130/78 - 170/94)  RR: 16 (19 @ 05:25) (16 - 18)  SpO2: 99% (19 @ 05:25) (96% - 99%)             I&O's Detail    2019 07:  -  2019 07:00  --------------------------------------------------------  IN:    heparin Infusion: 267 mL    Oral Fluid: 760 mL  Total IN: 1027 mL    OUT:    Bulb: 130 mL    Voided: 1650 mL  Total OUT: 1780 mL    Total NET: -753 mL      2019 08:01  -  2019 11:54  --------------------------------------------------------  IN:    heparin Infusion: 60 mL    Oral Fluid: 200 mL  Total IN: 260 mL    OUT:    Bulb: 20 mL    Voided: 800 mL  Total OUT: 820 mL    Total NET: -560 mL                    GLUCOSE  CAPILLARY BLOOD GLUCOSE  268 (2019 12:00)      POCT Blood Glucose.: 139 mg/dL (2019 07:52)  POCT Blood Glucose.: 163 mg/dL (2019 21:59)  POCT Blood Glucose.: 150 mg/dL (2019 16:42)  POCT Blood Glucose.: 268 mg/dL (2019 12:32)                PHYSICAL EXAM      General: NAD, well appearing, in no distress  Neurology: A&O x3, non focal, no neuro deficits. Moves all extremities to command.  CV : s1 s2 RRR, no murmurs, gallops, clicks.   Sternal Wound :  Midsternal opsite dressing, clean, dry and intact  Lungs: clear to auscultation  Abdomen: soft, nontender, nondistended, positive bowel sounds  :    voiding        Extremities:    trace b/l  edema. + pedal pulses    R pedal +dopplers warm and pink, good neuro sensory, + tingling per pt  Incision: R leg harvest site incisions cdi  Skin: intact, no lesions        MEDICATIONS  acetaminophen   Tablet .. 650 milliGRAM(s) Oral every 6 hours PRN  aspirin  chewable 81 milliGRAM(s) Oral daily  atorvastatin 80 milliGRAM(s) Oral at bedtime  chlorhexidine 2% Cloths 1 Application(s) Topical <User Schedule>  dextrose 10% Bolus 125 milliLiter(s) IV Bolus once  dextrose 10% Bolus 250 milliLiter(s) IV Bolus once  dextrose 40% Gel 15 Gram(s) Oral once PRN  dextrose 5%. 1000 milliLiter(s) IV Continuous <Continuous>  dextrose 50% Injectable 50 milliLiter(s) IV Push every 15 minutes  dextrose 50% Injectable 25 milliLiter(s) IV Push every 15 minutes  furosemide    Tablet 40 milliGRAM(s) Oral daily  glucagon  Injectable 1 milliGRAM(s) IntraMuscular once PRN  heparin  Infusion 1300 Unit(s)/Hr IV Continuous <Continuous>  insulin glargine Injectable (LANTUS) 20 Unit(s) SubCutaneous at bedtime  insulin lispro (HumaLOG) corrective regimen sliding scale   SubCutaneous three times a day before meals  insulin lispro (HumaLOG) corrective regimen sliding scale   SubCutaneous at bedtime  insulin lispro Injectable (HumaLOG) 8 Unit(s) SubCutaneous three times a day before meals  lisinopril 5 milliGRAM(s) Oral daily  metoprolol tartrate 25 milliGRAM(s) Oral every 8 hours  pantoprazole    Tablet 40 milliGRAM(s) Oral before breakfast  polyethylene glycol 3350 17 Gram(s) Oral daily  spironolactone 25 milliGRAM(s) Oral daily  warfarin 2 milliGRAM(s) Oral once    < from: TTE with Doppler (w/Cont) (10.31.19 @ 09:29) >  Conclusions:  1. Normal left ventricular internal dimensions and wall  thicknesses.  2. Mild segmental left ventricular systolic dysfunction.  Hypokinesis of the apical septum, inferolateral wall.  Endocardial visualization enhanced with intravenous  injection of Ultrasonic Enhancing Agent (Definity).  3. Decreased right ventricular systolic function.  4. Estimated pulmonary artery systolic pressure equals 34  mm Hg, assuming right atrial pressure equals 8  mm Hg,  consistent with normal pulmonary pressures.  ------------------------------------------------------------------------  Confirmed on  10/31/2019 - 15:04:53 by Alcon Melo M.D.    < end of copied text > Interval Hx; Events Overnight:  SUBJECTIVE: " I feel okay today, I have little pain, my right,  "    LABS:                10.0                 137  | 24   | 27           13.12 >-----------< 379     ------------------------< 154                   30.6                 4.1  | 105  | 0.80                                         Ca 8.7   Mg x     Ph x          PT/INR - ( 2019 07:10 )   PT: 21.0 sec;   INR: 1.81 ratio         PTT - ( 2019 07:10 )  PTT:80.4 sec    VITAL SIGNS    Telemetry:  SR 80-90s     Daily     Daily Weight in k (2019 07:58)      Vital Signs Last 24 Hrs  T(C): 36.7 (19 @ 05:25), Max: 37 (19 @ 21:41)  T(F): 98.1 (19 @ 05:25), Max: 98.6 (19 @ 21:41)  HR: 78 (19 @ 07:42) (77 - 97)  BP: 144/86 (19 @ 07:42) (130/78 - 170/94)  RR: 16 (19 @ 05:25) (16 - 18)  SpO2: 99% (19 @ 05:25) (96% - 99%)             I&O's Detail    2019 07:  -  2019 07:00  --------------------------------------------------------  IN:    heparin Infusion: 267 mL    Oral Fluid: 760 mL  Total IN: 1027 mL    OUT:    Bulb: 130 mL    Voided: 1650 mL  Total OUT: 1780 mL    Total NET: -753 mL      2019 08:01  -  2019 11:54  --------------------------------------------------------  IN:    heparin Infusion: 60 mL    Oral Fluid: 200 mL  Total IN: 260 mL    OUT:    Bulb: 20 mL    Voided: 800 mL  Total OUT: 820 mL    Total NET: -560 mL                    GLUCOSE  CAPILLARY BLOOD GLUCOSE  268 (2019 12:00)      POCT Blood Glucose.: 139 mg/dL (2019 07:52)  POCT Blood Glucose.: 163 mg/dL (2019 21:59)  POCT Blood Glucose.: 150 mg/dL (2019 16:42)  POCT Blood Glucose.: 268 mg/dL (2019 12:32)                PHYSICAL EXAM      General: NAD, well appearing, in no distress  Neurology: A&O x3, non focal, no neuro deficits. Moves all extremities to command.  CV : s1 s2 RRR, no murmurs, gallops, clicks.   Sternal Wound :  Midsternal opsite dressing, clean, dry and intact  Lungs: clear to auscultation  Abdomen: soft, nontender, nondistended, positive bowel sounds  :   voiding        Extremities:    trace b/l  edema. + pedal pulses    R pedal +dopplers warm and pink, good neuro sensory, + tingling per pt  Incision: R leg harvest site incisions cdi  Skin: intact, no lesions RUE midline        MEDICATIONS  acetaminophen   Tablet .. 650 milliGRAM(s) Oral every 6 hours PRN  aspirin  chewable 81 milliGRAM(s) Oral daily  atorvastatin 80 milliGRAM(s) Oral at bedtime  chlorhexidine 2% Cloths 1 Application(s) Topical <User Schedule>  dextrose 10% Bolus 125 milliLiter(s) IV Bolus once  dextrose 10% Bolus 250 milliLiter(s) IV Bolus once  dextrose 40% Gel 15 Gram(s) Oral once PRN  dextrose 5%. 1000 milliLiter(s) IV Continuous <Continuous>  dextrose 50% Injectable 50 milliLiter(s) IV Push every 15 minutes  dextrose 50% Injectable 25 milliLiter(s) IV Push every 15 minutes  furosemide    Tablet 40 milliGRAM(s) Oral daily  glucagon  Injectable 1 milliGRAM(s) IntraMuscular once PRN  heparin  Infusion 1300 Unit(s)/Hr IV Continuous <Continuous>  insulin glargine Injectable (LANTUS) 20 Unit(s) SubCutaneous at bedtime  insulin lispro (HumaLOG) corrective regimen sliding scale   SubCutaneous three times a day before meals  insulin lispro (HumaLOG) corrective regimen sliding scale   SubCutaneous at bedtime  insulin lispro Injectable (HumaLOG) 8 Unit(s) SubCutaneous three times a day before meals  lisinopril 5 milliGRAM(s) Oral daily  metoprolol tartrate 25 milliGRAM(s) Oral every 8 hours  pantoprazole    Tablet 40 milliGRAM(s) Oral before breakfast  polyethylene glycol 3350 17 Gram(s) Oral daily  spironolactone 25 milliGRAM(s) Oral daily  warfarin 2 milliGRAM(s) Oral once    < from: TTE with Doppler (w/Cont) (10.31.19 @ 09:29) >  Conclusions:  1. Normal left ventricular internal dimensions and wall  thicknesses.  2. Mild segmental left ventricular systolic dysfunction.  Hypokinesis of the apical septum, inferolateral wall.  Endocardial visualization enhanced with intravenous  injection of Ultrasonic Enhancing Agent (Definity).  3. Decreased right ventricular systolic function.  4. Estimated pulmonary artery systolic pressure equals 34  mm Hg, assuming right atrial pressure equals 8  mm Hg,  consistent with normal pulmonary pressures.  ------------------------------------------------------------------------  Confirmed on  10/31/2019 - 15:04:53 by Alcon Melo M.D.    < end of copied text >

## 2019-11-02 NOTE — PROGRESS NOTE ADULT - SUBJECTIVE AND OBJECTIVE BOX
Chief complaint  Patient is a 63y old  Female who presents with a chief complaint of NSTEMI (02 Nov 2019 11:53)   Review of systems  Patient in bed, looks comfortable, no fever, no hypoglycemia.    Labs and Fingersticks  CAPILLARY BLOOD GLUCOSE      POCT Blood Glucose.: 154 mg/dL (02 Nov 2019 16:55)  POCT Blood Glucose.: 208 mg/dL (02 Nov 2019 12:04)  POCT Blood Glucose.: 139 mg/dL (02 Nov 2019 07:52)  POCT Blood Glucose.: 163 mg/dL (01 Nov 2019 21:59)      Anion Gap, Serum: 8 (11-02 @ 06:57)  Anion Gap, Serum: 13 (11-02 @ 00:30)  Anion Gap, Serum: 12 (11-01 @ 03:49)      Calcium, Total Serum: 8.7 (11-02 @ 06:57)  Calcium, Total Serum: 8.4 (11-02 @ 00:30)  Calcium, Total Serum: 8.2 <L> (11-01 @ 03:49)  Albumin, Serum: 3.0 <L> (11-02 @ 06:57)  Albumin, Serum: 3.1 <L> (11-02 @ 00:30)  Albumin, Serum: 2.6 <L> (11-01 @ 03:49)    Alanine Aminotransferase (ALT/SGPT): 106 <H> (11-02 @ 06:57)  Alanine Aminotransferase (ALT/SGPT): 96 <H> (11-02 @ 00:30)  Alanine Aminotransferase (ALT/SGPT): 77 <H> (11-01 @ 03:49)  Alkaline Phosphatase, Serum: 181 <H> (11-02 @ 06:57)  Alkaline Phosphatase, Serum: 183 <H> (11-02 @ 00:30)  Alkaline Phosphatase, Serum: 151 <H> (11-01 @ 03:49)  Aspartate Aminotransferase (AST/SGOT): 71 <H> (11-02 @ 06:57)  Aspartate Aminotransferase (AST/SGOT): 74 <H> (11-02 @ 00:30)  Aspartate Aminotransferase (AST/SGOT): 60 <H> (11-01 @ 03:49)    11-02    137  |  105  |  27<H>  ----------------------------<  154<H>  4.1   |  24  |  0.80    Ca    8.7      02 Nov 2019 06:57    TPro  6.0  /  Alb  3.0<L>  /  TBili  0.8  /  DBili  x   /  AST  71<H>  /  ALT  106<H>  /  AlkPhos  181<H>  11-02                        10.0   13.12 )-----------( 379      ( 02 Nov 2019 06:57 )             30.6     Medications  MEDICATIONS  (STANDING):  aspirin  chewable 81 milliGRAM(s) Oral daily  atorvastatin 80 milliGRAM(s) Oral at bedtime  chlorhexidine 2% Cloths 1 Application(s) Topical <User Schedule>  dextrose 10% Bolus 125 milliLiter(s) IV Bolus once  dextrose 10% Bolus 250 milliLiter(s) IV Bolus once  dextrose 5%. 1000 milliLiter(s) (50 mL/Hr) IV Continuous <Continuous>  dextrose 50% Injectable 50 milliLiter(s) IV Push every 15 minutes  dextrose 50% Injectable 25 milliLiter(s) IV Push every 15 minutes  furosemide    Tablet 40 milliGRAM(s) Oral daily  heparin  Infusion 1300 Unit(s)/Hr (12 mL/Hr) IV Continuous <Continuous>  insulin glargine Injectable (LANTUS) 20 Unit(s) SubCutaneous at bedtime  insulin lispro (HumaLOG) corrective regimen sliding scale   SubCutaneous three times a day before meals  insulin lispro (HumaLOG) corrective regimen sliding scale   SubCutaneous at bedtime  insulin lispro Injectable (HumaLOG) 8 Unit(s) SubCutaneous three times a day before meals  lisinopril 5 milliGRAM(s) Oral daily  metoprolol tartrate 25 milliGRAM(s) Oral every 8 hours  pantoprazole    Tablet 40 milliGRAM(s) Oral before breakfast  polyethylene glycol 3350 17 Gram(s) Oral daily  spironolactone 25 milliGRAM(s) Oral daily  warfarin 2 milliGRAM(s) Oral once      Physical Exam  General: Patient comfortable in bed  Vital Signs Last 12 Hrs  T(F): 97.8 (11-02-19 @ 12:10), Max: 98.1 (11-02-19 @ 05:25)  HR: 87 (11-02-19 @ 12:10) (78 - 97)  BP: 132/77 (11-02-19 @ 12:10) (132/77 - 170/94)  BP(mean): 105 (11-02-19 @ 07:42) (105 - 105)  RR: 18 (11-02-19 @ 12:10) (16 - 18)  SpO2: 98% (11-02-19 @ 12:10) (98% - 99%)  Neck: No palpable thyroid nodules.  CVS: S1S2, No murmurs  Respiratory: No wheezing, no crepitations  GI: Abdomen soft, bowel sounds positive  Musculoskeletal:  edema lower extremities.   Skin: No skin rashes, no ecchymosis    Diagnostics

## 2019-11-02 NOTE — PROGRESS NOTE ADULT - PROBLEM SELECTOR PLAN 1
Asa, Statin,  Chest PT,  Incentive spirometry, wound care and assessment.  Ambulate   Cont inotropes  Shower pod #5 postop care per CT surgery  Asa, Statin,  Chest PT,  Incentive spirometry, wound care and assessment.  Ambulate   Trend LFT's   Home w/ PT when cleared.

## 2019-11-03 DIAGNOSIS — R74.0 NONSPECIFIC ELEVATION OF LEVELS OF TRANSAMINASE AND LACTIC ACID DEHYDROGENASE [LDH]: ICD-10-CM

## 2019-11-03 DIAGNOSIS — Z95.2 PRESENCE OF PROSTHETIC HEART VALVE: ICD-10-CM

## 2019-11-03 DIAGNOSIS — I82.90 ACUTE EMBOLISM AND THROMBOSIS OF UNSPECIFIED VEIN: ICD-10-CM

## 2019-11-03 LAB
ALBUMIN SERPL ELPH-MCNC: 2.9 G/DL — LOW (ref 3.3–5)
ALP SERPL-CCNC: 193 U/L — HIGH (ref 40–120)
ALT FLD-CCNC: 87 U/L — HIGH (ref 10–45)
ANION GAP SERPL CALC-SCNC: 13 MMOL/L — SIGNIFICANT CHANGE UP (ref 5–17)
APTT BLD: 94.1 SEC — HIGH (ref 27.5–36.3)
AST SERPL-CCNC: 45 U/L — HIGH (ref 10–40)
BILIRUB SERPL-MCNC: 0.7 MG/DL — SIGNIFICANT CHANGE UP (ref 0.2–1.2)
BUN SERPL-MCNC: 28 MG/DL — HIGH (ref 7–23)
CALCIUM SERPL-MCNC: 8.6 MG/DL — SIGNIFICANT CHANGE UP (ref 8.4–10.5)
CHLORIDE SERPL-SCNC: 103 MMOL/L — SIGNIFICANT CHANGE UP (ref 96–108)
CO2 SERPL-SCNC: 18 MMOL/L — LOW (ref 22–31)
CREAT SERPL-MCNC: 0.75 MG/DL — SIGNIFICANT CHANGE UP (ref 0.5–1.3)
GLUCOSE BLDC GLUCOMTR-MCNC: 142 MG/DL — HIGH (ref 70–99)
GLUCOSE BLDC GLUCOMTR-MCNC: 168 MG/DL — HIGH (ref 70–99)
GLUCOSE BLDC GLUCOMTR-MCNC: 174 MG/DL — HIGH (ref 70–99)
GLUCOSE BLDC GLUCOMTR-MCNC: 205 MG/DL — HIGH (ref 70–99)
GLUCOSE SERPL-MCNC: 175 MG/DL — HIGH (ref 70–99)
HCT VFR BLD CALC: 35.1 % — SIGNIFICANT CHANGE UP (ref 34.5–45)
HGB BLD-MCNC: 11.1 G/DL — LOW (ref 11.5–15.5)
INR BLD: 1.91 RATIO — HIGH (ref 0.88–1.16)
INR BLD: 2.03 RATIO — HIGH (ref 0.88–1.16)
MCHC RBC-ENTMCNC: 27.4 PG — SIGNIFICANT CHANGE UP (ref 27–34)
MCHC RBC-ENTMCNC: 31.6 GM/DL — LOW (ref 32–36)
MCV RBC AUTO: 86.7 FL — SIGNIFICANT CHANGE UP (ref 80–100)
NRBC # BLD: 0 /100 WBCS — SIGNIFICANT CHANGE UP (ref 0–0)
PLATELET # BLD AUTO: 475 K/UL — HIGH (ref 150–400)
POTASSIUM SERPL-MCNC: 4.4 MMOL/L — SIGNIFICANT CHANGE UP (ref 3.5–5.3)
POTASSIUM SERPL-SCNC: 4.4 MMOL/L — SIGNIFICANT CHANGE UP (ref 3.5–5.3)
PROT SERPL-MCNC: 6.2 G/DL — SIGNIFICANT CHANGE UP (ref 6–8.3)
PROTHROM AB SERPL-ACNC: 22.2 SEC — HIGH (ref 10–12.9)
PROTHROM AB SERPL-ACNC: 23.6 SEC — HIGH (ref 10–12.9)
RBC # BLD: 4.05 M/UL — SIGNIFICANT CHANGE UP (ref 3.8–5.2)
RBC # FLD: 16.9 % — HIGH (ref 10.3–14.5)
SODIUM SERPL-SCNC: 134 MMOL/L — LOW (ref 135–145)
WBC # BLD: 16.83 K/UL — HIGH (ref 3.8–10.5)
WBC # FLD AUTO: 16.83 K/UL — HIGH (ref 3.8–10.5)

## 2019-11-03 PROCEDURE — 71045 X-RAY EXAM CHEST 1 VIEW: CPT | Mod: 26

## 2019-11-03 RX ORDER — WARFARIN SODIUM 2.5 MG/1
2 TABLET ORAL ONCE
Refills: 0 | Status: COMPLETED | OUTPATIENT
Start: 2019-11-03 | End: 2019-11-03

## 2019-11-03 RX ORDER — METOPROLOL TARTRATE 50 MG
50 TABLET ORAL
Refills: 0 | Status: DISCONTINUED | OUTPATIENT
Start: 2019-11-03 | End: 2019-11-04

## 2019-11-03 RX ADMIN — WARFARIN SODIUM 2 MILLIGRAM(S): 2.5 TABLET ORAL at 21:58

## 2019-11-03 RX ADMIN — INSULIN GLARGINE 20 UNIT(S): 100 INJECTION, SOLUTION SUBCUTANEOUS at 21:58

## 2019-11-03 RX ADMIN — Medication 50 MILLIGRAM(S): at 17:02

## 2019-11-03 RX ADMIN — Medication 8 UNIT(S): at 12:08

## 2019-11-03 RX ADMIN — Medication 8 UNIT(S): at 17:01

## 2019-11-03 RX ADMIN — Medication 40 MILLIGRAM(S): at 06:39

## 2019-11-03 RX ADMIN — PANTOPRAZOLE SODIUM 40 MILLIGRAM(S): 20 TABLET, DELAYED RELEASE ORAL at 06:39

## 2019-11-03 RX ADMIN — ATORVASTATIN CALCIUM 80 MILLIGRAM(S): 80 TABLET, FILM COATED ORAL at 21:58

## 2019-11-03 RX ADMIN — Medication 8 UNIT(S): at 08:36

## 2019-11-03 RX ADMIN — LISINOPRIL 5 MILLIGRAM(S): 2.5 TABLET ORAL at 06:39

## 2019-11-03 RX ADMIN — Medication 2: at 12:08

## 2019-11-03 RX ADMIN — SPIRONOLACTONE 25 MILLIGRAM(S): 25 TABLET, FILM COATED ORAL at 06:39

## 2019-11-03 RX ADMIN — Medication 81 MILLIGRAM(S): at 12:08

## 2019-11-03 RX ADMIN — Medication 1: at 17:00

## 2019-11-03 RX ADMIN — Medication 25 MILLIGRAM(S): at 06:41

## 2019-11-03 NOTE — PROGRESS NOTE ADULT - SUBJECTIVE AND OBJECTIVE BOX
Chief complaint  Patient is a 63y old  Female who presents with a chief complaint of NSTEMI  10/25 MVR C4 BIMA (03 Nov 2019 15:10)   Review of systems  Patient in bed, looks comfortable, no fever, no hypoglycemia.    Labs and Fingersticks  CAPILLARY BLOOD GLUCOSE      POCT Blood Glucose.: 168 mg/dL (03 Nov 2019 16:58)  POCT Blood Glucose.: 205 mg/dL (03 Nov 2019 12:01)  POCT Blood Glucose.: 142 mg/dL (03 Nov 2019 08:09)      Anion Gap, Serum: 13 (11-03 @ 06:43)  Anion Gap, Serum: 8 (11-02 @ 06:57)  Anion Gap, Serum: 13 (11-02 @ 00:30)      Calcium, Total Serum: 8.6 (11-03 @ 06:43)  Calcium, Total Serum: 8.7 (11-02 @ 06:57)  Calcium, Total Serum: 8.4 (11-02 @ 00:30)  Albumin, Serum: 2.9 <L> (11-03 @ 06:43)  Albumin, Serum: 3.0 <L> (11-02 @ 06:57)  Albumin, Serum: 3.1 <L> (11-02 @ 00:30)    Alanine Aminotransferase (ALT/SGPT): 87 <H> (11-03 @ 06:43)  Alanine Aminotransferase (ALT/SGPT): 106 <H> (11-02 @ 06:57)  Alanine Aminotransferase (ALT/SGPT): 96 <H> (11-02 @ 00:30)  Alkaline Phosphatase, Serum: 193 <H> (11-03 @ 06:43)  Alkaline Phosphatase, Serum: 181 <H> (11-02 @ 06:57)  Alkaline Phosphatase, Serum: 183 <H> (11-02 @ 00:30)  Aspartate Aminotransferase (AST/SGOT): 45 <H> (11-03 @ 06:43)  Aspartate Aminotransferase (AST/SGOT): 71 <H> (11-02 @ 06:57)  Aspartate Aminotransferase (AST/SGOT): 74 <H> (11-02 @ 00:30)        11-03    134<L>  |  103  |  28<H>  ----------------------------<  175<H>  4.4   |  18<L>  |  0.75    Ca    8.6      03 Nov 2019 06:43    TPro  6.2  /  Alb  2.9<L>  /  TBili  0.7  /  DBili  x   /  AST  45<H>  /  ALT  87<H>  /  AlkPhos  193<H>  11-03                        11.1   16.83 )-----------( 475      ( 03 Nov 2019 06:43 )             35.1     Medications  MEDICATIONS  (STANDING):  aspirin  chewable 81 milliGRAM(s) Oral daily  atorvastatin 80 milliGRAM(s) Oral at bedtime  chlorhexidine 2% Cloths 1 Application(s) Topical <User Schedule>  dextrose 10% Bolus 125 milliLiter(s) IV Bolus once  dextrose 10% Bolus 250 milliLiter(s) IV Bolus once  dextrose 5%. 1000 milliLiter(s) (50 mL/Hr) IV Continuous <Continuous>  dextrose 50% Injectable 50 milliLiter(s) IV Push every 15 minutes  dextrose 50% Injectable 25 milliLiter(s) IV Push every 15 minutes  furosemide    Tablet 40 milliGRAM(s) Oral daily  insulin glargine Injectable (LANTUS) 20 Unit(s) SubCutaneous at bedtime  insulin lispro (HumaLOG) corrective regimen sliding scale   SubCutaneous three times a day before meals  insulin lispro (HumaLOG) corrective regimen sliding scale   SubCutaneous at bedtime  insulin lispro Injectable (HumaLOG) 8 Unit(s) SubCutaneous three times a day before meals  lisinopril 5 milliGRAM(s) Oral daily  metoprolol tartrate 50 milliGRAM(s) Oral two times a day  pantoprazole    Tablet 40 milliGRAM(s) Oral before breakfast  polyethylene glycol 3350 17 Gram(s) Oral daily  spironolactone 25 milliGRAM(s) Oral daily  warfarin 2 milliGRAM(s) Oral once      Physical Exam  General: Patient comfortable in bed  Vital Signs Last 12 Hrs  T(F): 97.8 (11-03-19 @ 13:09), Max: 97.8 (11-03-19 @ 13:09)  HR: 90 (11-03-19 @ 13:09) (90 - 90)  BP: 129/77 (11-03-19 @ 13:09) (129/77 - 129/77)  BP(mean): --  RR: 18 (11-03-19 @ 13:09) (18 - 18)  SpO2: 99% (11-03-19 @ 13:09) (99% - 99%)  Neck: No palpable thyroid nodules.  CVS: S1S2, No murmurs  Respiratory: No wheezing, no crepitations  GI: Abdomen soft, bowel sounds positive  Musculoskeletal:  edema lower extremities.   Skin: No skin rashes, no ecchymosis    Diagnostics

## 2019-11-03 NOTE — PROGRESS NOTE ADULT - PROBLEM SELECTOR PLAN 1
postop care per CT surgery  Asa, Statin,  betablocker CAD  Increase betablocker HR control  lisinopril HTN  Incentive spirometry, wound care and assessment.  Ambulate   Trend LFT's   Home w/ PT when cleared.

## 2019-11-03 NOTE — PROGRESS NOTE ADULT - ASSESSMENT
Pt is a 64 yo female with DM, HTN, and CVA (requiring cane to ambulate, but no focal deficits)who presented to [facility], [place] with a several hour history of shortness of breath and abdominal pain. Patient states these symptoms started around 4 pm yesterday to this. Prior to this day, patient denies having any similar symptoms prior to yesterday. She had an EKG at [facility] that showed ALAN in lead III and ST depressions in V2-V5. The troponing at the OSH was 1.15, nd propBNP of 4000. She was given lasix, ASA/Brillinta loaded, and started on heparin drip. She was transferred to [facility] for HLOC given concern for NSTEMI.  s/p 10/22 LHC demonstrating severe 3VD and CT Surgery consulted for CABG evaluation.  Endo for glucose control  10/25 Clipping, left atrial appendage  MVR (mitral valve replacement) 31mm tissue valve   CABG x 4  LIMA to OM, DELIA to LAD, SVG to PRICE, Radial artery Y graft to PDA off of SVG   pre-op IABP   Post op pressors, inotropes  Cardiogenic shock  VETO  Ventricular ectopy- amio load  Remains on dobutamine, poor CI/vbg when weaned off.  Acutee blood loss anemia prbc  RUE midline for access  Psych for delerium- a&o- anxiety  R foot ischemia/cold limb s/p CTA which shows small area of low flow - on Heparin gtt, vascular is following, will manage medically; starting Coumadin  Remains on  2mcg  10/31  d/c overnight.   Pt with confusion, non conpliant overnight, improvved this am.  She did not take her Lantus, insulin infusion started this Am for hyperglycemia  Lisinopril added for BP and afterload reduction.  Сергей remains in place  The pt's R foot is slightly cooler than L but the patient has no complaints, examined with Dr Suarez.  Will need to keep the PTT therapeutic.  Echo ordered and completed   f/u echo results.  Ambulation, walker and 1 assist for blalance.  R foot stable, + doppler signal, q2 hr NV checks.  Maintain therapeutic INR, coumadin/heparin, Diuresis   HD stable, remains NSR 80-90s, lopressor uptitrated 25 mg TID, trending LFT's, coumadin 2mg tonight for INR 1.8. Mediastinal DOM removed w/o difficulty.   PLAN: HOME w/ PT when stable. + dopplers RLE, cont vasc checks q4. hep gtt until INR th  11/3 INR  1.9  Dose coumadin, Hep gtt d/c

## 2019-11-03 NOTE — PROGRESS NOTE ADULT - ASSESSMENT
Assessment  DMT2: 63y Female with DM T2 with hyperglycemia, A1C 7.5%, was on oral meds, S/P CABG on insulin, blood sugars in acceptable range,  no hypoglycemic episode,  eating meals  noncompliant with diet, family brings food from home.  CAD: S/P CABG 10/25, on medications, no chest pain, stable, monitored.  HTN: Controlled,  on antihypertensive medications.  HLD: On statin, compliant with  intake.          Mireya Niño MD  Cell: [phone]  Office: [phone]

## 2019-11-04 DIAGNOSIS — R73.9 HYPERGLYCEMIA, UNSPECIFIED: ICD-10-CM

## 2019-11-04 LAB
ANION GAP SERPL CALC-SCNC: 10 MMOL/L — SIGNIFICANT CHANGE UP (ref 5–17)
APTT BLD: 35 SEC — SIGNIFICANT CHANGE UP (ref 27.5–36.3)
APTT BLD: 59.6 SEC — HIGH (ref 27.5–36.3)
APTT BLD: 74.1 SEC — HIGH (ref 27.5–36.3)
BUN SERPL-MCNC: 28 MG/DL — HIGH (ref 7–23)
CALCIUM SERPL-MCNC: 8.6 MG/DL — SIGNIFICANT CHANGE UP (ref 8.4–10.5)
CHLORIDE SERPL-SCNC: 101 MMOL/L — SIGNIFICANT CHANGE UP (ref 96–108)
CO2 SERPL-SCNC: 23 MMOL/L — SIGNIFICANT CHANGE UP (ref 22–31)
CREAT SERPL-MCNC: 0.85 MG/DL — SIGNIFICANT CHANGE UP (ref 0.5–1.3)
GLUCOSE BLDC GLUCOMTR-MCNC: 171 MG/DL — HIGH (ref 70–99)
GLUCOSE BLDC GLUCOMTR-MCNC: 190 MG/DL — HIGH (ref 70–99)
GLUCOSE BLDC GLUCOMTR-MCNC: 242 MG/DL — HIGH (ref 70–99)
GLUCOSE BLDC GLUCOMTR-MCNC: 321 MG/DL — HIGH (ref 70–99)
GLUCOSE SERPL-MCNC: 191 MG/DL — HIGH (ref 70–99)
HCT VFR BLD CALC: 31.1 % — LOW (ref 34.5–45)
HGB BLD-MCNC: 10.1 G/DL — LOW (ref 11.5–15.5)
INR BLD: 1.54 RATIO — HIGH (ref 0.88–1.16)
INR BLD: 1.54 RATIO — HIGH (ref 0.88–1.16)
INR BLD: 1.69 RATIO — HIGH (ref 0.88–1.16)
MCHC RBC-ENTMCNC: 27.1 PG — SIGNIFICANT CHANGE UP (ref 27–34)
MCHC RBC-ENTMCNC: 32.5 GM/DL — SIGNIFICANT CHANGE UP (ref 32–36)
MCV RBC AUTO: 83.4 FL — SIGNIFICANT CHANGE UP (ref 80–100)
NRBC # BLD: 0 /100 WBCS — SIGNIFICANT CHANGE UP (ref 0–0)
PLATELET # BLD AUTO: 460 K/UL — HIGH (ref 150–400)
POTASSIUM SERPL-MCNC: 4 MMOL/L — SIGNIFICANT CHANGE UP (ref 3.5–5.3)
POTASSIUM SERPL-SCNC: 4 MMOL/L — SIGNIFICANT CHANGE UP (ref 3.5–5.3)
PROTHROM AB SERPL-ACNC: 17.8 SEC — HIGH (ref 10–12.9)
PROTHROM AB SERPL-ACNC: 17.9 SEC — HIGH (ref 10–12.9)
PROTHROM AB SERPL-ACNC: 19.8 SEC — HIGH (ref 10–12.9)
RBC # BLD: 3.73 M/UL — LOW (ref 3.8–5.2)
RBC # FLD: 16.9 % — HIGH (ref 10.3–14.5)
SODIUM SERPL-SCNC: 134 MMOL/L — LOW (ref 135–145)
WBC # BLD: 15.26 K/UL — HIGH (ref 3.8–10.5)
WBC # FLD AUTO: 15.26 K/UL — HIGH (ref 3.8–10.5)

## 2019-11-04 PROCEDURE — 71045 X-RAY EXAM CHEST 1 VIEW: CPT | Mod: 26

## 2019-11-04 PROCEDURE — 93010 ELECTROCARDIOGRAM REPORT: CPT

## 2019-11-04 RX ORDER — INSULIN GLARGINE 100 [IU]/ML
24 INJECTION, SOLUTION SUBCUTANEOUS AT BEDTIME
Refills: 0 | Status: DISCONTINUED | OUTPATIENT
Start: 2019-11-04 | End: 2019-11-08

## 2019-11-04 RX ORDER — WARFARIN SODIUM 2.5 MG/1
5 TABLET ORAL ONCE
Refills: 0 | Status: DISCONTINUED | OUTPATIENT
Start: 2019-11-04 | End: 2019-11-04

## 2019-11-04 RX ORDER — METOPROLOL TARTRATE 50 MG
50 TABLET ORAL ONCE
Refills: 0 | Status: COMPLETED | OUTPATIENT
Start: 2019-11-04 | End: 2019-11-04

## 2019-11-04 RX ORDER — HEPARIN SODIUM 5000 [USP'U]/ML
1200 INJECTION INTRAVENOUS; SUBCUTANEOUS
Qty: 25000 | Refills: 0 | Status: DISCONTINUED | OUTPATIENT
Start: 2019-11-04 | End: 2019-11-05

## 2019-11-04 RX ORDER — WARFARIN SODIUM 2.5 MG/1
5 TABLET ORAL ONCE
Refills: 0 | Status: COMPLETED | OUTPATIENT
Start: 2019-11-04 | End: 2019-11-04

## 2019-11-04 RX ORDER — INSULIN LISPRO 100/ML
10 VIAL (ML) SUBCUTANEOUS
Refills: 0 | Status: DISCONTINUED | OUTPATIENT
Start: 2019-11-04 | End: 2019-11-06

## 2019-11-04 RX ORDER — METOPROLOL TARTRATE 50 MG
50 TABLET ORAL DAILY
Refills: 0 | Status: DISCONTINUED | OUTPATIENT
Start: 2019-11-05 | End: 2019-11-06

## 2019-11-04 RX ADMIN — Medication 81 MILLIGRAM(S): at 12:24

## 2019-11-04 RX ADMIN — SPIRONOLACTONE 25 MILLIGRAM(S): 25 TABLET, FILM COATED ORAL at 05:54

## 2019-11-04 RX ADMIN — INSULIN GLARGINE 24 UNIT(S): 100 INJECTION, SOLUTION SUBCUTANEOUS at 22:24

## 2019-11-04 RX ADMIN — Medication 4: at 12:23

## 2019-11-04 RX ADMIN — Medication 8 UNIT(S): at 12:23

## 2019-11-04 RX ADMIN — Medication 10 UNIT(S): at 17:08

## 2019-11-04 RX ADMIN — Medication 1: at 17:07

## 2019-11-04 RX ADMIN — Medication 2: at 08:24

## 2019-11-04 RX ADMIN — ATORVASTATIN CALCIUM 80 MILLIGRAM(S): 80 TABLET, FILM COATED ORAL at 22:23

## 2019-11-04 RX ADMIN — LISINOPRIL 5 MILLIGRAM(S): 2.5 TABLET ORAL at 05:54

## 2019-11-04 RX ADMIN — Medication 50 MILLIGRAM(S): at 05:54

## 2019-11-04 RX ADMIN — WARFARIN SODIUM 5 MILLIGRAM(S): 2.5 TABLET ORAL at 22:23

## 2019-11-04 RX ADMIN — Medication 8 UNIT(S): at 08:24

## 2019-11-04 RX ADMIN — Medication 50 MILLIGRAM(S): at 17:07

## 2019-11-04 RX ADMIN — PANTOPRAZOLE SODIUM 40 MILLIGRAM(S): 20 TABLET, DELAYED RELEASE ORAL at 05:54

## 2019-11-04 RX ADMIN — Medication 40 MILLIGRAM(S): at 05:54

## 2019-11-04 NOTE — PROGRESS NOTE ADULT - SUBJECTIVE AND OBJECTIVE BOX
Vascular Surgery Progress Note    Interval: No acute overnight events.    SUBJECTIVE: Patient seen and examined at the bedside. Feeling well this morning. Tolerating diet without nausea or vomiting. Denies any pain or numbness or weakness in RLE, ambulating well    VITALS  T(C): 36.7 (11-04-19 @ 05:10), Max: 36.7 (11-04-19 @ 05:10)  HR: 93 (11-04-19 @ 05:10) (85 - 93)  BP: 132/78 (11-04-19 @ 05:10) (129/77 - 132/78)  RR: 17 (11-04-19 @ 05:10) (17 - 18)  SpO2: 100% (11-04-19 @ 05:10) (99% - 100%)  CAPILLARY BLOOD GLUCOSE      POCT Blood Glucose.: 174 mg/dL (03 Nov 2019 21:51)  POCT Blood Glucose.: 168 mg/dL (03 Nov 2019 16:58)  POCT Blood Glucose.: 205 mg/dL (03 Nov 2019 12:01)  POCT Blood Glucose.: 142 mg/dL (03 Nov 2019 08:09)    Is/Os    11-03 @ 07:01  -  11-04 @ 07:00  --------------------------------------------------------  IN:    Oral Fluid: 1380 mL  Total IN: 1380 mL    OUT:    Voided: 3000 mL  Total OUT: 3000 mL    Total NET: -1620 mL    PHYSICAL EXAM:  General: NAD, Lying in bed comfortably, alert, oriented, no delirium this morning  Chest/Respiratory: nonlabored on RA, Sternal dressings are clean and dry  Extremities: no edema, Dressings to RLE are clean and dry. DP and PT signals, faint DP pulse on RLE, palpable DP and PT pulses on LLE. RLE noted to be slightly cooler to touch than RLE today from mid calf distally. 5/5 motor in RLE    LABS  CBC (11-04 @ 06:56)                              10.1<L>                         15.26<H>  )----------------(  460<H>     --    % Neutrophils, --    % Lymphocytes, ANC: --                                  31.1<L>  CBC (11-03 @ 06:43)                              11.1<L>                         16.83<H>  )----------------(  475<H>     --    % Neutrophils, --    % Lymphocytes, ANC: --                                  35.1      BMP (11-03 @ 06:43)             134<L>  |  103     |  28<H> 		Ca++ --      Ca 8.6                ---------------------------------( 175<H>		Mg --                 4.4     |  18<L>   |  0.75  			Ph --        LFTs (11-03 @ 06:43)      TPro 6.2 / Alb 2.9<L> / TBili 0.7 / DBili -- / AST 45<H> / ALT 87<H> / AlkPhos 193<H>    Coags (11-04 @ 06:56)  aPTT -- / INR 1.54<H> / PT 17.8<H>  Coags (11-03 @ 15:35)  aPTT -- / INR 2.03<H> / PT 23.6<H>          IMAGING STUDIES

## 2019-11-04 NOTE — PROGRESS NOTE ADULT - PROBLEM SELECTOR PLAN 1
Asa, Statin,  betablocker CAD  Ambulate  H  CVA   PT ordering LE splint   for outpt  Home w/ PT when cleared. Asa, Statin,  betablocker CAD  Ambulate  H  CVA   PT ordering LE splint   for outpt  Home w/ PT

## 2019-11-04 NOTE — PROGRESS NOTE ADULT - ATTENDING COMMENTS
Will increase Lantus to 24u at bedtime, increase Humalog to 10u before each meal, and continue Humalog correction scale coverage.

## 2019-11-04 NOTE — PROGRESS NOTE ADULT - ASSESSMENT
ASSESSMENT  63F s/p CABG x4 (BIMA, RSVG, L radial) and MR, had had IABP placed preop which was removed yesterday, found to have coolness and complaints of numbness in RLE. CTA showed right common femoral artery thrombus, short segment occlusion of the proximal anterior tibial artery and tibioperoneal trunk secondary to embolic disease with prompt reconstitution with three-vessel runoff to the ankle. Recovering well with anticoagulation    PLAN  - Recommend continuing anticoagulation with goal PTT of 70/INR 2  - Cont Antiplatelet and statin therapy  - Continue vascular checks  - Care per CTsx   - Discussed with Dr. Suarez    Vascular Surgery Team  [id number]

## 2019-11-04 NOTE — PROGRESS NOTE ADULT - SUBJECTIVE AND OBJECTIVE BOX
Chief complaint  Patient is a 63y old  Female who presents with a chief complaint of sp    MVR t,    C4B,  JARED (04 Nov 2019 12:15)   Review of systems  Patient in bed, looks comfortable, no fever, no hypoglycemia.    Labs and Fingersticks  CAPILLARY BLOOD GLUCOSE      POCT Blood Glucose.: 321 mg/dL (04 Nov 2019 12:17)  POCT Blood Glucose.: 242 mg/dL (04 Nov 2019 08:22)  POCT Blood Glucose.: 174 mg/dL (03 Nov 2019 21:51)  POCT Blood Glucose.: 168 mg/dL (03 Nov 2019 16:58)      Anion Gap, Serum: 10 (11-04 @ 06:56)  Anion Gap, Serum: 13 (11-03 @ 06:43)      Calcium, Total Serum: 8.6 (11-04 @ 06:56)  Calcium, Total Serum: 8.6 (11-03 @ 06:43)  Albumin, Serum: 2.9 <L> (11-03 @ 06:43)    Alanine Aminotransferase (ALT/SGPT): 87 <H> (11-03 @ 06:43)  Alkaline Phosphatase, Serum: 193 <H> (11-03 @ 06:43)  Aspartate Aminotransferase (AST/SGOT): 45 <H> (11-03 @ 06:43)        11-04    134<L>  |  101  |  28<H>  ----------------------------<  191<H>  4.0   |  23  |  0.85    Ca    8.6      04 Nov 2019 06:56    TPro  6.2  /  Alb  2.9<L>  /  TBili  0.7  /  DBili  x   /  AST  45<H>  /  ALT  87<H>  /  AlkPhos  193<H>  11-03                        10.1   15.26 )-----------( 460      ( 04 Nov 2019 06:56 )             31.1     Medications  MEDICATIONS  (STANDING):  aspirin  chewable 81 milliGRAM(s) Oral daily  atorvastatin 80 milliGRAM(s) Oral at bedtime  chlorhexidine 2% Cloths 1 Application(s) Topical <User Schedule>  dextrose 10% Bolus 125 milliLiter(s) IV Bolus once  dextrose 10% Bolus 250 milliLiter(s) IV Bolus once  dextrose 5%. 1000 milliLiter(s) (50 mL/Hr) IV Continuous <Continuous>  dextrose 50% Injectable 50 milliLiter(s) IV Push every 15 minutes  dextrose 50% Injectable 25 milliLiter(s) IV Push every 15 minutes  furosemide    Tablet 40 milliGRAM(s) Oral daily  heparin  Infusion 1200 Unit(s)/Hr (12 mL/Hr) IV Continuous <Continuous>  insulin glargine Injectable (LANTUS) 24 Unit(s) SubCutaneous at bedtime  insulin lispro (HumaLOG) corrective regimen sliding scale   SubCutaneous three times a day before meals  insulin lispro (HumaLOG) corrective regimen sliding scale   SubCutaneous at bedtime  insulin lispro Injectable (HumaLOG) 10 Unit(s) SubCutaneous three times a day before meals  lisinopril 5 milliGRAM(s) Oral daily  metoprolol tartrate 50 milliGRAM(s) Oral once  pantoprazole    Tablet 40 milliGRAM(s) Oral before breakfast  polyethylene glycol 3350 17 Gram(s) Oral daily  spironolactone 25 milliGRAM(s) Oral daily      Physical Exam  General: Patient comfortable in bed  Vital Signs Last 12 Hrs  T(F): 98.2 (11-04-19 @ 12:40), Max: 98.2 (11-04-19 @ 12:40)  HR: 89 (11-04-19 @ 12:40) (76 - 93)  BP: 122/72 (11-04-19 @ 12:40) (109/65 - 132/78)  BP(mean): --  RR: 18 (11-04-19 @ 12:40) (17 - 18)  SpO2: 99% (11-04-19 @ 12:40) (99% - 100%)  Neck: No palpable thyroid nodules.  CVS: S1S2, No murmurs  Respiratory: No wheezing, no crepitations  GI: Abdomen soft, bowel sounds positive  Musculoskeletal:  edema lower extremities.   Skin: No skin rashes, no ecchymosis    Diagnostics Chief complaint  Patient is a 63y old  Female who presents with a chief complaint of sp    MVR t,    C4B,  JARED (04 Nov 2019 12:15)   Review of systems  Patient in bed, looks comfortable, no fever,  no hypoglycemia.    Labs and Fingersticks  CAPILLARY BLOOD GLUCOSE      POCT Blood Glucose.: 321 mg/dL (04 Nov 2019 12:17)  POCT Blood Glucose.: 242 mg/dL (04 Nov 2019 08:22)  POCT Blood Glucose.: 174 mg/dL (03 Nov 2019 21:51)  POCT Blood Glucose.: 168 mg/dL (03 Nov 2019 16:58)      Anion Gap, Serum: 10 (11-04 @ 06:56)  Anion Gap, Serum: 13 (11-03 @ 06:43)      Calcium, Total Serum: 8.6 (11-04 @ 06:56)  Calcium, Total Serum: 8.6 (11-03 @ 06:43)  Albumin, Serum: 2.9 <L> (11-03 @ 06:43)    Alanine Aminotransferase (ALT/SGPT): 87 <H> (11-03 @ 06:43)  Alkaline Phosphatase, Serum: 193 <H> (11-03 @ 06:43)  Aspartate Aminotransferase (AST/SGOT): 45 <H> (11-03 @ 06:43)        11-04    134<L>  |  101  |  28<H>  ----------------------------<  191<H>  4.0   |  23  |  0.85    Ca    8.6      04 Nov 2019 06:56    TPro  6.2  /  Alb  2.9<L>  /  TBili  0.7  /  DBili  x   /  AST  45<H>  /  ALT  87<H>  /  AlkPhos  193<H>  11-03                        10.1   15.26 )-----------( 460      ( 04 Nov 2019 06:56 )             31.1     Medications  MEDICATIONS  (STANDING):  aspirin  chewable 81 milliGRAM(s) Oral daily  atorvastatin 80 milliGRAM(s) Oral at bedtime  chlorhexidine 2% Cloths 1 Application(s) Topical <User Schedule>  dextrose 10% Bolus 125 milliLiter(s) IV Bolus once  dextrose 10% Bolus 250 milliLiter(s) IV Bolus once  dextrose 5%. 1000 milliLiter(s) (50 mL/Hr) IV Continuous <Continuous>  dextrose 50% Injectable 50 milliLiter(s) IV Push every 15 minutes  dextrose 50% Injectable 25 milliLiter(s) IV Push every 15 minutes  furosemide    Tablet 40 milliGRAM(s) Oral daily  heparin  Infusion 1200 Unit(s)/Hr (12 mL/Hr) IV Continuous <Continuous>  insulin glargine Injectable (LANTUS) 24 Unit(s) SubCutaneous at bedtime  insulin lispro (HumaLOG) corrective regimen sliding scale   SubCutaneous three times a day before meals  insulin lispro (HumaLOG) corrective regimen sliding scale   SubCutaneous at bedtime  insulin lispro Injectable (HumaLOG) 10 Unit(s) SubCutaneous three times a day before meals  lisinopril 5 milliGRAM(s) Oral daily  metoprolol tartrate 50 milliGRAM(s) Oral once  pantoprazole    Tablet 40 milliGRAM(s) Oral before breakfast  polyethylene glycol 3350 17 Gram(s) Oral daily  spironolactone 25 milliGRAM(s) Oral daily      Physical Exam  General: Patient comfortable in bed  Vital Signs Last 12 Hrs  T(F): 98.2 (11-04-19 @ 12:40), Max: 98.2 (11-04-19 @ 12:40)  HR: 89 (11-04-19 @ 12:40) (76 - 93)  BP: 122/72 (11-04-19 @ 12:40) (109/65 - 132/78)  BP(mean): --  RR: 18 (11-04-19 @ 12:40) (17 - 18)  SpO2: 99% (11-04-19 @ 12:40) (99% - 100%)  Neck: No palpable thyroid nodules.  CVS: S1S2, No murmurs  Respiratory: No wheezing, no crepitations  GI: Abdomen soft, bowel sounds positive  Musculoskeletal:  edema lower extremities.   Skin: No skin rashes, no ecchymosis    Diagnostics

## 2019-11-04 NOTE — PROGRESS NOTE ADULT - PROBLEM SELECTOR PLAN 1
Will increase Lantus to 24u at bedtime, increase Humalog to 10u before each meal, and continue Humalog correction scale coverage. Will continue monitoring FS, log, will Follow up.  Patient counseled for compliance with consistent low carb diet, exercise as tolerated as out patient. Will continue monitoring FS, log, will Follow up.  Patient counseled for compliance with consistent low carb diet, exercise as tolerated as out patient.

## 2019-11-04 NOTE — PROGRESS NOTE ADULT - SUBJECTIVE AND OBJECTIVE BOX
VITAL SIGNS    Telemetry:  sr  80-90    Vital Signs Last 24 Hrs  T(C): 36.7 (19 @ 05:10), Max: 36.7 (19 @ 05:10)  T(F): 98 (19 @ 05:10), Max: 98 (19 @ 05:10)  HR: 76 (19 @ 09:05) (76 - 93)  BP: 109/65 (19 @ 09:05) (109/65 - 132/78)  RR: 17 (19 @ 05:10) (17 - 18)  SpO2: 100% (19 @ 05:10) (99% - 100%)                   Daily     Daily Weight in k (2019 07:49)        CAPILLARY BLOOD GLUCOSE      POCT Blood Glucose.: 242 mg/dL (2019 08:22)  POCT Blood Glucose.: 174 mg/dL (2019 21:51)  POCT Blood Glucose.: 168 mg/dL (2019 16:58    Pacing Wires                                    Isolated      Coumadin   YES                           PHYSICAL EXAM    Neurology: alert and oriented x 3, moves all extremities with no defecits  CV :  RRR  Sternal Wound :  CDI , Stable   with pw  Lungs:   CTA B/L  Abdomen: soft, nontender, nondistended, positive bowel sounds, last bowel movement 11/3  Extremities:       bl   feet warm   strong,   trace le edema VITAL SIGNS    Telemetry:  sr  80-90    Vital Signs Last 24 Hrs  T(C): 36.7 (19 @ 05:10), Max: 36.7 (19 @ 05:10)  T(F): 98 (19 @ 05:10), Max: 98 (19 @ 05:10)  HR: 76 (19 @ 09:05) (76 - 93)  BP: 109/65 (19 @ 09:05) (109/65 - 132/78)  RR: 17 (19 @ 05:10) (17 - 18)  SpO2: 100% (19 @ 05:10) (99% - 100%)                   Daily     Daily Weight in k (2019 07:49)        CAPILLARY BLOOD GLUCOSE      POCT Blood Glucose.: 242 mg/dL (2019 08:22)  POCT Blood Glucose.: 174 mg/dL (2019 21:51)  POCT Blood Glucose.: 168 mg/dL (2019 16:58    Pacing Wires                                    Isolated      Coumadin   YES                           PHYSICAL EXAM    Neurology: alert and oriented x 3, moves all extremities with no defecits  CV :  RRR  Sternal Wound :  CDI , Stable   with pw  Lungs:   CTA B/L  Abdomen: soft, nontender, nondistended, positive bowel sounds, last bowel movement 11/3  Extremities:       bl   feet warm   strong,   trace le edema ,  Rt   upper ext  midline for access

## 2019-11-04 NOTE — PROGRESS NOTE ADULT - ASSESSMENT
Assessment  DMT2: 63y Female with DM T2 with hyperglycemia, A1C 7.5%, was on oral meds, S/P CABG on basal bolus insulin, blood sugars elevated and not at target, no hypoglycemic episode, eating meals, noncompliant with low-carb diet, family brings food from home, patient appears comfortable.  CAD: S/P CABG 10/25, on medications, no chest pain, stable, monitored.  HTN: Controlled,  on antihypertensive medications.  HLD: On statin, compliant with  intake.          Mireya Niño MD  Cell: [phone]  Office: [phone] Assessment  DMT2: 63y Female with DM T2 with hyperglycemia, A1C 7.5%, was on oral meds, S/P CABG on basal bolus insulin, blood sugars  elevated and not at target, no hypoglycemic episode, eating meals, noncompliant with low-carb diet, family brings food from home, patient appears comfortable.  CAD: S/P CABG 10/25, on medications, no chest pain, stable, monitored.  HTN: Controlled,  on antihypertensive medications.  HLD: On statin, compliant with  intake.          Mireya Niño MD  Cell: [phone]  Office: [phone]

## 2019-11-04 NOTE — PROGRESS NOTE ADULT - ASSESSMENT
Pt is a 64 yo female with DM, HTN, and CVA (requiring cane to ambulate, but no focal deficits)who presented to Health system with a several hour history of shortness of breath and abdominal pain. Patient states these symptoms started around 4 pm yesterday to this. Prior to this day, patient denies having any similar symptoms prior to yesterday. She had an EKG at [facility] that showed ALAN in lead III and ST depressions in V2-V5. The troponing at the OSH was 1.15, nd propBNP of 4000. She was given lasix, ASA/Brillinta loaded, and started on heparin drip. She was transferred to [facility] for HLOC given concern for NSTEMI.  s/p 10/22 LHC demonstrating severe 3VD and CT Surgery consulted for CABG evaluation.  Endo for glucose control  10/25 Clipping, left atrial appendage  MVR (mitral valve replacement) 31mm tissue valve   CABG x 4  LIMA to OM, DELIA to LAD, SVG to PRICE, Radial artery Y graft to PDA off of SVG   pre-op IABP   Post op pressors, inotropes  Cardiogenic shock  VETO  Ventricular ectopy- amio load  Remains on dobutamine, poor CI/vbg when weaned off.  Acutee blood loss anemia prbc  RUE midline for access  Psych for delerium- a&o- anxiety  R foot ischemia/cold limb s/p CTA which shows small area of low flow - on Heparin gtt, vascular is following, will manage medically; starting Coumadin  Remains on  2mcg  10/31  d/c overnight.   Pt with confusion, non conpliant overnight, improvved this am.  She did not take her Lantus, insulin infusion started this Am for hyperglycemia  Lisinopril added for BP and afterload reduction.  Сергей remains in place  The pt's R foot is slightly cooler than L but the patient has no complaints, examined with Dr Suarez.  Will need to keep the PTT therapeutic.  Echo ordered and completed   f/u echo results.  Ambulation, walker and 1 assist for blalance.  R foot stable, + doppler signal, q2 hr NV checks.  Maintain therapeutic INR, coumadin/heparin, Diuresis   HD stable, remains NSR 80-90s, lopressor uptitrated 25 mg TID, trending LFT's, coumadin 2mg tonight for INR 1.8. Mediastinal DOM removed w/o difficulty.   PLAN: HOME w/ PT when stable. + dopplers RLE, cont vasc checks q4. hep gtt until INR th  11/3 INR  1.9  Dose coumadin, Hep gtt d/c       INR   1.54   repeating    Hep gtt untill PTT resulted,   DC  glucerna   for hyperglycemia    BS  320 this afternoon  will dw  Dr Salinas Lunsford.   ambulating     rt foot warm   + pulse

## 2019-11-05 LAB
ANION GAP SERPL CALC-SCNC: 10 MMOL/L — SIGNIFICANT CHANGE UP (ref 5–17)
APTT BLD: 101.6 SEC — HIGH (ref 27.5–36.3)
APTT BLD: 63.6 SEC — HIGH (ref 27.5–36.3)
APTT BLD: 91.8 SEC — HIGH (ref 27.5–36.3)
APTT BLD: 99.5 SEC — HIGH (ref 27.5–36.3)
BUN SERPL-MCNC: 29 MG/DL — HIGH (ref 7–23)
CALCIUM SERPL-MCNC: 8.8 MG/DL — SIGNIFICANT CHANGE UP (ref 8.4–10.5)
CHLORIDE SERPL-SCNC: 101 MMOL/L — SIGNIFICANT CHANGE UP (ref 96–108)
CO2 SERPL-SCNC: 22 MMOL/L — SIGNIFICANT CHANGE UP (ref 22–31)
CREAT SERPL-MCNC: 0.83 MG/DL — SIGNIFICANT CHANGE UP (ref 0.5–1.3)
GLUCOSE BLDC GLUCOMTR-MCNC: 133 MG/DL — HIGH (ref 70–99)
GLUCOSE BLDC GLUCOMTR-MCNC: 142 MG/DL — HIGH (ref 70–99)
GLUCOSE BLDC GLUCOMTR-MCNC: 160 MG/DL — HIGH (ref 70–99)
GLUCOSE BLDC GLUCOMTR-MCNC: 184 MG/DL — HIGH (ref 70–99)
GLUCOSE SERPL-MCNC: 153 MG/DL — HIGH (ref 70–99)
HCT VFR BLD CALC: 29.4 % — LOW (ref 34.5–45)
HGB BLD-MCNC: 9.4 G/DL — LOW (ref 11.5–15.5)
INR BLD: 1.48 RATIO — HIGH (ref 0.88–1.16)
MCHC RBC-ENTMCNC: 27 PG — SIGNIFICANT CHANGE UP (ref 27–34)
MCHC RBC-ENTMCNC: 32 GM/DL — SIGNIFICANT CHANGE UP (ref 32–36)
MCV RBC AUTO: 84.5 FL — SIGNIFICANT CHANGE UP (ref 80–100)
NRBC # BLD: 0 /100 WBCS — SIGNIFICANT CHANGE UP (ref 0–0)
PLATELET # BLD AUTO: 477 K/UL — HIGH (ref 150–400)
POTASSIUM SERPL-MCNC: 4.3 MMOL/L — SIGNIFICANT CHANGE UP (ref 3.5–5.3)
POTASSIUM SERPL-SCNC: 4.3 MMOL/L — SIGNIFICANT CHANGE UP (ref 3.5–5.3)
PROTHROM AB SERPL-ACNC: 17.2 SEC — HIGH (ref 10–12.9)
RBC # BLD: 3.48 M/UL — LOW (ref 3.8–5.2)
RBC # FLD: 16.6 % — HIGH (ref 10.3–14.5)
SODIUM SERPL-SCNC: 133 MMOL/L — LOW (ref 135–145)
WBC # BLD: 15.2 K/UL — HIGH (ref 3.8–10.5)
WBC # FLD AUTO: 15.2 K/UL — HIGH (ref 3.8–10.5)

## 2019-11-05 PROCEDURE — 93926 LOWER EXTREMITY STUDY: CPT | Mod: 26,RT

## 2019-11-05 RX ORDER — HEPARIN SODIUM 5000 [USP'U]/ML
1200 INJECTION INTRAVENOUS; SUBCUTANEOUS
Qty: 25000 | Refills: 0 | Status: DISCONTINUED | OUTPATIENT
Start: 2019-11-05 | End: 2019-11-06

## 2019-11-05 RX ORDER — WARFARIN SODIUM 2.5 MG/1
7.5 TABLET ORAL ONCE
Refills: 0 | Status: COMPLETED | OUTPATIENT
Start: 2019-11-05 | End: 2019-11-05

## 2019-11-05 RX ADMIN — CHLORHEXIDINE GLUCONATE 1 APPLICATION(S): 213 SOLUTION TOPICAL at 09:00

## 2019-11-05 RX ADMIN — LISINOPRIL 5 MILLIGRAM(S): 2.5 TABLET ORAL at 06:01

## 2019-11-05 RX ADMIN — SPIRONOLACTONE 25 MILLIGRAM(S): 25 TABLET, FILM COATED ORAL at 06:01

## 2019-11-05 RX ADMIN — Medication 81 MILLIGRAM(S): at 13:17

## 2019-11-05 RX ADMIN — HEPARIN SODIUM 12 UNIT(S)/HR: 5000 INJECTION INTRAVENOUS; SUBCUTANEOUS at 14:38

## 2019-11-05 RX ADMIN — PANTOPRAZOLE SODIUM 40 MILLIGRAM(S): 20 TABLET, DELAYED RELEASE ORAL at 06:01

## 2019-11-05 RX ADMIN — Medication 10 UNIT(S): at 13:17

## 2019-11-05 RX ADMIN — INSULIN GLARGINE 24 UNIT(S): 100 INJECTION, SOLUTION SUBCUTANEOUS at 22:17

## 2019-11-05 RX ADMIN — Medication 10 UNIT(S): at 17:08

## 2019-11-05 RX ADMIN — Medication 50 MILLIGRAM(S): at 06:01

## 2019-11-05 RX ADMIN — WARFARIN SODIUM 7.5 MILLIGRAM(S): 2.5 TABLET ORAL at 22:17

## 2019-11-05 RX ADMIN — Medication 1: at 08:20

## 2019-11-05 RX ADMIN — HEPARIN SODIUM 13 UNIT(S)/HR: 5000 INJECTION INTRAVENOUS; SUBCUTANEOUS at 00:17

## 2019-11-05 RX ADMIN — Medication 10 UNIT(S): at 08:20

## 2019-11-05 RX ADMIN — Medication 1: at 13:17

## 2019-11-05 RX ADMIN — Medication 40 MILLIGRAM(S): at 06:01

## 2019-11-05 RX ADMIN — ATORVASTATIN CALCIUM 80 MILLIGRAM(S): 80 TABLET, FILM COATED ORAL at 22:17

## 2019-11-05 NOTE — PROGRESS NOTE ADULT - ASSESSMENT
Pt is a 62 yo female with DM, HTN, and CVA (requiring cane to ambulate, but no focal deficits)who presented to [facility] with a several hour history of shortness of breath and abdominal pain. Patient states these symptoms started around 4 pm yesterday to this. Prior to this day, patient denies having any similar symptoms prior to yesterday. She had an EKG at [facility] that showed ALAN in lead III and ST depressions in V2-V5. The troponing at the OSH was 1.15, nd propBNP of 4000. She was given lasix, ASA/Brillinta loaded, and started on heparin drip. She was transferred to [facility] for HLOC given concern for NSTEMI.  s/p 10/22 LHC demonstrating severe 3VD and CT Surgery consulted for CABG evaluation.  Endo for glucose control  10/25 Clipping, left atrial appendage  MVR (mitral valve replacement) 31mm tissue valve   CABG x 4  LIMA to OM, DELIA to LAD, SVG to PRICE, Radial artery Y graft to PDA off of SVG   pre-op IABP   Post op pressors, inotropes  Cardiogenic shock  VETO  Ventricular ectopy- amio load  Remains on dobutamine, poor CI/vbg when weaned off.  Acutee blood loss anemia prbc  RUE midline for access  Psych for delerium- a&o- anxiety  R foot ischemia/cold limb s/p CTA which shows small area of low flow - on Heparin gtt, vascular is following, will manage medically; starting Coumadin  Remains on  2mcg  10/31  d/c overnight.   Pt with confusion, non conpliant overnight, improvved this am.  She did not take her Lantus, insulin infusion started this Am for hyperglycemia  Lisinopril added for BP and afterload reduction.  Сергей remains in place  The pt's R foot is slightly cooler than L but the patient has no complaints, examined with Dr Suarez.  Will need to keep the PTT therapeutic.  Echo ordered and completed   f/u echo results.  Ambulation, walker and 1 assist for blalance.  R foot stable, + doppler signal, q2 hr NV checks.  Maintain therapeutic INR, coumadin/heparin, Diuresis   HD stable, remains NSR 80-90s, lopressor uptitrated 25 mg TID, trending LFT's, coumadin 2mg tonight for INR 1.8. Mediastinal DOM removed w/o difficulty.   PLAN: HOME w/ PT when stable. + dopplers RLE, cont vasc checks q4. hep gtt until INR th  11/3 INR  1.9  Dose coumadin, Hep gtt d/c       INR   1.54   repeating    Hep gtt untill PTT resulted,   DC  glucerna   for hyperglycemia    BS  320 this afternoon  will dw  Dr Salinas Lunsford.   ambulating     rt foot warm   + pulse  11/5   wbc   stable at 15 afebrile Pt is a 62 yo female with DM, HTN, and CVA (requiring cane to ambulate, but no focal deficits)who presented to [facility] with a several hour history of shortness of breath and abdominal pain. Patient states these symptoms started around 4 pm yesterday to this. Prior to this day, patient denies having any similar symptoms prior to yesterday. She had an EKG at [facility] that showed ALAN in lead III and ST depressions in V2-V5. The troponing at the OSH was 1.15, nd propBNP of 4000. She was given lasix, ASA/Brillinta loaded, and started on heparin drip. She was transferred to [facility] for HLOC given concern for NSTEMI.  s/p 10/22 LHC demonstrating severe 3VD and CT Surgery consulted for CABG evaluation.  Endo for glucose control  10/25 Clipping, left atrial appendage  MVR (mitral valve replacement) 31mm tissue valve   CABG x 4  LIMA to OM, DELIA to LAD, SVG to PRICE, Radial artery Y graft to PDA off of SVG   pre-op IABP   Post op pressors, inotropes  Cardiogenic shock  VETO  Ventricular ectopy- amio load  Remains on dobutamine, poor CI/vbg when weaned off.  Acutee blood loss anemia prbc  RUE midline for access  Psych for delerium- a&o- anxiety  R foot ischemia/cold limb s/p CTA which shows small area of low flow - on Heparin gtt, vascular is following, will manage medically; starting Coumadin  Remains on  2mcg  10/31  d/c overnight.   Pt with confusion, non conpliant overnight, improvved this am.  She did not take her Lantus, insulin infusion started this Am for hyperglycemia  Lisinopril added for BP and afterload reduction.  Сергей remains in place  The pt's R foot is slightly cooler than L but the patient has no complaints, examined with Dr Suarez.  Will need to keep the PTT therapeutic.  Echo ordered and completed   f/u echo results.  Ambulation, walker and 1 assist for blalance.  R foot stable, + doppler signal, q2 hr NV checks.  Maintain therapeutic INR, coumadin/heparin, Diuresis   HD stable, remains NSR 80-90s, lopressor uptitrated 25 mg TID, trending LFT's, coumadin 2mg tonight for INR 1.8. Mediastinal DOM removed w/o difficulty.   PLAN: HOME w/ PT when stable. + dopplers RLE, cont vasc checks q4. hep gtt until INR th  11/3 INR  1.9  Dose coumadin, Hep gtt d/c       INR   1.54   repeating    Hep gtt untill PTT resulted,   DC  glucerna   for hyperglycemia    BS  320 this afternoon  will dw  Dr Salinas Lunsford.   ambulating     rt foot warm   + pulse  11/5   wbc   stable at 15 afebrile,   rt arterial  duplex le done    hep gtt PTT 99   coumadin dosing  7.5

## 2019-11-05 NOTE — PROGRESS NOTE ADULT - ASSESSMENT
Assessment  DMT2: 63y Female with DM T2 with hyperglycemia, A1C 7.5%, was on oral meds, S/P CABG on basal bolus insulin, increased dose yesterday, blood sugars  improving, no hypoglycemic episode, eating full meals, noncompliant with low-carb diet, ambulating, appears improved today.  CAD: S/P CABG 10/25, on medications, no chest pain, stable, monitored.  HTN: Controlled,  on antihypertensive medications.  HLD: On statin, compliant with  intake.          Mireya Niño MD  Cell: [phone]  Office: [phone] Assessment  DMT2: 63y Female with DM T2 with hyperglycemia, A1C 7.5%, was on oral meds, S/P CABG on basal bolus insulin, increased dose yesterday, blood sugars improving, no hypoglycemic episode, eating full meals, noncompliant with low-carb diet, ambulating, appears improved today.  CAD: S/P CABG 10/25, on medications, no chest pain, stable, monitored.  HTN: Controlled,  on antihypertensive medications.  HLD: On statin, compliant with  intake.          Mireya Niño MD  Cell: [phone]  Office: [phone]

## 2019-11-05 NOTE — PROGRESS NOTE ADULT - PROBLEM SELECTOR PLAN 1
Asa, Statin,  betablocker CAD  Ambulate  H  CVA   PT ordering LE splint   for outpt  Home w/ PT Asa, Statin,  betablocker CAD   toprol xl 50  Ambulate  H  CVA   PT ordering LE splint   for outpt  Home w/ PT  whrn INR theurapeutic

## 2019-11-05 NOTE — PROGRESS NOTE ADULT - SUBJECTIVE AND OBJECTIVE BOX
VITAL SIGNS    Telemetry:     sr  90    Vital Signs Last 24 Hrs  T(C): 36.4 (19 @ 05:27), Max: 36.9 (19 @ 20:07)  T(F): 97.5 (19 @ 05:27), Max: 98.4 (19 @ 20:07)  HR: 90 (19 @ 06:36) (84 - 93)  BP: 134/80 (19 @ 06:36) (122/72 - 152/86)  RR: 16 (19 @ 05:27) (16 - 18)  SpO2: 99% (19 @ 05:27) (95% - 99%)                   Daily     Daily Weight in k.6 (2019 07:53)        CAPILLARY BLOOD GLUCOSE      POCT Blood Glucose.: 160 mg/dL (2019 08:11)  POCT Blood Glucose.: 171 mg/dL (2019 21:38)  POCT Blood Glucose.: 190 mg/dL (2019 17:06)  POCT Blood Glucose.: 321 mg/dL (2019 12:17)       Pacing Wires                               Isolated     Coumadin    YES                         PHYSICAL EXAM    Neurology: alert and oriented x 3, moves all extremities with no defecits  CV :  RRR  Sternal Wound :  CDI , Stable  + pw  Lungs:   CTA B/L  Abdomen: soft, nontender, nondistended, positive bowel sounds, last bowel movement   Extremities: VITAL SIGNS    Telemetry:     sr  90    Vital Signs Last 24 Hrs  T(C): 36.4 (19 @ 05:27), Max: 36.9 (19 @ 20:07)  T(F): 97.5 (19 @ 05:27), Max: 98.4 (19 @ 20:07)  HR: 90 (19 @ 06:36) (84 - 93)  BP: 134/80 (19 @ 06:36) (122/72 - 152/86)  RR: 16 (19 @ 05:27) (16 - 18)  SpO2: 99% (19 @ 05:27) (95% - 99%)                   Daily     Daily Weight in k.6 (2019 07:53)        CAPILLARY BLOOD GLUCOSE      POCT Blood Glucose.: 160 mg/dL (2019 08:11)  POCT Blood Glucose.: 171 mg/dL (2019 21:38)  POCT Blood Glucose.: 190 mg/dL (2019 17:06)  POCT Blood Glucose.: 321 mg/dL (2019 12:17)       Pacing Wires                               Isolated     Coumadin    YES                         PHYSICAL EXAM    Neurology: alert and oriented x 3, moves all extremities with no defecits  CV :  RRR  Sternal Wound :  CDI , Stable  + pw  Lungs:   CTA B/L  Abdomen: soft, nontender, nondistended, positive bowel sounds, last bowel movement     Extremities:       bl  warm feet   good pulses,  trace le edema

## 2019-11-05 NOTE — PROGRESS NOTE ADULT - SUBJECTIVE AND OBJECTIVE BOX
Chief complaint  Patient is a 63y old  Female who presents with a chief complaint of sp MVR   C4B, JARED (05 Nov 2019 10:25)   Review of systems  Patient in bed, looks comfortable, no fever, no hypoglycemia.    Labs and Fingersticks  CAPILLARY BLOOD GLUCOSE      POCT Blood Glucose.: 184 mg/dL (05 Nov 2019 12:45)  POCT Blood Glucose.: 160 mg/dL (05 Nov 2019 08:11)  POCT Blood Glucose.: 171 mg/dL (04 Nov 2019 21:38)  POCT Blood Glucose.: 190 mg/dL (04 Nov 2019 17:06)      Anion Gap, Serum: 10 (11-05 @ 06:04)  Anion Gap, Serum: 10 (11-04 @ 06:56)      Calcium, Total Serum: 8.8 (11-05 @ 06:04)  Calcium, Total Serum: 8.6 (11-04 @ 06:56)          11-05    133<L>  |  101  |  29<H>  ----------------------------<  153<H>  4.3   |  22  |  0.83    Ca    8.8      05 Nov 2019 06:04                          9.4    15.20 )-----------( 477      ( 05 Nov 2019 06:05 )             29.4     Medications  MEDICATIONS  (STANDING):  aspirin  chewable 81 milliGRAM(s) Oral daily  atorvastatin 80 milliGRAM(s) Oral at bedtime  chlorhexidine 2% Cloths 1 Application(s) Topical <User Schedule>  dextrose 10% Bolus 125 milliLiter(s) IV Bolus once  dextrose 10% Bolus 250 milliLiter(s) IV Bolus once  dextrose 5%. 1000 milliLiter(s) (50 mL/Hr) IV Continuous <Continuous>  dextrose 50% Injectable 50 milliLiter(s) IV Push every 15 minutes  dextrose 50% Injectable 25 milliLiter(s) IV Push every 15 minutes  furosemide    Tablet 40 milliGRAM(s) Oral daily  heparin  Infusion 1200 Unit(s)/Hr (12 mL/Hr) IV Continuous <Continuous>  insulin glargine Injectable (LANTUS) 24 Unit(s) SubCutaneous at bedtime  insulin lispro (HumaLOG) corrective regimen sliding scale   SubCutaneous three times a day before meals  insulin lispro (HumaLOG) corrective regimen sliding scale   SubCutaneous at bedtime  insulin lispro Injectable (HumaLOG) 10 Unit(s) SubCutaneous three times a day before meals  lisinopril 5 milliGRAM(s) Oral daily  metoprolol succinate ER 50 milliGRAM(s) Oral daily  pantoprazole    Tablet 40 milliGRAM(s) Oral before breakfast  polyethylene glycol 3350 17 Gram(s) Oral daily  spironolactone 25 milliGRAM(s) Oral daily  warfarin 7.5 milliGRAM(s) Oral once      Physical Exam  General: Patient comfortable in bed  Vital Signs Last 12 Hrs  T(F): 98.3 (11-05-19 @ 14:37), Max: 98.3 (11-05-19 @ 14:37)  HR: 95 (11-05-19 @ 14:37) (90 - 95)  BP: 116/75 (11-05-19 @ 14:37) (116/75 - 152/86)  BP(mean): --  RR: 18 (11-05-19 @ 14:37) (16 - 18)  SpO2: 97% (11-05-19 @ 14:37) (97% - 99%)  Neck: No palpable thyroid nodules.  CVS: S1S2, No murmurs  Respiratory: No wheezing, no crepitations  GI: Abdomen soft, bowel sounds positive  Musculoskeletal:  edema lower extremities.   Skin: No skin rashes, no ecchymosis    Diagnostics Chief complaint  Patient is a 63y old  Female who presents with a chief complaint of sp MVR   C4B, JARED (05 Nov 2019 10:25)   Review of systems  Patient in bed, looks comfortable, no fever,  no hypoglycemia.    Labs and Fingersticks  CAPILLARY BLOOD GLUCOSE      POCT Blood Glucose.: 184 mg/dL (05 Nov 2019 12:45)  POCT Blood Glucose.: 160 mg/dL (05 Nov 2019 08:11)  POCT Blood Glucose.: 171 mg/dL (04 Nov 2019 21:38)  POCT Blood Glucose.: 190 mg/dL (04 Nov 2019 17:06)      Anion Gap, Serum: 10 (11-05 @ 06:04)  Anion Gap, Serum: 10 (11-04 @ 06:56)      Calcium, Total Serum: 8.8 (11-05 @ 06:04)  Calcium, Total Serum: 8.6 (11-04 @ 06:56)          11-05    133<L>  |  101  |  29<H>  ----------------------------<  153<H>  4.3   |  22  |  0.83    Ca    8.8      05 Nov 2019 06:04                          9.4    15.20 )-----------( 477      ( 05 Nov 2019 06:05 )             29.4     Medications  MEDICATIONS  (STANDING):  aspirin  chewable 81 milliGRAM(s) Oral daily  atorvastatin 80 milliGRAM(s) Oral at bedtime  chlorhexidine 2% Cloths 1 Application(s) Topical <User Schedule>  dextrose 10% Bolus 125 milliLiter(s) IV Bolus once  dextrose 10% Bolus 250 milliLiter(s) IV Bolus once  dextrose 5%. 1000 milliLiter(s) (50 mL/Hr) IV Continuous <Continuous>  dextrose 50% Injectable 50 milliLiter(s) IV Push every 15 minutes  dextrose 50% Injectable 25 milliLiter(s) IV Push every 15 minutes  furosemide    Tablet 40 milliGRAM(s) Oral daily  heparin  Infusion 1200 Unit(s)/Hr (12 mL/Hr) IV Continuous <Continuous>  insulin glargine Injectable (LANTUS) 24 Unit(s) SubCutaneous at bedtime  insulin lispro (HumaLOG) corrective regimen sliding scale   SubCutaneous three times a day before meals  insulin lispro (HumaLOG) corrective regimen sliding scale   SubCutaneous at bedtime  insulin lispro Injectable (HumaLOG) 10 Unit(s) SubCutaneous three times a day before meals  lisinopril 5 milliGRAM(s) Oral daily  metoprolol succinate ER 50 milliGRAM(s) Oral daily  pantoprazole    Tablet 40 milliGRAM(s) Oral before breakfast  polyethylene glycol 3350 17 Gram(s) Oral daily  spironolactone 25 milliGRAM(s) Oral daily  warfarin 7.5 milliGRAM(s) Oral once      Physical Exam  General: Patient comfortable in bed  Vital Signs Last 12 Hrs  T(F): 98.3 (11-05-19 @ 14:37), Max: 98.3 (11-05-19 @ 14:37)  HR: 95 (11-05-19 @ 14:37) (90 - 95)  BP: 116/75 (11-05-19 @ 14:37) (116/75 - 152/86)  BP(mean): --  RR: 18 (11-05-19 @ 14:37) (16 - 18)  SpO2: 97% (11-05-19 @ 14:37) (97% - 99%)  Neck: No palpable thyroid nodules.  CVS: S1S2, No murmurs  Respiratory: No wheezing, no crepitations  GI: Abdomen soft, bowel sounds positive  Musculoskeletal:  edema lower extremities.   Skin: No skin rashes, no ecchymosis    Diagnostics

## 2019-11-06 LAB
ANION GAP SERPL CALC-SCNC: 11 MMOL/L — SIGNIFICANT CHANGE UP (ref 5–17)
APPEARANCE UR: CLEAR — SIGNIFICANT CHANGE UP
APTT BLD: 133.9 SEC — CRITICAL HIGH (ref 27.5–36.3)
APTT BLD: 50.9 SEC — HIGH (ref 27.5–36.3)
APTT BLD: 63.4 SEC — HIGH (ref 27.5–36.3)
APTT BLD: 76.1 SEC — HIGH (ref 27.5–36.3)
BILIRUB UR-MCNC: NEGATIVE — SIGNIFICANT CHANGE UP
BUN SERPL-MCNC: 27 MG/DL — HIGH (ref 7–23)
CALCIUM SERPL-MCNC: 8.8 MG/DL — SIGNIFICANT CHANGE UP (ref 8.4–10.5)
CHLORIDE SERPL-SCNC: 105 MMOL/L — SIGNIFICANT CHANGE UP (ref 96–108)
CO2 SERPL-SCNC: 23 MMOL/L — SIGNIFICANT CHANGE UP (ref 22–31)
COLOR SPEC: SIGNIFICANT CHANGE UP
CREAT SERPL-MCNC: 0.84 MG/DL — SIGNIFICANT CHANGE UP (ref 0.5–1.3)
DIFF PNL FLD: NEGATIVE — SIGNIFICANT CHANGE UP
GLUCOSE BLDC GLUCOMTR-MCNC: 133 MG/DL — HIGH (ref 70–99)
GLUCOSE BLDC GLUCOMTR-MCNC: 152 MG/DL — HIGH (ref 70–99)
GLUCOSE BLDC GLUCOMTR-MCNC: 165 MG/DL — HIGH (ref 70–99)
GLUCOSE BLDC GLUCOMTR-MCNC: 245 MG/DL — HIGH (ref 70–99)
GLUCOSE SERPL-MCNC: 133 MG/DL — HIGH (ref 70–99)
GLUCOSE UR QL: NEGATIVE — SIGNIFICANT CHANGE UP
HCT VFR BLD CALC: 27.7 % — LOW (ref 34.5–45)
HGB BLD-MCNC: 9 G/DL — LOW (ref 11.5–15.5)
INR BLD: 1.88 RATIO — HIGH (ref 0.88–1.16)
KETONES UR-MCNC: NEGATIVE — SIGNIFICANT CHANGE UP
LEUKOCYTE ESTERASE UR-ACNC: ABNORMAL
MCHC RBC-ENTMCNC: 27.2 PG — SIGNIFICANT CHANGE UP (ref 27–34)
MCHC RBC-ENTMCNC: 32.5 GM/DL — SIGNIFICANT CHANGE UP (ref 32–36)
MCV RBC AUTO: 83.7 FL — SIGNIFICANT CHANGE UP (ref 80–100)
NITRITE UR-MCNC: NEGATIVE — SIGNIFICANT CHANGE UP
NRBC # BLD: 0 /100 WBCS — SIGNIFICANT CHANGE UP (ref 0–0)
PH UR: 6.5 — SIGNIFICANT CHANGE UP (ref 5–8)
PLATELET # BLD AUTO: 502 K/UL — HIGH (ref 150–400)
POTASSIUM SERPL-MCNC: 4.1 MMOL/L — SIGNIFICANT CHANGE UP (ref 3.5–5.3)
POTASSIUM SERPL-SCNC: 4.1 MMOL/L — SIGNIFICANT CHANGE UP (ref 3.5–5.3)
PROT UR-MCNC: NEGATIVE — SIGNIFICANT CHANGE UP
PROTHROM AB SERPL-ACNC: 21.8 SEC — HIGH (ref 10–12.9)
RBC # BLD: 3.31 M/UL — LOW (ref 3.8–5.2)
RBC # FLD: 16.6 % — HIGH (ref 10.3–14.5)
SODIUM SERPL-SCNC: 139 MMOL/L — SIGNIFICANT CHANGE UP (ref 135–145)
SP GR SPEC: 1.01 — SIGNIFICANT CHANGE UP (ref 1.01–1.02)
UROBILINOGEN FLD QL: NEGATIVE — SIGNIFICANT CHANGE UP
WBC # BLD: 15.31 K/UL — HIGH (ref 3.8–10.5)
WBC # FLD AUTO: 15.31 K/UL — HIGH (ref 3.8–10.5)

## 2019-11-06 PROCEDURE — 71046 X-RAY EXAM CHEST 2 VIEWS: CPT | Mod: 26

## 2019-11-06 RX ORDER — CIPROFLOXACIN LACTATE 400MG/40ML
250 VIAL (ML) INTRAVENOUS
Refills: 0 | Status: DISCONTINUED | OUTPATIENT
Start: 2019-11-06 | End: 2019-11-08

## 2019-11-06 RX ORDER — HEPARIN SODIUM 5000 [USP'U]/ML
1300 INJECTION INTRAVENOUS; SUBCUTANEOUS
Qty: 25000 | Refills: 0 | Status: DISCONTINUED | OUTPATIENT
Start: 2019-11-06 | End: 2019-11-07

## 2019-11-06 RX ORDER — METOPROLOL TARTRATE 50 MG
25 TABLET ORAL ONCE
Refills: 0 | Status: COMPLETED | OUTPATIENT
Start: 2019-11-06 | End: 2019-11-06

## 2019-11-06 RX ORDER — INSULIN LISPRO 100/ML
12 VIAL (ML) SUBCUTANEOUS
Refills: 0 | Status: DISCONTINUED | OUTPATIENT
Start: 2019-11-06 | End: 2019-11-08

## 2019-11-06 RX ORDER — WARFARIN SODIUM 2.5 MG/1
5 TABLET ORAL ONCE
Refills: 0 | Status: COMPLETED | OUTPATIENT
Start: 2019-11-06 | End: 2019-11-06

## 2019-11-06 RX ORDER — METOPROLOL TARTRATE 50 MG
75 TABLET ORAL DAILY
Refills: 0 | Status: DISCONTINUED | OUTPATIENT
Start: 2019-11-07 | End: 2019-11-07

## 2019-11-06 RX ORDER — IRON SUCROSE 20 MG/ML
100 INJECTION, SOLUTION INTRAVENOUS
Refills: 0 | Status: COMPLETED | OUTPATIENT
Start: 2019-11-06 | End: 2019-11-06

## 2019-11-06 RX ADMIN — Medication 250 MILLIGRAM(S): at 21:31

## 2019-11-06 RX ADMIN — INSULIN GLARGINE 24 UNIT(S): 100 INJECTION, SOLUTION SUBCUTANEOUS at 21:45

## 2019-11-06 RX ADMIN — Medication 250 MILLIGRAM(S): at 15:15

## 2019-11-06 RX ADMIN — Medication 10 UNIT(S): at 07:51

## 2019-11-06 RX ADMIN — Medication 650 MILLIGRAM(S): at 01:04

## 2019-11-06 RX ADMIN — WARFARIN SODIUM 5 MILLIGRAM(S): 2.5 TABLET ORAL at 21:31

## 2019-11-06 RX ADMIN — Medication 25 MILLIGRAM(S): at 10:41

## 2019-11-06 RX ADMIN — CHLORHEXIDINE GLUCONATE 1 APPLICATION(S): 213 SOLUTION TOPICAL at 10:43

## 2019-11-06 RX ADMIN — Medication 81 MILLIGRAM(S): at 11:55

## 2019-11-06 RX ADMIN — SPIRONOLACTONE 25 MILLIGRAM(S): 25 TABLET, FILM COATED ORAL at 05:40

## 2019-11-06 RX ADMIN — PANTOPRAZOLE SODIUM 40 MILLIGRAM(S): 20 TABLET, DELAYED RELEASE ORAL at 05:40

## 2019-11-06 RX ADMIN — Medication 1: at 17:04

## 2019-11-06 RX ADMIN — Medication 40 MILLIGRAM(S): at 05:40

## 2019-11-06 RX ADMIN — Medication 2: at 11:54

## 2019-11-06 RX ADMIN — Medication 50 MILLIGRAM(S): at 05:40

## 2019-11-06 RX ADMIN — Medication 650 MILLIGRAM(S): at 01:34

## 2019-11-06 RX ADMIN — Medication 12 UNIT(S): at 17:02

## 2019-11-06 RX ADMIN — HEPARIN SODIUM 13 UNIT(S)/HR: 5000 INJECTION INTRAVENOUS; SUBCUTANEOUS at 17:28

## 2019-11-06 RX ADMIN — IRON SUCROSE 210 MILLIGRAM(S): 20 INJECTION, SOLUTION INTRAVENOUS at 11:54

## 2019-11-06 RX ADMIN — Medication 10 UNIT(S): at 11:55

## 2019-11-06 RX ADMIN — HEPARIN SODIUM 12 UNIT(S)/HR: 5000 INJECTION INTRAVENOUS; SUBCUTANEOUS at 10:07

## 2019-11-06 RX ADMIN — LISINOPRIL 5 MILLIGRAM(S): 2.5 TABLET ORAL at 05:41

## 2019-11-06 NOTE — PROGRESS NOTE ADULT - PROBLEM SELECTOR PLAN 2
dose coumadin,  hep gtt for now   dc    wednesday / thursday  if INR stable continue postop care   continue Asa, Statin,  betablocker - increase toprol 75 qd  continue diuresis   continue pulm toilet  pain management  increase activity as tolerated  anticoagulation with heparin and coumdin bridge   Ambulate  H  CVA   PT ordering LE splint   for outpt  Home w/ PT when INR therapeutic thur/fri

## 2019-11-06 NOTE — PROGRESS NOTE ADULT - SUBJECTIVE AND OBJECTIVE BOX
VITAL SIGNS    Telemetry:    Vital Signs Last 24 Hrs  T(C): 36.7 (19 @ 05:20), Max: 36.9 (19 @ 19:37)  T(F): 98 (19 @ 05:20), Max: 98.4 (19 @ 19:37)  HR: 92 (19 @ 05:36) (89 - 99)  BP: 127/79 (19 @ 05:36) (116/75 - 136/79)  RR: 18 (19 @ 05:36) (18 - 18)  SpO2: 95% (19 @ 05:36) (95% - 97%)             @ 07:01  -   @ 07:00  --------------------------------------------------------  IN: 1454 mL / OUT: 3150 mL / NET: -1696 mL     @ 07:01  -   @ 10:09  --------------------------------------------------------  IN: 360 mL / OUT: 800 mL / NET: -440 mL       Daily     Daily Weight in k.9 (2019 07:15)  Admit Wt: Drug Dosing Weight  Height (cm): 162.56 (25 Oct 2019 15:34)  Weight (kg): 57.9 (25 Oct 2019 15:34)  BMI (kg/m2): 21.9 (25 Oct 2019 15:34)  BSA (m2): 1.62 (25 Oct 2019 15:34)      CAPILLARY BLOOD GLUCOSE      POCT Blood Glucose.: 133 mg/dL (2019 07:35)  POCT Blood Glucose.: 142 mg/dL (2019 21:34)  POCT Blood Glucose.: 133 mg/dL (2019 16:54)  POCT Blood Glucose.: 184 mg/dL (2019 12:45)          acetaminophen   Tablet .. 650 milliGRAM(s) Oral every 6 hours PRN  aspirin  chewable 81 milliGRAM(s) Oral daily  atorvastatin 80 milliGRAM(s) Oral at bedtime  chlorhexidine 2% Cloths 1 Application(s) Topical <User Schedule>  dextrose 10% Bolus 125 milliLiter(s) IV Bolus once  dextrose 10% Bolus 250 milliLiter(s) IV Bolus once  dextrose 40% Gel 15 Gram(s) Oral once PRN  dextrose 5%. 1000 milliLiter(s) IV Continuous <Continuous>  dextrose 50% Injectable 50 milliLiter(s) IV Push every 15 minutes  dextrose 50% Injectable 25 milliLiter(s) IV Push every 15 minutes  furosemide    Tablet 40 milliGRAM(s) Oral daily  glucagon  Injectable 1 milliGRAM(s) IntraMuscular once PRN  heparin  Infusion 1200 Unit(s)/Hr IV Continuous <Continuous>  insulin glargine Injectable (LANTUS) 24 Unit(s) SubCutaneous at bedtime  insulin lispro (HumaLOG) corrective regimen sliding scale   SubCutaneous three times a day before meals  insulin lispro (HumaLOG) corrective regimen sliding scale   SubCutaneous at bedtime  insulin lispro Injectable (HumaLOG) 10 Unit(s) SubCutaneous three times a day before meals  iron sucrose IVPB 100 milliGRAM(s) IV Intermittent <User Schedule>  lisinopril 5 milliGRAM(s) Oral daily  metoprolol succinate ER 25 milliGRAM(s) Oral once  pantoprazole    Tablet 40 milliGRAM(s) Oral before breakfast  polyethylene glycol 3350 17 Gram(s) Oral daily  spironolactone 25 milliGRAM(s) Oral daily  warfarin 5 milliGRAM(s) Oral once      PHYSICAL EXAM    Subjective: "Hi.   Neurology: alert and oriented x 3, nonfocal, no gross deficits  CV : tele:  RSR  Sternal Wound :  CDI with dressing , Stable  Lungs: clear. RR easy, unlabored   Abdomen: soft, nontender, nondistended, positive bowel sounds, bowel movement   Neg N/V/D   :  pt voiding without difficulty   Extremities:   WALLS; edema, neg calf tenderness.   PPP bilaterally      PW:  Chest tubes: VITAL SIGNS    Telemetry:  RSR    Vital Signs Last 24 Hrs  T(C): 36.7 (19 @ 05:20), Max: 36.9 (19 @ 19:37)  T(F): 98 (19 @ 05:20), Max: 98.4 (19 @ 19:37)  HR: 92 (19 @ 05:36) (89 - 99)  BP: 127/79 (19 @ 05:36) (116/75 - 136/79)  RR: 18 (19 @ 05:36) (18 - 18)  SpO2: 95% (19 @ 05:36) (95% - 97%)             @ 07:01  -   @ 07:00  --------------------------------------------------------  IN: 1454 mL / OUT: 3150 mL / NET: -1696 mL     @ 07:01  -   @ 10:09  --------------------------------------------------------  IN: 360 mL / OUT: 800 mL / NET: -440 mL       Daily     Daily Weight in k.9 (2019 07:15)  Admit Wt: Drug Dosing Weight  Height (cm): 162.56 (25 Oct 2019 15:34)  Weight (kg): 57.9 (25 Oct 2019 15:34)  BMI (kg/m2): 21.9 (25 Oct 2019 15:34)  BSA (m2): 1.62 (25 Oct 2019 15:34)      CAPILLARY BLOOD GLUCOSE      POCT Blood Glucose.: 133 mg/dL (2019 07:35)  POCT Blood Glucose.: 142 mg/dL (2019 21:34)  POCT Blood Glucose.: 133 mg/dL (2019 16:54)  POCT Blood Glucose.: 184 mg/dL (2019 12:45)          acetaminophen   Tablet .. 650 milliGRAM(s) Oral every 6 hours PRN  aspirin  chewable 81 milliGRAM(s) Oral daily  atorvastatin 80 milliGRAM(s) Oral at bedtime  chlorhexidine 2% Cloths 1 Application(s) Topical <User Schedule>  dextrose 10% Bolus 125 milliLiter(s) IV Bolus once  dextrose 10% Bolus 250 milliLiter(s) IV Bolus once  dextrose 40% Gel 15 Gram(s) Oral once PRN  dextrose 5%. 1000 milliLiter(s) IV Continuous <Continuous>  dextrose 50% Injectable 50 milliLiter(s) IV Push every 15 minutes  dextrose 50% Injectable 25 milliLiter(s) IV Push every 15 minutes  furosemide    Tablet 40 milliGRAM(s) Oral daily  glucagon  Injectable 1 milliGRAM(s) IntraMuscular once PRN  heparin  Infusion 1200 Unit(s)/Hr IV Continuous <Continuous>  insulin glargine Injectable (LANTUS) 24 Unit(s) SubCutaneous at bedtime  insulin lispro (HumaLOG) corrective regimen sliding scale   SubCutaneous three times a day before meals  insulin lispro (HumaLOG) corrective regimen sliding scale   SubCutaneous at bedtime  insulin lispro Injectable (HumaLOG) 10 Unit(s) SubCutaneous three times a day before meals  iron sucrose IVPB 100 milliGRAM(s) IV Intermittent <User Schedule>  lisinopril 5 milliGRAM(s) Oral daily  metoprolol succinate ER 25 milliGRAM(s) Oral once  pantoprazole    Tablet 40 milliGRAM(s) Oral before breakfast  polyethylene glycol 3350 17 Gram(s) Oral daily  spironolactone 25 milliGRAM(s) Oral daily  warfarin 5 milliGRAM(s) Oral once      PHYSICAL EXAM    Subjective: "I feel ok."   Neurology: alert and oriented x 3, nonfocal, no gross deficits  CV : tele:  RSR    Sternal Wound :  CDI NATY - sternum stable; +pw- isolated   Lungs: clear. RR easy, unlabored   Abdomen: soft, nontender, nondistended, positive bowel sounds, + bowel movement   Neg N/V/D   :  pt voiding without difficulty   Extremities:   WALLS; trace LE edema, neg calf tenderness.   PPP bilaterally; rt foot warm to touch; left radial cdi naty - neg errythema/ drainage     + rue midline cdi with dressing     PW: + isolated  Chest tubes: none

## 2019-11-06 NOTE — PROGRESS NOTE ADULT - PROBLEM SELECTOR PLAN 1
Asa, Statin,  betablocker CAD   toprol xl 50  Ambulate  H  CVA   PT ordering LE splint   for outpt  Home w/ PT  whrn INR theurapeutic continue postop care   continue Asa, Statin,  betablocker - increase toprol 75 qd  continue diuresis   continue pulm toilet  pain management  increase activity as tolerated  Ambulate  H  CVA   PT ordering LE splint   for outpt  Home w/ PT when INR therapeutic thur/fri

## 2019-11-06 NOTE — PROGRESS NOTE ADULT - PROBLEM SELECTOR PLAN 1
Will increase Humalog to 12u before each meal.  Will continue Lantus 24u at bedtime as well as Humalog correction scale coverage. Will continue monitoring FS, log, and FU.  Patient counseled for compliance with consistent low carb diet and exercise as tolerated outpatient. Patient counseled for compliance with consistent low carb diet and exercise as tolerated outpatient.

## 2019-11-06 NOTE — PROGRESS NOTE ADULT - ATTENDING COMMENTS
Will increase Humalog to 12u before each meal.  Will continue Lantus 24u at bedtime as well as Humalog correction scale coverage. Will continue monitoring FS, log, and FU.

## 2019-11-06 NOTE — PROVIDER CONTACT NOTE (CRITICAL VALUE NOTIFICATION) - SITUATION
Post-op MVR/CABGx4,JARED, Post-op cardiogenic shock, Delirium, VETO, Right foot ischemic f/u vascular

## 2019-11-06 NOTE — PROGRESS NOTE ADULT - ASSESSMENT
Pt is a 62 yo female with DM, HTN, and CVA (requiring cane to ambulate, but no focal deficits)who presented to [facility] with a several hour history of shortness of breath and abdominal pain. Patient states these symptoms started around 4 pm yesterday to this. Prior to this day, patient denies having any similar symptoms prior to yesterday. She had an EKG at [facility] that showed ALAN in lead III and ST depressions in V2-V5. The troponing at the OSH was 1.15, nd propBNP of 4000. She was given lasix, ASA/Brillinta loaded, and started on heparin drip. She was transferred to [facility] for HLOC given concern for NSTEMI.  s/p 10/22 LHC demonstrating severe 3VD and CT Surgery consulted for CABG evaluation.  Endo for glucose control  10/25 Clipping, left atrial appendage  MVR (mitral valve replacement) 31mm tissue valve   CABG x 4  LIMA to OM, DELIA to LAD, SVG to PRICE, Radial artery Y graft to PDA off of SVG   pre-op IABP   Post op pressors, inotropes  Cardiogenic shock  VETO  Ventricular ectopy- amio load  Remains on dobutamine, poor CI/vbg when weaned off.  Acutee blood loss anemia prbc  RUE midline for access  Psych for delerium- a&o- anxiety  R foot ischemia/cold limb s/p CTA which shows small area of low flow - on Heparin gtt, vascular is following, will manage medically; starting Coumadin  Remains on  2mcg  10/31  d/c overnight.   Pt with confusion, non conpliant overnight, improvved this am.  She did not take her Lantus, insulin infusion started this Am for hyperglycemia  Lisinopril added for BP and afterload reduction.  Сергей remains in place  The pt's R foot is slightly cooler than L but the patient has no complaints, examined with Dr Suarez.  Will need to keep the PTT therapeutic.  Echo ordered and completed   f/u echo results.  Ambulation, walker and 1 assist for blalance.  R foot stable, + doppler signal, q2 hr NV checks.  Maintain therapeutic INR, coumadin/heparin, Diuresis   HD stable, remains NSR 80-90s, lopressor uptitrated 25 mg TID, trending LFT's, coumadin 2mg tonight for INR 1.8. Mediastinal DOM removed w/o difficulty.   PLAN: HOME w/ PT when stable. + dopplers RLE, cont vasc checks q4. hep gtt until INR th  11/3 INR  1.9  Dose coumadin, Hep gtt d/c       INR   1.54   repeating    Hep gtt untill PTT resulted,   DC  glucerna   for hyperglycemia    BS  320 this afternoon  will dw  Dr Salinas Lunsford.   ambulating     rt foot warm   + pulse  11/5   wbc   stable at 15 afebrile,   rt arterial  duplex le done    hep gtt PTT 99   coumadin dosing  7.5 Pt is a 64 yo female with DM, HTN, and CVA (requiring cane to ambulate, but no focal deficits)who presented to [facility] with a several hour history of shortness of breath and abdominal pain. Patient states these symptoms started around 4 pm yesterday to this. Prior to this day, patient denies having any similar symptoms prior to yesterday. She had an EKG at [facility] that showed ALAN in lead III and ST depressions in V2-V5. The troponing at the OSH was 1.15, nd propBNP of 4000. She was given lasix, ASA/Brillinta loaded, and started on heparin drip. She was transferred to [facility] for HLOC given concern for NSTEMI.  s/p 10/22 LHC demonstrating severe 3VD and CT Surgery consulted for CABG evaluation.  Endo for glucose control  10/25 Clipping, left atrial appendage  MVR (mitral valve replacement) 31mm tissue valve   CABG x 4  LIMA to OM, DELIA to LAD, SVG to PRICE, Radial artery Y graft to PDA off of SVG   pre-op IABP   Post op pressors, inotropes  Cardiogenic shock  VETO  Ventricular ectopy- amio load  Remains on dobutamine, poor CI/vbg when weaned off.  Acutee blood loss anemia prbc  RUE midline for access  Psych for delerium- a&o- anxiety  R foot ischemia/cold limb s/p CTA which shows small area of low flow - on Heparin gtt, vascular is following, will manage medically; starting Coumadin  Remains on  2mcg  10/31  d/c overnight.   Pt with confusion, non conpliant overnight, improvved this am.  She did not take her Lantus, insulin infusion started this Am for hyperglycemia  Lisinopril added for BP and afterload reduction.  Сергей remains in place  The pt's R foot is slightly cooler than L but the patient has no complaints, examined with Dr Suarez.  Will need to keep the PTT therapeutic.  Echo ordered and completed   f/u echo results.  Ambulation, walker and 1 assist for blalance.  R foot stable, + doppler signal, q2 hr NV checks.  Maintain therapeutic INR, coumadin/heparin, Diuresis   HD stable, remains NSR 80-90s, lopressor uptitrated 25 mg TID, trending LFT's, coumadin 2mg tonight for INR 1.8. Mediastinal DOM removed w/o difficulty.   PLAN: HOME w/ PT when stable. + dopplers RLE, cont vasc checks q4. hep gtt until INR th  11/3 INR  1.9  Dose coumadin, Hep gtt d/c       INR   1.54   repeating    Hep gtt untill PTT resulted,   DC  glucerna   for hyperglycemia    BS  320 this afternoon  will dw  Dr Salinas Lunsford.   ambulating     rt foot warm   + pulse     wbc   stable at 15 afebrile,   rt arterial  duplex le done    hep gtt PTT 99   coumadin dosing  7.5   VSS; increase toprol 75 qd for HR control as per DR. Box; diuresis; wbc 15- afebrile; no s/s of infection noted; ck ua/ chest xray  shower daily; pw cut today   anticoagulation with coumadin INR 1.8-  coumadin 5 mg  Discharge planning- home pt likely thur if INR/ WBC stable

## 2019-11-07 DIAGNOSIS — E11.9 TYPE 2 DIABETES MELLITUS WITHOUT COMPLICATIONS: ICD-10-CM

## 2019-11-07 LAB
ANION GAP SERPL CALC-SCNC: 13 MMOL/L — SIGNIFICANT CHANGE UP (ref 5–17)
APTT BLD: 199.5 SEC — CRITICAL HIGH (ref 27.5–36.3)
APTT BLD: >200 SEC — CRITICAL HIGH (ref 27.5–36.3)
APTT BLD: >200 SEC — CRITICAL HIGH (ref 27.5–36.3)
BUN SERPL-MCNC: 29 MG/DL — HIGH (ref 7–23)
CALCIUM SERPL-MCNC: 8.7 MG/DL — SIGNIFICANT CHANGE UP (ref 8.4–10.5)
CHLORIDE SERPL-SCNC: 102 MMOL/L — SIGNIFICANT CHANGE UP (ref 96–108)
CO2 SERPL-SCNC: 21 MMOL/L — LOW (ref 22–31)
CREAT SERPL-MCNC: 0.92 MG/DL — SIGNIFICANT CHANGE UP (ref 0.5–1.3)
GLUCOSE BLDC GLUCOMTR-MCNC: 102 MG/DL — HIGH (ref 70–99)
GLUCOSE BLDC GLUCOMTR-MCNC: 126 MG/DL — HIGH (ref 70–99)
GLUCOSE BLDC GLUCOMTR-MCNC: 161 MG/DL — HIGH (ref 70–99)
GLUCOSE BLDC GLUCOMTR-MCNC: 88 MG/DL — SIGNIFICANT CHANGE UP (ref 70–99)
GLUCOSE SERPL-MCNC: 104 MG/DL — HIGH (ref 70–99)
HCT VFR BLD CALC: 29.8 % — LOW (ref 34.5–45)
HGB BLD-MCNC: 8.9 G/DL — LOW (ref 11.5–15.5)
INR BLD: 2.67 RATIO — HIGH (ref 0.88–1.16)
MCHC RBC-ENTMCNC: 26.9 PG — LOW (ref 27–34)
MCHC RBC-ENTMCNC: 29.9 GM/DL — LOW (ref 32–36)
MCV RBC AUTO: 90 FL — SIGNIFICANT CHANGE UP (ref 80–100)
NRBC # BLD: 0 /100 WBCS — SIGNIFICANT CHANGE UP (ref 0–0)
PLATELET # BLD AUTO: 443 K/UL — HIGH (ref 150–400)
POTASSIUM SERPL-MCNC: 4.4 MMOL/L — SIGNIFICANT CHANGE UP (ref 3.5–5.3)
POTASSIUM SERPL-SCNC: 4.4 MMOL/L — SIGNIFICANT CHANGE UP (ref 3.5–5.3)
PROTHROM AB SERPL-ACNC: 31.4 SEC — HIGH (ref 10–12.9)
RBC # BLD: 3.31 M/UL — LOW (ref 3.8–5.2)
RBC # FLD: 16.8 % — HIGH (ref 10.3–14.5)
SODIUM SERPL-SCNC: 136 MMOL/L — SIGNIFICANT CHANGE UP (ref 135–145)
WBC # BLD: 11.68 K/UL — HIGH (ref 3.8–10.5)
WBC # FLD AUTO: 11.68 K/UL — HIGH (ref 3.8–10.5)

## 2019-11-07 RX ORDER — METOPROLOL TARTRATE 50 MG
100 TABLET ORAL DAILY
Refills: 0 | Status: DISCONTINUED | OUTPATIENT
Start: 2019-11-08 | End: 2019-11-08

## 2019-11-07 RX ORDER — WARFARIN SODIUM 2.5 MG/1
2 TABLET ORAL ONCE
Refills: 0 | Status: DISCONTINUED | OUTPATIENT
Start: 2019-11-07 | End: 2019-11-07

## 2019-11-07 RX ORDER — WARFARIN SODIUM 2.5 MG/1
1 TABLET ORAL ONCE
Refills: 0 | Status: COMPLETED | OUTPATIENT
Start: 2019-11-07 | End: 2019-11-07

## 2019-11-07 RX ORDER — ENOXAPARIN SODIUM 100 MG/ML
40 INJECTION SUBCUTANEOUS DAILY
Refills: 0 | Status: DISCONTINUED | OUTPATIENT
Start: 2019-11-08 | End: 2019-11-08

## 2019-11-07 RX ORDER — METOPROLOL TARTRATE 50 MG
25 TABLET ORAL ONCE
Refills: 0 | Status: COMPLETED | OUTPATIENT
Start: 2019-11-07 | End: 2019-11-07

## 2019-11-07 RX ADMIN — Medication 650 MILLIGRAM(S): at 23:02

## 2019-11-07 RX ADMIN — WARFARIN SODIUM 1 MILLIGRAM(S): 2.5 TABLET ORAL at 22:32

## 2019-11-07 RX ADMIN — PANTOPRAZOLE SODIUM 40 MILLIGRAM(S): 20 TABLET, DELAYED RELEASE ORAL at 06:04

## 2019-11-07 RX ADMIN — POLYETHYLENE GLYCOL 3350 17 GRAM(S): 17 POWDER, FOR SOLUTION ORAL at 12:00

## 2019-11-07 RX ADMIN — Medication 12 UNIT(S): at 12:00

## 2019-11-07 RX ADMIN — SPIRONOLACTONE 25 MILLIGRAM(S): 25 TABLET, FILM COATED ORAL at 06:06

## 2019-11-07 RX ADMIN — Medication 40 MILLIGRAM(S): at 06:05

## 2019-11-07 RX ADMIN — Medication 25 MILLIGRAM(S): at 08:54

## 2019-11-07 RX ADMIN — CHLORHEXIDINE GLUCONATE 1 APPLICATION(S): 213 SOLUTION TOPICAL at 08:56

## 2019-11-07 RX ADMIN — Medication 250 MILLIGRAM(S): at 17:31

## 2019-11-07 RX ADMIN — Medication 250 MILLIGRAM(S): at 06:04

## 2019-11-07 RX ADMIN — Medication 75 MILLIGRAM(S): at 06:04

## 2019-11-07 RX ADMIN — Medication 81 MILLIGRAM(S): at 12:00

## 2019-11-07 RX ADMIN — Medication 12 UNIT(S): at 17:31

## 2019-11-07 RX ADMIN — LISINOPRIL 5 MILLIGRAM(S): 2.5 TABLET ORAL at 06:04

## 2019-11-07 RX ADMIN — ATORVASTATIN CALCIUM 80 MILLIGRAM(S): 80 TABLET, FILM COATED ORAL at 22:32

## 2019-11-07 RX ADMIN — Medication 12 UNIT(S): at 08:54

## 2019-11-07 RX ADMIN — INSULIN GLARGINE 24 UNIT(S): 100 INJECTION, SOLUTION SUBCUTANEOUS at 22:31

## 2019-11-07 RX ADMIN — Medication 650 MILLIGRAM(S): at 22:32

## 2019-11-07 RX ADMIN — HEPARIN SODIUM 12 UNIT(S)/HR: 5000 INJECTION INTRAVENOUS; SUBCUTANEOUS at 02:49

## 2019-11-07 NOTE — PROGRESS NOTE ADULT - ASSESSMENT
Pt is a 64 yo female with DM, HTN, and CVA (requiring cane to ambulate, but no focal deficits)who presented to [facility] with a several hour history of shortness of breath and abdominal pain. Patient states these symptoms started around 4 pm yesterday to this. Prior to this day, patient denies having any similar symptoms prior to yesterday. She had an EKG at [facility] that showed ALAN in lead III and ST depressions in V2-V5. The troponing at the OSH was 1.15, nd propBNP of 4000. She was given lasix, ASA/Brillinta loaded, and started on heparin drip. She was transferred to [facility] for HLOC given concern for NSTEMI.  s/p 10/22 LHC demonstrating severe 3VD and CT Surgery consulted for CABG evaluation.  Endo for glucose control  10/25 Clipping, left atrial appendage  MVR (mitral valve replacement) 31mm tissue valve   CABG x 4  LIMA to OM, DELIA to LAD, SVG to PRICE, Radial artery Y graft to PDA off of SVG   pre-op IABP   Post op pressors, inotropes  Cardiogenic shock  VETO  Ventricular ectopy- amio load  Remains on dobutamine, poor CI/vbg when weaned off.  Acutee blood loss anemia prbc  RUE midline for access  Psych for delerium- a&o- anxiety  R foot ischemia/cold limb s/p CTA which shows small area of low flow - on Heparin gtt, vascular is following, will manage medically; starting Coumadin  Remains on  2mcg  10/31  d/c overnight.   Pt with confusion, non conpliant overnight, improvved this am.  She did not take her Lantus, insulin infusion started this Am for hyperglycemia  Lisinopril added for BP and afterload reduction.  Сергей remains in place  The pt's R foot is slightly cooler than L but the patient has no complaints, examined with Dr Suarez.  Will need to keep the PTT therapeutic.  Echo ordered and completed   f/u echo results.  Ambulation, walker and 1 assist for blalance.  R foot stable, + doppler signal, q2 hr NV checks.  Maintain therapeutic INR, coumadin/heparin, Diuresis   HD stable, remains NSR 80-90s, lopressor uptitrated 25 mg TID, trending LFT's, coumadin 2mg tonight for INR 1.8. Mediastinal DOM removed w/o difficulty.   PLAN: HOME w/ PT when stable. + dopplers RLE, cont vasc checks q4. hep gtt until INR th  11/3 INR  1.9  Dose coumadin, Hep gtt d/c       INR   1.54   repeating    Hep gtt untill PTT resulted,   DC  glucerna   for hyperglycemia    BS  320 this afternoon  will dw  Dr Salinas Lunsford.   ambulating     rt foot warm   + pulse     wbc   stable at 15 afebrile,   rt arterial  duplex le done    hep gtt PTT 99   coumadin dosing  7.5   VSS; increase toprol 75 qd for HR control as per DR. Box; diuresis; wbc 15- afebrile; no s/s of infection noted; ck ua/ chest xray  shower daily; pw cut today   anticoagulation with coumadin INR 1.8-  coumadin 5 mg  Discharge planning- home pt likely thur if INR/ WBC stable Pt is a 62 yo female with DM, HTN, and CVA (requiring cane to ambulate, but no focal deficits)who presented to [facility] with a several hour history of shortness of breath and abdominal pain. Patient states these symptoms started around 4 pm yesterday to this. Prior to this day, patient denies having any similar symptoms prior to yesterday. She had an EKG at [facility] that showed ALAN in lead III and ST depressions in V2-V5. The troponing at the OSH was 1.15, nd propBNP of 4000. She was given lasix, ASA/Brillinta loaded, and started on heparin drip. She was transferred to [facility] for HLOC given concern for NSTEMI.  s/p 10/22 LHC demonstrating severe 3VD and CT Surgery consulted for CABG evaluation.  Endo for glucose control  10/25 Clipping, left atrial appendage  MVR (mitral valve replacement) 31mm tissue valve   CABG x 4  LIMA to OM, DELIA to LAD, SVG to PRICE, Radial artery Y graft to PDA off of SVG   pre-op IABP   Post op pressors, inotropes  Cardiogenic shock  VETO  Ventricular ectopy- amio load  Remains on dobutamine, poor CI/vbg when weaned off.  Acutee blood loss anemia prbc  RUE midline for access  Psych for delerium- a&o- anxiety  R foot ischemia/cold limb s/p CTA which shows small area of low flow - on Heparin gtt, vascular is following, will manage medically; starting Coumadin  Remains on  2mcg  10/31  d/c overnight.   Pt with confusion, non conpliant overnight, improvved this am.  She did not take her Lantus, insulin infusion started this Am for hyperglycemia  Lisinopril added for BP and afterload reduction.  Сергей remains in place  The pt's R foot is slightly cooler than L but the patient has no complaints, examined with Dr Suarez.  Will need to keep the PTT therapeutic.  Echo ordered and completed   f/u echo results.  Ambulation, walker and 1 assist for blalance.  R foot stable, + doppler signal, q2 hr NV checks.  Maintain therapeutic INR, coumadin/heparin, Diuresis   HD stable, remains NSR 80-90s, lopressor uptitrated 25 mg TID, trending LFT's, coumadin 2mg tonight for INR 1.8. Mediastinal DOM removed w/o difficulty.   PLAN: HOME w/ PT when stable. + dopplers RLE, cont vasc checks q4. hep gtt until INR th  11/3 INR  1.9  Dose coumadin, Hep gtt d/c       INR   1.54   repeating    Hep gtt untill PTT resulted,   DC  glucerna   for hyperglycemia    BS  320 this afternoon  will dw  Dr Salinas Lunsford.   ambulating     rt foot warm   + pulse     wbc   stable at 15 afebrile,   rt arterial  duplex le done    hep gtt PTT 99   coumadin dosing  7.5   VSS; increase toprol 75 qd for HR control as per DR. Box; diuresis; wbc 15- afebrile; no s/s of infection noted; ck ua/ chest xray, shower daily; pw cut today, anticoagulation with coumadin INR 1.8-  coumadin 5 mg   VSS, INR 2.67, Coumadin 2mg. Heparin drip dc'd. WBC trending down 11.6, afebrile. Plan for discharge home in AM, pt and family notified.  Discharge planning- home pt

## 2019-11-07 NOTE — PROVIDER CONTACT NOTE (CRITICAL VALUE NOTIFICATION) - ASSESSMENT
Pt assessed no bleeding, Pt reports no pain or discomfort
Patient awake alert and oriented x2. Has bouts of confusion . Skin warm and dry to touch. Respiration regular and easy.  No S/S of bleeding noted.
Pt A&OX4, VSS, no s/s bleeding, NSR on tele, no c/o CP or SOB, heparin gtt @ 12 ml/hr

## 2019-11-07 NOTE — PROVIDER CONTACT NOTE (CRITICAL VALUE NOTIFICATION) - ACTION/TREATMENT ORDERED:
Hold, Repeat PTT
INR 2.67, Heparin gtt D/C'd, no additional orders or interventions at this time, will continue to monitor pt
Heparin has been held   and  decreased to 12 ml/hr. Ptt to be repeated in 6 hours.  Will continue to monitor PTT  and  s/s of bleeding.

## 2019-11-07 NOTE — PROGRESS NOTE ADULT - ASSESSMENT
Assessment  DMT2: 63y Female with DM T2 with hyperglycemia, A1C 7.5%, was on oral meds at home, S/P CABG on basal bolus insulin, increased dose yesterday, blood sugars improving, trending within acceptable range, no hypoglycemic episode, eating full meals, noncompliant with low-carb diet, appears comfortable.  CAD: S/P CABG 10/25, on medications, no chest pain, stable, monitored.  HTN: Controlled,  on antihypertensive medications.  HLD: On statin, compliant with  intake.          Mireya Niño MD  Cell: [phone]  Office: [phone] Assessment  DMT2: 63y Female with DM T2 with hyperglycemia, A1C 7.5%, was on oral meds at home, S/P CABG on basal bolus insulin, increased dose yesterday,  blood sugars improving, trending within acceptable range, no hypoglycemic episode, eating full meals, noncompliant with low-carb diet, appears comfortable.  CAD: S/P CABG 10/25, on medications, no chest pain, stable, monitored.  HTN: Controlled,  on antihypertensive medications.  HLD: On statin, compliant with  intake.          Mireya Niño MD  Cell: [phone]  Office: [phone]

## 2019-11-07 NOTE — PROGRESS NOTE ADULT - SUBJECTIVE AND OBJECTIVE BOX
Subjective: Pt states "Hello" denies any CP or SOB. No acute events overnight.     Telemetry:  SR 80 - 100  Vital Signs Last 24 Hrs  T(C): 37.5 (19 @ 12:00), Max: 37.5 (19 @ 12:00)  T(F): 99.5 (19 @ 12:00), Max: 99.5 (19 @ 12:00)  HR: 88 (19 @ 11:39) (88 - 100)  BP: 120/77 (19 @ 11:39) (115/68 - 140/80)  RR: 18 (19 @ 12:00) (18 - 18)  SpO2: 96% (19 @ 12:00) (96% - 96%)              @ 07:01  -   @ 07:00  --------------------------------------------------------  IN: 1660 mL / OUT: 3400 mL / NET: -1740 mL        Daily Weight in k.7 (2019 07:19)                        8.9    11.68 )-----------( 443      ( 2019 01:33 )             29.8     136  |  102  |  29<H>  ----------------------------<  104<H>  4.4   |  21<L>  |  0.92          CAPILLARY BLOOD GLUCOSE  88 - 126            PHYSICAL EXAM  Neurology: A&Ox3, nonfocal, no gross deficits  CV : RRR+S1S2  Sternal Wound: MSI CDI CARMELINA, Stable  Lungs: Respirations non-labored, B/L BS CTA  Abdomen: Soft, NT/ND, +BSx4Q, last BM  (-)N/V/D  : Voiding without difficulty  Extremities: B/L LE no edema, negative calf tenderness, +PP, RLE SVG incision CDI CARMELINA            MEDICATIONS  acetaminophen   Tablet .. 650 milliGRAM(s) Oral every 6 hours PRN  aspirin  chewable 81 milliGRAM(s) Oral daily  atorvastatin 80 milliGRAM(s) Oral at bedtime  chlorhexidine 2% Cloths 1 Application(s) Topical <User Schedule>  ciprofloxacin     Tablet 250 milliGRAM(s) Oral two times a day  furosemide    Tablet 40 milliGRAM(s) Oral daily  glucagon  Injectable 1 milliGRAM(s) IntraMuscular once PRN  insulin glargine Injectable (LANTUS) 24 Unit(s) SubCutaneous at bedtime  insulin lispro (HumaLOG) corrective regimen sliding scale   SubCutaneous three times a day before meals  insulin lispro (HumaLOG) corrective regimen sliding scale   SubCutaneous at bedtime  insulin lispro Injectable (HumaLOG) 12 Unit(s) SubCutaneous three times a day before meals  lisinopril 5 milliGRAM(s) Oral daily  pantoprazole    Tablet 40 milliGRAM(s) Oral before breakfast  polyethylene glycol 3350 17 Gram(s) Oral daily  spironolactone 25 milliGRAM(s) Oral daily  warfarin 2 milliGRAM(s) Oral once      Physical Therapy Rec:   Home  [  ]   Home w/ PT  [ X ]  Rehab  [  ]    Discussed with Cardiothoracic Team at AM rounds.

## 2019-11-07 NOTE — PROGRESS NOTE ADULT - PROBLEM SELECTOR PLAN 3
R  CFA> continue coumadin,  heparin gtt  for now   f/up rt arterial doppler done this am Endocrine following  Continue Lantus 24 HS and Humalog 12 TID premeals  Start insulin teaching - pt to be discharged on insulin  Check AC/HS FS and continue diabetic diet

## 2019-11-07 NOTE — PROGRESS NOTE ADULT - SUBJECTIVE AND OBJECTIVE BOX
Chief complaint  Patient is a 63y old  Female who presents with a chief complaint of NSTEMI (06 Nov 2019 13:33)   Review of systems  Patient in bed, looks comfortable, no fever, no hypoglycemia.    Labs and Fingersticks  CAPILLARY BLOOD GLUCOSE      POCT Blood Glucose.: 88 mg/dL (07 Nov 2019 08:03)  POCT Blood Glucose.: 152 mg/dL (06 Nov 2019 21:35)  POCT Blood Glucose.: 165 mg/dL (06 Nov 2019 16:44)  POCT Blood Glucose.: 245 mg/dL (06 Nov 2019 11:42)      Anion Gap, Serum: 13 (11-07 @ 01:33)  Anion Gap, Serum: 11 (11-06 @ 03:35)      Calcium, Total Serum: 8.7 (11-07 @ 01:33)  Calcium, Total Serum: 8.8 (11-06 @ 03:35)          11-07    136  |  102  |  29<H>  ----------------------------<  104<H>  4.4   |  21<L>  |  0.92    Ca    8.7      07 Nov 2019 01:33                          8.9    11.68 )-----------( 443      ( 07 Nov 2019 01:33 )             29.8     Medications  MEDICATIONS  (STANDING):  aspirin  chewable 81 milliGRAM(s) Oral daily  atorvastatin 80 milliGRAM(s) Oral at bedtime  chlorhexidine 2% Cloths 1 Application(s) Topical <User Schedule>  ciprofloxacin     Tablet 250 milliGRAM(s) Oral two times a day  dextrose 10% Bolus 125 milliLiter(s) IV Bolus once  dextrose 10% Bolus 250 milliLiter(s) IV Bolus once  dextrose 5%. 1000 milliLiter(s) (50 mL/Hr) IV Continuous <Continuous>  dextrose 50% Injectable 50 milliLiter(s) IV Push every 15 minutes  dextrose 50% Injectable 25 milliLiter(s) IV Push every 15 minutes  furosemide    Tablet 40 milliGRAM(s) Oral daily  insulin glargine Injectable (LANTUS) 24 Unit(s) SubCutaneous at bedtime  insulin lispro (HumaLOG) corrective regimen sliding scale   SubCutaneous three times a day before meals  insulin lispro (HumaLOG) corrective regimen sliding scale   SubCutaneous at bedtime  insulin lispro Injectable (HumaLOG) 12 Unit(s) SubCutaneous three times a day before meals  lisinopril 5 milliGRAM(s) Oral daily  pantoprazole    Tablet 40 milliGRAM(s) Oral before breakfast  polyethylene glycol 3350 17 Gram(s) Oral daily  spironolactone 25 milliGRAM(s) Oral daily  warfarin 2 milliGRAM(s) Oral once      Physical Exam  General: Patient comfortable in bed  Vital Signs Last 12 Hrs  T(F): 98.7 (11-07-19 @ 06:10), Max: 98.7 (11-07-19 @ 06:10)  HR: 100 (11-07-19 @ 06:10) (100 - 100)  BP: 140/80 (11-07-19 @ 06:10) (140/80 - 140/80)  BP(mean): --  RR: 18 (11-07-19 @ 06:10) (18 - 18)  SpO2: 96% (11-07-19 @ 06:10) (96% - 96%)  Neck: No palpable thyroid nodules.  CVS: S1S2, No murmurs  Respiratory: No wheezing, no crepitations  GI: Abdomen soft, bowel sounds positive  Musculoskeletal:  edema lower extremities.   Skin: No skin rashes, no ecchymosis    Diagnostics Chief complaint  Patient is a 63y old  Female who presents with a chief complaint of NSTEMI (06 Nov 2019 13:33)   Review of systems  Patient in bed, looks comfortable, no fever,  no hypoglycemia.    Labs and Fingersticks  CAPILLARY BLOOD GLUCOSE      POCT Blood Glucose.: 88 mg/dL (07 Nov 2019 08:03)  POCT Blood Glucose.: 152 mg/dL (06 Nov 2019 21:35)  POCT Blood Glucose.: 165 mg/dL (06 Nov 2019 16:44)  POCT Blood Glucose.: 245 mg/dL (06 Nov 2019 11:42)      Anion Gap, Serum: 13 (11-07 @ 01:33)  Anion Gap, Serum: 11 (11-06 @ 03:35)      Calcium, Total Serum: 8.7 (11-07 @ 01:33)  Calcium, Total Serum: 8.8 (11-06 @ 03:35)          11-07    136  |  102  |  29<H>  ----------------------------<  104<H>  4.4   |  21<L>  |  0.92    Ca    8.7      07 Nov 2019 01:33                          8.9    11.68 )-----------( 443      ( 07 Nov 2019 01:33 )             29.8     Medications  MEDICATIONS  (STANDING):  aspirin  chewable 81 milliGRAM(s) Oral daily  atorvastatin 80 milliGRAM(s) Oral at bedtime  chlorhexidine 2% Cloths 1 Application(s) Topical <User Schedule>  ciprofloxacin     Tablet 250 milliGRAM(s) Oral two times a day  dextrose 10% Bolus 125 milliLiter(s) IV Bolus once  dextrose 10% Bolus 250 milliLiter(s) IV Bolus once  dextrose 5%. 1000 milliLiter(s) (50 mL/Hr) IV Continuous <Continuous>  dextrose 50% Injectable 50 milliLiter(s) IV Push every 15 minutes  dextrose 50% Injectable 25 milliLiter(s) IV Push every 15 minutes  furosemide    Tablet 40 milliGRAM(s) Oral daily  insulin glargine Injectable (LANTUS) 24 Unit(s) SubCutaneous at bedtime  insulin lispro (HumaLOG) corrective regimen sliding scale   SubCutaneous three times a day before meals  insulin lispro (HumaLOG) corrective regimen sliding scale   SubCutaneous at bedtime  insulin lispro Injectable (HumaLOG) 12 Unit(s) SubCutaneous three times a day before meals  lisinopril 5 milliGRAM(s) Oral daily  pantoprazole    Tablet 40 milliGRAM(s) Oral before breakfast  polyethylene glycol 3350 17 Gram(s) Oral daily  spironolactone 25 milliGRAM(s) Oral daily  warfarin 2 milliGRAM(s) Oral once      Physical Exam  General: Patient comfortable in bed  Vital Signs Last 12 Hrs  T(F): 98.7 (11-07-19 @ 06:10), Max: 98.7 (11-07-19 @ 06:10)  HR: 100 (11-07-19 @ 06:10) (100 - 100)  BP: 140/80 (11-07-19 @ 06:10) (140/80 - 140/80)  BP(mean): --  RR: 18 (11-07-19 @ 06:10) (18 - 18)  SpO2: 96% (11-07-19 @ 06:10) (96% - 96%)  Neck: No palpable thyroid nodules.  CVS: S1S2, No murmurs  Respiratory: No wheezing, no crepitations  GI: Abdomen soft, bowel sounds positive  Musculoskeletal:  edema lower extremities.   Skin: No skin rashes, no ecchymosis    Diagnostics

## 2019-11-07 NOTE — PROGRESS NOTE ADULT - PROBLEM SELECTOR PLAN 1
Will continue current insulin regimen for now. Will continue monitoring FS, log, and FU.  Patient new to insulin and will likely be d/c on insulin. Please start insulin pen injection teaching.  Patient counseled for compliance with consistent low carb diet and exercise as tolerated outpatient. Patient new to insulin and will likely be d/c on insulin. Please start insulin pen injection teaching.  Patient counseled for compliance with consistent low carb diet and exercise as tolerated outpatient.

## 2019-11-07 NOTE — PROVIDER CONTACT NOTE (CRITICAL VALUE NOTIFICATION) - RECOMMENDATIONS
Alexander Davidson NP made aware.. Advised to Hold Heparin and do repeat PTT  . PTT repeated.  199.5.  Stuart FIGUEROA ordered to hold for I hour and then decrease Heparin to 12 ml/hr

## 2019-11-07 NOTE — PROGRESS NOTE ADULT - PROBLEM SELECTOR PLAN 1
continue postop care   continue Asa, Statin,  betablocker - increase toprol 75 qd  continue diuresis   continue pulm toilet  pain management  increase activity as tolerated  Ambulate  H  CVA   PT ordering LE splint   for outpt  Home w/ PT when INR therapeutic thur/fri Continue asa 81 daily, [id number] daily, atorvastatin 80 HS  Titrate up beta-blocker as tolerated  Cough and deep breathe, Incentive Spirometry Q1h, Chest PT.  Ambulate 4x daily as tolerated and with PT.   C/W GI prophylaxis on protonix and DVT prophylaxis on SQ Lovenox.   Hx of CVA   PT ordering LE splint   for outpt  Disposition: Home w/ PT Friday

## 2019-11-07 NOTE — PROGRESS NOTE ADULT - PROBLEM SELECTOR PLAN 2
continue postop care   continue Asa, Statin,  betablocker - increase toprol 75 qd  continue diuresis   continue pulm toilet  pain management  increase activity as tolerated  anticoagulation with heparin and coumdin bridge   Ambulate  H  CVA   PT ordering LE splint   for outpt  Home w/ PT when INR therapeutic thur/fri Continue post-op care  Continue AC on Coumadin, daily PT/INR  Pt will need to follow up in Medicine clinic [address] [place] [address] for PT/INR checks and Coumadin management.  Disposition: Home w/ PT Friday

## 2019-11-08 ENCOUNTER — TRANSCRIPTION ENCOUNTER (OUTPATIENT)
Age: 64
End: 2019-11-08

## 2019-11-08 VITALS
RESPIRATION RATE: 18 BRPM | SYSTOLIC BLOOD PRESSURE: 114 MMHG | HEART RATE: 94 BPM | DIASTOLIC BLOOD PRESSURE: 74 MMHG | OXYGEN SATURATION: 98 % | TEMPERATURE: 98 F

## 2019-11-08 LAB
ANION GAP SERPL CALC-SCNC: 16 MMOL/L — SIGNIFICANT CHANGE UP (ref 5–17)
BUN SERPL-MCNC: 30 MG/DL — HIGH (ref 7–23)
CALCIUM SERPL-MCNC: 9.3 MG/DL — SIGNIFICANT CHANGE UP (ref 8.4–10.5)
CHLORIDE SERPL-SCNC: 100 MMOL/L — SIGNIFICANT CHANGE UP (ref 96–108)
CO2 SERPL-SCNC: 21 MMOL/L — LOW (ref 22–31)
CREAT SERPL-MCNC: 0.75 MG/DL — SIGNIFICANT CHANGE UP (ref 0.5–1.3)
CULTURE RESULTS: SIGNIFICANT CHANGE UP
GLUCOSE BLDC GLUCOMTR-MCNC: 105 MG/DL — HIGH (ref 70–99)
GLUCOSE BLDC GLUCOMTR-MCNC: 156 MG/DL — HIGH (ref 70–99)
GLUCOSE BLDC GLUCOMTR-MCNC: 188 MG/DL — HIGH (ref 70–99)
GLUCOSE SERPL-MCNC: 302 MG/DL — HIGH (ref 70–99)
HCT VFR BLD CALC: 29.1 % — LOW (ref 34.5–45)
HGB BLD-MCNC: 9.5 G/DL — LOW (ref 11.5–15.5)
INR BLD: 2.28 RATIO — HIGH (ref 0.88–1.16)
MCHC RBC-ENTMCNC: 27.5 PG — SIGNIFICANT CHANGE UP (ref 27–34)
MCHC RBC-ENTMCNC: 32.6 GM/DL — SIGNIFICANT CHANGE UP (ref 32–36)
MCV RBC AUTO: 84.1 FL — SIGNIFICANT CHANGE UP (ref 80–100)
NRBC # BLD: 0 /100 WBCS — SIGNIFICANT CHANGE UP (ref 0–0)
PLATELET # BLD AUTO: 528 K/UL — HIGH (ref 150–400)
POTASSIUM SERPL-MCNC: 4.2 MMOL/L — SIGNIFICANT CHANGE UP (ref 3.5–5.3)
POTASSIUM SERPL-SCNC: 4.2 MMOL/L — SIGNIFICANT CHANGE UP (ref 3.5–5.3)
PROTHROM AB SERPL-ACNC: 26.9 SEC — HIGH (ref 10–12.9)
RBC # BLD: 3.46 M/UL — LOW (ref 3.8–5.2)
RBC # FLD: 16.6 % — HIGH (ref 10.3–14.5)
SODIUM SERPL-SCNC: 137 MMOL/L — SIGNIFICANT CHANGE UP (ref 135–145)
SPECIMEN SOURCE: SIGNIFICANT CHANGE UP
WBC # BLD: 9.99 K/UL — SIGNIFICANT CHANGE UP (ref 3.8–10.5)
WBC # FLD AUTO: 9.99 K/UL — SIGNIFICANT CHANGE UP (ref 3.8–10.5)

## 2019-11-08 PROCEDURE — P9059: CPT

## 2019-11-08 PROCEDURE — P9012: CPT

## 2019-11-08 PROCEDURE — 82728 ASSAY OF FERRITIN: CPT

## 2019-11-08 PROCEDURE — C1769: CPT

## 2019-11-08 PROCEDURE — 86923 COMPATIBILITY TEST ELECTRIC: CPT

## 2019-11-08 PROCEDURE — 86803 HEPATITIS C AB TEST: CPT

## 2019-11-08 PROCEDURE — C1889: CPT

## 2019-11-08 PROCEDURE — C1894: CPT

## 2019-11-08 PROCEDURE — 84484 ASSAY OF TROPONIN QUANT: CPT

## 2019-11-08 PROCEDURE — 80053 COMPREHEN METABOLIC PANEL: CPT

## 2019-11-08 PROCEDURE — 94660 CPAP INITIATION&MGMT: CPT

## 2019-11-08 PROCEDURE — 81001 URINALYSIS AUTO W/SCOPE: CPT

## 2019-11-08 PROCEDURE — 83735 ASSAY OF MAGNESIUM: CPT

## 2019-11-08 PROCEDURE — 93880 EXTRACRANIAL BILAT STUDY: CPT

## 2019-11-08 PROCEDURE — 70450 CT HEAD/BRAIN W/O DYE: CPT

## 2019-11-08 PROCEDURE — 93005 ELECTROCARDIOGRAM TRACING: CPT

## 2019-11-08 PROCEDURE — 87641 MR-STAPH DNA AMP PROBE: CPT

## 2019-11-08 PROCEDURE — 83880 ASSAY OF NATRIURETIC PEPTIDE: CPT

## 2019-11-08 PROCEDURE — 71046 X-RAY EXAM CHEST 2 VIEWS: CPT

## 2019-11-08 PROCEDURE — 88305 TISSUE EXAM BY PATHOLOGIST: CPT

## 2019-11-08 PROCEDURE — 93306 TTE W/DOPPLER COMPLETE: CPT

## 2019-11-08 PROCEDURE — 86850 RBC ANTIBODY SCREEN: CPT

## 2019-11-08 PROCEDURE — 33967 INSERT I-AORT PERCUT DEVICE: CPT

## 2019-11-08 PROCEDURE — 94003 VENT MGMT INPAT SUBQ DAY: CPT

## 2019-11-08 PROCEDURE — 83550 IRON BINDING TEST: CPT

## 2019-11-08 PROCEDURE — C8929: CPT

## 2019-11-08 PROCEDURE — 82553 CREATINE MB FRACTION: CPT

## 2019-11-08 PROCEDURE — 85014 HEMATOCRIT: CPT

## 2019-11-08 PROCEDURE — 85027 COMPLETE CBC AUTOMATED: CPT

## 2019-11-08 PROCEDURE — 83540 ASSAY OF IRON: CPT

## 2019-11-08 PROCEDURE — 85396 CLOTTING ASSAY WHOLE BLOOD: CPT

## 2019-11-08 PROCEDURE — 86901 BLOOD TYPING SEROLOGIC RH(D): CPT

## 2019-11-08 PROCEDURE — 93460 R&L HRT ART/VENTRICLE ANGIO: CPT

## 2019-11-08 PROCEDURE — 84132 ASSAY OF SERUM POTASSIUM: CPT

## 2019-11-08 PROCEDURE — P9037: CPT

## 2019-11-08 PROCEDURE — 87640 STAPH A DNA AMP PROBE: CPT

## 2019-11-08 PROCEDURE — 82435 ASSAY OF BLOOD CHLORIDE: CPT

## 2019-11-08 PROCEDURE — 94010 BREATHING CAPACITY TEST: CPT

## 2019-11-08 PROCEDURE — 82947 ASSAY GLUCOSE BLOOD QUANT: CPT

## 2019-11-08 PROCEDURE — C1887: CPT

## 2019-11-08 PROCEDURE — 96374 THER/PROPH/DIAG INJ IV PUSH: CPT

## 2019-11-08 PROCEDURE — 83605 ASSAY OF LACTIC ACID: CPT

## 2019-11-08 PROCEDURE — 83690 ASSAY OF LIPASE: CPT

## 2019-11-08 PROCEDURE — 85610 PROTHROMBIN TIME: CPT

## 2019-11-08 PROCEDURE — 82962 GLUCOSE BLOOD TEST: CPT

## 2019-11-08 PROCEDURE — 82803 BLOOD GASES ANY COMBINATION: CPT

## 2019-11-08 PROCEDURE — 83036 HEMOGLOBIN GLYCOSYLATED A1C: CPT

## 2019-11-08 PROCEDURE — 96375 TX/PRO/DX INJ NEW DRUG ADDON: CPT

## 2019-11-08 PROCEDURE — 83695 ASSAY OF LIPOPROTEIN(A): CPT

## 2019-11-08 PROCEDURE — 97164 PT RE-EVAL EST PLAN CARE: CPT

## 2019-11-08 PROCEDURE — 86900 BLOOD TYPING SEROLOGIC ABO: CPT

## 2019-11-08 PROCEDURE — 99291 CRITICAL CARE FIRST HOUR: CPT | Mod: 25

## 2019-11-08 PROCEDURE — 97161 PT EVAL LOW COMPLEX 20 MIN: CPT

## 2019-11-08 PROCEDURE — 84443 ASSAY THYROID STIM HORMONE: CPT

## 2019-11-08 PROCEDURE — 93926 LOWER EXTREMITY STUDY: CPT

## 2019-11-08 PROCEDURE — 81003 URINALYSIS AUTO W/O SCOPE: CPT

## 2019-11-08 PROCEDURE — 85730 THROMBOPLASTIN TIME PARTIAL: CPT

## 2019-11-08 PROCEDURE — 71045 X-RAY EXAM CHEST 1 VIEW: CPT

## 2019-11-08 PROCEDURE — P9016: CPT

## 2019-11-08 PROCEDURE — 36430 TRANSFUSION BLD/BLD COMPNT: CPT

## 2019-11-08 PROCEDURE — 85384 FIBRINOGEN ACTIVITY: CPT

## 2019-11-08 PROCEDURE — 82565 ASSAY OF CREATININE: CPT

## 2019-11-08 PROCEDURE — P9045: CPT

## 2019-11-08 PROCEDURE — 82330 ASSAY OF CALCIUM: CPT

## 2019-11-08 PROCEDURE — 87086 URINE CULTURE/COLONY COUNT: CPT

## 2019-11-08 PROCEDURE — 84436 ASSAY OF TOTAL THYROXINE: CPT

## 2019-11-08 PROCEDURE — 75635 CT ANGIO ABDOMINAL ARTERIES: CPT

## 2019-11-08 PROCEDURE — 85576 BLOOD PLATELET AGGREGATION: CPT

## 2019-11-08 PROCEDURE — 84295 ASSAY OF SERUM SODIUM: CPT

## 2019-11-08 PROCEDURE — 71250 CT THORAX DX C-: CPT

## 2019-11-08 PROCEDURE — P9047: CPT

## 2019-11-08 PROCEDURE — 84480 ASSAY TRIIODOTHYRONINE (T3): CPT

## 2019-11-08 PROCEDURE — 80048 BASIC METABOLIC PNL TOTAL CA: CPT

## 2019-11-08 PROCEDURE — 86891 AUTOLOGOUS BLOOD OP SALVAGE: CPT

## 2019-11-08 PROCEDURE — 97116 GAIT TRAINING THERAPY: CPT

## 2019-11-08 PROCEDURE — 82550 ASSAY OF CK (CPK): CPT

## 2019-11-08 PROCEDURE — 84100 ASSAY OF PHOSPHORUS: CPT

## 2019-11-08 RX ORDER — WARFARIN SODIUM 2.5 MG/1
1 TABLET ORAL
Qty: 30 | Refills: 0
Start: 2019-11-08 | End: 2019-12-07

## 2019-11-08 RX ORDER — FOLIC ACID 0.8 MG
1 TABLET ORAL
Qty: 30 | Refills: 0
Start: 2019-11-08 | End: 2019-12-07

## 2019-11-08 RX ORDER — INSULIN LISPRO 100/ML
12 VIAL (ML) SUBCUTANEOUS
Qty: 10 | Refills: 0
Start: 2019-11-08 | End: 2019-12-07

## 2019-11-08 RX ORDER — ACETAMINOPHEN 500 MG
2 TABLET ORAL
Qty: 0 | Refills: 0 | DISCHARGE
Start: 2019-11-08

## 2019-11-08 RX ORDER — LISINOPRIL 2.5 MG/1
1 TABLET ORAL
Qty: 0 | Refills: 0 | DISCHARGE

## 2019-11-08 RX ORDER — GLYBURIDE-METFORMIN HYDROCHLORIDE 1.25; 25 MG/1; MG/1
1 TABLET ORAL
Qty: 0 | Refills: 0 | DISCHARGE

## 2019-11-08 RX ORDER — POLYETHYLENE GLYCOL 3350 17 G/17G
17 POWDER, FOR SOLUTION ORAL
Qty: 240 | Refills: 0
Start: 2019-11-08 | End: 2019-11-21

## 2019-11-08 RX ORDER — ASPIRIN/CALCIUM CARB/MAGNESIUM 324 MG
1 TABLET ORAL
Qty: 30 | Refills: 0
Start: 2019-11-08 | End: 2019-12-07

## 2019-11-08 RX ORDER — PANTOPRAZOLE SODIUM 20 MG/1
1 TABLET, DELAYED RELEASE ORAL
Qty: 30 | Refills: 0
Start: 2019-11-08 | End: 2019-12-07

## 2019-11-08 RX ORDER — LISINOPRIL 2.5 MG/1
1 TABLET ORAL
Qty: 30 | Refills: 0
Start: 2019-11-08 | End: 2019-12-07

## 2019-11-08 RX ORDER — FERROUS SULFATE 325(65) MG
1 TABLET ORAL
Qty: 90 | Refills: 0
Start: 2019-11-08 | End: 2019-12-07

## 2019-11-08 RX ORDER — ATORVASTATIN CALCIUM 80 MG/1
1 TABLET, FILM COATED ORAL
Qty: 0 | Refills: 0 | DISCHARGE

## 2019-11-08 RX ORDER — ISOPROPYL ALCOHOL, BENZOCAINE .7; .06 ML/ML; ML/ML
1 SWAB TOPICAL
Qty: 100 | Refills: 1
Start: 2019-11-08 | End: 2020-01-06

## 2019-11-08 RX ORDER — ASPIRIN/CALCIUM CARB/MAGNESIUM 324 MG
1 TABLET ORAL
Qty: 0 | Refills: 0 | DISCHARGE

## 2019-11-08 RX ORDER — ASCORBIC ACID 60 MG
1 TABLET,CHEWABLE ORAL
Qty: 30 | Refills: 0
Start: 2019-11-08 | End: 2019-12-07

## 2019-11-08 RX ORDER — INSULIN GLARGINE 100 [IU]/ML
24 INJECTION, SOLUTION SUBCUTANEOUS
Qty: 10 | Refills: 0
Start: 2019-11-08 | End: 2019-12-07

## 2019-11-08 RX ORDER — ATORVASTATIN CALCIUM 80 MG/1
1 TABLET, FILM COATED ORAL
Qty: 30 | Refills: 0
Start: 2019-11-08 | End: 2019-12-07

## 2019-11-08 RX ORDER — FUROSEMIDE 40 MG
1 TABLET ORAL
Qty: 7 | Refills: 0
Start: 2019-11-08 | End: 2019-11-14

## 2019-11-08 RX ORDER — AMLODIPINE BESYLATE 2.5 MG/1
1 TABLET ORAL
Qty: 0 | Refills: 0 | DISCHARGE

## 2019-11-08 RX ORDER — METOPROLOL TARTRATE 50 MG
1 TABLET ORAL
Qty: 30 | Refills: 0
Start: 2019-11-08 | End: 2019-12-07

## 2019-11-08 RX ORDER — SPIRONOLACTONE 25 MG/1
1 TABLET, FILM COATED ORAL
Qty: 7 | Refills: 0
Start: 2019-11-08 | End: 2019-11-14

## 2019-11-08 RX ADMIN — Medication 1: at 12:15

## 2019-11-08 RX ADMIN — Medication 250 MILLIGRAM(S): at 06:03

## 2019-11-08 RX ADMIN — Medication 250 MILLIGRAM(S): at 17:32

## 2019-11-08 RX ADMIN — ENOXAPARIN SODIUM 40 MILLIGRAM(S): 100 INJECTION SUBCUTANEOUS at 12:16

## 2019-11-08 RX ADMIN — Medication 12 UNIT(S): at 08:05

## 2019-11-08 RX ADMIN — Medication 12 UNIT(S): at 12:15

## 2019-11-08 RX ADMIN — Medication 1: at 08:04

## 2019-11-08 RX ADMIN — Medication 81 MILLIGRAM(S): at 12:16

## 2019-11-08 RX ADMIN — CHLORHEXIDINE GLUCONATE 1 APPLICATION(S): 213 SOLUTION TOPICAL at 12:14

## 2019-11-08 RX ADMIN — Medication 12 UNIT(S): at 17:32

## 2019-11-08 RX ADMIN — PANTOPRAZOLE SODIUM 40 MILLIGRAM(S): 20 TABLET, DELAYED RELEASE ORAL at 06:03

## 2019-11-08 RX ADMIN — Medication 40 MILLIGRAM(S): at 06:03

## 2019-11-08 RX ADMIN — LISINOPRIL 5 MILLIGRAM(S): 2.5 TABLET ORAL at 06:03

## 2019-11-08 RX ADMIN — SPIRONOLACTONE 25 MILLIGRAM(S): 25 TABLET, FILM COATED ORAL at 06:03

## 2019-11-08 RX ADMIN — Medication 100 MILLIGRAM(S): at 06:03

## 2019-11-08 NOTE — PROGRESS NOTE ADULT - PROVIDER SPECIALTY LIST ADULT
CCU
CCU
CT Surgery
CTU
Critical Care
Endocrinology
Internal Medicine
Vascular Surgery
Critical Care
CT Surgery
Endocrinology
Vascular Surgery
CT Surgery
Endocrinology
Endocrinology

## 2019-11-08 NOTE — DISCHARGE NOTE NURSING/CASE MANAGEMENT/SOCIAL WORK - PATIENT PORTAL LINK FT
You can access the FollowMyHealth Patient Portal offered by [facility] by registering at the following website: [email or web address]. By joining [employer]’s FollowMyHealth portal, you will also be able to view your health information using other applications (apps) compatible with our system.

## 2019-11-08 NOTE — DISCHARGE NOTE PROVIDER - NSDCPNSUBOBJ_GEN_ALL_CORE
VITAL SIGNS        Subjective: Denies CP, palpitation, SOB, DILLARD, HA, dizziness, N/V/D, fever or chills.  No acute event noted overnight.         Telemetry: NSR  (PVC'S)        Vital Signs Last 24 Hrs    T(C): 36.9 (19 @ 12:06), Max: 36.9 (19 @ 05:24)    T(F): 98.4 (19 @ 12:06), Max: 98.5 (19 @ 05:24)    HR: 94 (19 @ 12:06) (94 - 107)    BP: 114/74 (19 @ 12:06) (114/74 - 130/73)    RR: 18 (19 @ 12:06) (18 - 18)    SpO2: 98% (19 @ 12:06) (97% - 98%)               @ 07:01  -   @ 07:00    --------------------------------------------------------    IN: 1412 mL / OUT: 3100 mL / NET: -1688 mL         @ 07:01  -   @ 14:01    --------------------------------------------------------    IN: 560 mL / OUT: 900 mL / NET: -340 mL        Daily       Daily Weight in k (2019 07:00)        CAPILLARY BLOOD GLUCOSE        POCT Blood Glucose.: 156 mg/dL (2019 11:20)    POCT Blood Glucose.: 188 mg/dL (2019 07:49)    POCT Blood Glucose.: 161 mg/dL (2019 21:37)    POCT Blood Glucose.: 102 mg/dL (2019 16:54)              PHYSICAL EXAM        Neurology: alert and oriented x 3, nonfocal, no gross deficits        CV: (+) S1 and S2, No murmurs, rubs, gallops or clicks         Sternal Wound:  MSI -->CDI , sternum stable        Lungs: CTA B/L         Abdomen: soft, nontender, nondistended, positive bowel sounds, (+) Flatus; (+) BM         :  Voiding                   Extremities:  B/L LE (+) edema; negative calf tenderness; (+) 2 DP palpable            acetaminophen Tablet .. 650 milliGRAM(s) Oral every 6 hours PRN    aspirin  chewable 81 milliGRAM(s) Oral daily    atorvastatin 80 milliGRAM(s) Oral at bedtime    chlorhexidine 2% Cloths 1 Application(s) Topical <User Schedule>    ciprofloxacin  Tablet 250 milliGRAM(s) Oral two times a day    enoxaparin Injectable 40 milliGRAM(s) SubCutaneous daily    furosemide Tablet 40 milliGRAM(s) Oral daily    glucagon  Injectable 1 milliGRAM(s) IntraMuscular once PRN    insulin glargine Injectable (LANTUS) 24 Unit(s) SubCutaneous at bedtime    insulin lispro (HumaLOG) corrective regimen sliding scale   SubCutaneous three times a day before meals    insulin lispro (HumaLOG) corrective regimen sliding scale   SubCutaneous at bedtime    insulin lispro Injectable (HumaLOG) 12 Unit(s) SubCutaneous three times a day before meals    lisinopril 5 milliGRAM(s) Oral daily    metoprolol succinate  milliGRAM(s) Oral daily    pantoprazole  Tablet 40 milliGRAM(s) Oral before breakfast    polyethylene glycol 3350 17 Gram(s) Oral daily    spironolactone 25 milliGRAM(s) Oral daily        Physical Therapy Rec:   Home  [  ]   Home w/ PT  [ X]  Rehab  [  ]        Discussed with Cardiothoracic Team at AM rounds.        Spent 45 min face to face encounter with patient and discharge note.

## 2019-11-08 NOTE — DISCHARGE NOTE PROVIDER - NSDCACTIVITY_GEN_ALL_CORE
No heavy lifting/straining/Walking - Outdoors allowed/Do not drive or operate machinery/Walking - Indoors allowed/Do not make important decisions/Showering allowed/Stairs allowed

## 2019-11-08 NOTE — DISCHARGE NOTE NURSING/CASE MANAGEMENT/SOCIAL WORK - NSDCPEPTCOWAFU_GEN_ALL_CORE
Go for blood tests as directed. Because your dose is based on the PT/INR blood test, it is very important that you get your blood tested on the scheduled date and time and to keep your health care provider appointments.   Please follow up with your doctor within 3 days of discharge to schedule your next blood test.

## 2019-11-08 NOTE — PROGRESS NOTE ADULT - PROBLEM SELECTOR PROBLEM 2
CAD (coronary artery disease)
Diabetes mellitus
S/P mitral valve replacement
CAD (coronary artery disease)

## 2019-11-08 NOTE — DISCHARGE NOTE PROVIDER - NSDCHHNEEDSERVICE_GEN_ALL_CORE
Medication teaching and assessment/Other, specify.../Observation and assessment/Wound care and assessment

## 2019-11-08 NOTE — DISCHARGE NOTE NURSING/CASE MANAGEMENT/SOCIAL WORK - NSDCPEPTCOWADIET_GEN_ALL_CORE
Keep your intake of vitamin K regular. The highest amount of vitamin K is found in green and leafy vegetables like broccoli, lettuces, cabbage, and spinach. You can eat these foods but keep the portion size the same. Changes in the amount you eat can affect your PT/INR blood test. Contact your doctor before making any major changes in your diet. Limit your alcohol intake.

## 2019-11-08 NOTE — DISCHARGE NOTE PROVIDER - NSDCCPCAREPLAN_GEN_ALL_CORE_FT
PRINCIPAL DISCHARGE DIAGNOSIS  Diagnosis: S/P CABG x 4  Assessment and Plan of Treatment: 1. Daily Shower  2. Weight yourself daily and notify any weight gain greater than 2-3 pounds in 24 hours.  3. Regular diet - low fat, low cholesterol, no added salt.  4. Cleanse Midsternal incision and leg incision daily while showering with warm water and mild soap, pat dry and maintain open to air.   5. Follow Cardiac Surgery Do's and Don'ts discharge instructions.   6. No driving until cleared by MD.   7. No heavy lifting nothing greater than 5 pounds until cleared by MD.   8. Call / Notify MD any fever greater than 101.0  9. Increase Activity as tolerated.      SECONDARY DISCHARGE DIAGNOSES  Diagnosis: S/P MVR (mitral valve replacement)  Assessment and Plan of Treatment: 1.  Notify MD and Dentist the need of antibiotic prophylaxis before any dental procedure to prevent infection of your new valve.   2. Please have your PT/INR levels checked on Sartuday 11/9/19 and then on Tuesday 11/12/19 and then weekly, please have all blood work fax to Dr. Magdalena Jones and follow up with Dr. Mack for your coumadin dosage.  Please fax all result to [phone]    Diagnosis: Pleural effusion  Assessment and Plan of Treatment:     Diagnosis: Pulmonary edema  Assessment and Plan of Treatment: PRINCIPAL DISCHARGE DIAGNOSIS  Diagnosis: S/P CABG x 4  Assessment and Plan of Treatment: 1. Daily Shower  2. Weight yourself daily and notify any weight gain greater than 2-3 pounds in 24 hours.  3. Regular DASH / Diabetic diet - low fat, low cholesterol, no added salt.  4. Cleanse Midsternal incision and leg incision daily while showering with warm water and mild soap, pat dry and maintain open to air.   5. Follow Cardiac Surgery Do's and Don'ts discharge instructions.   6. No driving until cleared by MD.   7. No heavy lifting nothing greater than 5 pounds until cleared by MD.   8. Call / Notify MD any fever greater than 101.0  9. Increase Activity as tolerated.      SECONDARY DISCHARGE DIAGNOSES  Diagnosis: Right foot drop  Assessment and Plan of Treatment: 1. Follow up outpatient with Dr. Marvin Victor Prosthetics abd Orthotics specialist for a right lower foot splint. Please call to office to schedule an appointment at [phone]    Diagnosis: S/P MVR (mitral valve replacement)  Assessment and Plan of Treatment: 1.  Notify MD and Dentist the need of antibiotic prophylaxis before any dental procedure to prevent infection of your new valve.   2. Please have your PT/INR levels checked on Sartuday 11/9/19 and then on Tuesday 11/12/19 and then weekly, please have all blood work fax to Dr. Magdalena Jones and follow up with Dr. Mack for your coumadin dosage.  Please fax all result to [phone]

## 2019-11-08 NOTE — DISCHARGE NOTE PROVIDER - CARE PROVIDERS DIRECT ADDRESSES
[email or web address],DirectAddress_Unknown,DirectAddress_Unknown [email or web address],DirectAddress_Unknown,DirectAddress_Unknown,DirectAddress_Unknown

## 2019-11-08 NOTE — PROGRESS NOTE ADULT - PROBLEM SELECTOR PLAN 4
Will continue statin, primary team FU.
Patient on Lisinopril and Amlodpine per outpatient medication review  -holding home bp meds  -normotensive
Patient on Lisinopril and Amlodpine per outpatient medication review  -lisinopril started
Patient on Lisinopril and Amlodpine per outpatient medication review  -lisinopril started
Will continue statin, primary team FU.
improving LFTs
skyla walker   and  seven [place]
Right CFA thrombus  Vascular surgery following  Continue medical management on asa, coumadin and statin

## 2019-11-08 NOTE — DISCHARGE NOTE PROVIDER - PROVIDER TOKENS
PROVIDER:[TOKEN:[[id number]],SCHEDULEDAPPT:[11/18/2019],SCHEDULEDAPPTTIME:[11:15 AM],ESTABLISHEDPATIENT:[T]],PROVIDER:[TOKEN:[[id number]],FOLLOWUP:[2 weeks]],FREE:[LAST:[Karen],FIRST:[Parris],PHONE:[phone]:[(   )    -],ADDRESS:[[place] [address]   [phone]],SCHEDULEDAPPT:[11/09/2019],SCHEDULEDAPPTTIME:[01:00 PM],ESTABLISHEDPATIENT:[T]] PROVIDER:[TOKEN:[[id number]],SCHEDULEDAPPT:[11/18/2019],SCHEDULEDAPPTTIME:[11:15 AM],ESTABLISHEDPATIENT:[T]],PROVIDER:[TOKEN:[[id number]],FOLLOWUP:[2 weeks]],FREE:[LAST:[Karen],FIRST:[Parris],PHONE:[phone]],FAX:[(   )    -],ADDRESS:[[place] [address]   [phone]],SCHEDULEDAPPT:[11/09/2019],SCHEDULEDAPPTTIME:[01:00 PM],ESTABLISHEDPATIENT:[T]],PROVIDER:[TOKEN:[[id number]],FOLLOWUP:[1 week]]

## 2019-11-08 NOTE — PROGRESS NOTE ADULT - PROBLEM SELECTOR PLAN 1
Will continue current insulin regimen for now. Will continue monitoring FS, log, and FU.  Patient states she knows how to do insulin pen injections and has necessary insulin supplies at home; Suggest to d/c home on current insulin regimen and FU endo 2 weeks.  Patient counseled for compliance with consistent low carb diet and exercise as tolerated outpatient. Will continue current insulin regimen for now. Will continue monitoring FS, log, and FU.

## 2019-11-08 NOTE — DISCHARGE NOTE PROVIDER - CARE PROVIDER_API CALL
Krishna Momin)  Surgery; Thoracic and Cardiac Surgery  [address] [place] [address]  Phone: [phone]  Fax: [phone]  Established Patient  Scheduled Appointment: 11/18/2019 11:15 AM    Mireya Niño)  EndocrinologyMetabDiabetes; Internal Medicine  [address] [place] [address]  Phone: [phone]  Fax: [phone]  Follow Up Time: 2 weeks    Parris Jones  [facility] medicine   [address] [place] [address]   [phone]  Phone: [phone]  Fax: (   )    -  Established Patient  Scheduled Appointment: 11/09/2019 01:00 PM Krishna Momin)  Surgery; Thoracic and Cardiac Surgery  [address] [place] [address]  Phone: [phone]  Fax: [phone]  Established Patient  Scheduled Appointment: 11/18/2019 11:15 AM    Mireya Niño)  EndocrinologyMetabDiabetes; Internal Medicine  [address] [place] [address]  Phone: [phone]  Fax: [phone]  Follow Up Time: 2 weeks    Parris Jones  [facility]   [address] [place] [address]   [phone]  Phone: [phone]  Fax: (   )    -  Established Patient  Scheduled Appointment: 11/09/2019 01:00 PM    Marvin Victor ([place])  OrthoticsProsthetics  Phone: [phone]  Fax: [phone]  Follow Up Time: 1 week

## 2019-11-08 NOTE — PROGRESS NOTE ADULT - PROBLEM SELECTOR PROBLEM 4
Hyperlipidemia
Hyperlipidemia
Hyperglycemia
Hyperlipidemia
Hypertension
Transaminitis
Hyperlipidemia
Thrombus

## 2019-11-08 NOTE — DISCHARGE NOTE PROVIDER - NSDCFUADDAPPT_GEN_ALL_CORE_FT
1. Follow up with Dr. Momin on Monday 11/18/19 at 1:15am for a post op follow up appointment, please call the [facility] office to confirm your appointment at [phone].   2. Please schedule an appointment with a Cardiologist in the [facility] 1st floor  1-2eeks, please call the office to schedule an appointment [phone].   3. Follow up with Dr. Parris Jones on Saturday 11/9/19 at 1:00pm for a hospital follow and PT/INR (Coumadin) blood work, please call the office to confirm your appointment.  4. Please schedule an appointment with Endocrinologist Dr. Niño in 3-4 weeks, please call the office to schedule an appointment. 1. Follow up with Dr. Momin on Monday 11/18/19 at 1:15am for a post op follow up appointment, please call the [facility] office to confirm your appointment at [phone].   2. Please schedule an appointment with a Cardiologist in the [facility] 1st floor  1-2 weeks, please call the office to schedule an appointment [phone].   3. Follow up with Dr. Parris Jones on Saturday 11/9/19 at 1:00pm for a hospital follow and PT/INR (Coumadin) blood work, please call the office to confirm your appointment.  4. Please schedule an appointment with Endocrinologist Dr. Niño in 3-4 weeks, please call the office to schedule an appointment.  5. Follow up outpatient with Dr. Marvin Victor Prosthetics abd Orthotics specialist for a right lower foot splint. Please call to office to schedule an appointment at [phone]

## 2019-11-08 NOTE — DISCHARGE NOTE PROVIDER - HOSPITAL COURSE
62 yo female with DM, HTN, and CVA (requiring cane to ambulate, but no focal deficits)who presented to [facility] with a several hour history of shortness of breath and abdominal pain. Patient states these symptoms started around 4 pm yesterday to this. Prior to this day, patient denies having any similar symptoms prior to yesterday. She had an EKG at OSH that showed ALAN in lead III and ST depressions in V2-V5. The troponin at the OSH was 1.15, and propBNP of 4000. She was given lasix, ASA/Brillinta loaded, and started on heparin drip. She was transferred to [facility] for HLOC given concern for NSTEMI.    s/p 10/221/19 LHC demonstrating severe 3VD and CT Surgery consulted for CABG evaluation.    Endo for glucose control    10/25 Clipping, left atrial appendage    MVR (mitral valve replacement) 31mm tissue valve     CABG x 4  LIMA to OM, DELIA to LAD, SVG to PRICE, Radial artery Y graft to PDA off of SVG     pre-op IABP     Post op pressors, inotropes    Cardiogenic shock    VETO    Ventricular ectopy- amio load    Remains on dobutamine, poor CI/vbg when weaned off.    Acute blood loss anemia prbc    RUE midline for access    Psych for delirium- a&o- anxiety    R foot ischemia/cold limb s/p CTA which shows small area of low flow - on Heparin gtt, vascular is following, will manage medically; starting Coumadin    Remains on  2mcg    10/31  d/c overnight.     Pt with confusion, non conpliant overnight, improvved this am.  She did not take her Lantus, insulin infusion started this Am for hyperglycemia    Lisinopril added for BP and afterload reduction.    Сергей remains in place    The pt's R foot is slightly cooler than L but the patient has no complaints, examined with Dr Suarez.  Will need to keep the PTT therapeutic.    Echo ordered and completed     f/u echo results.  Ambulation, walker and 1 assist for balance.    R foot stable, + doppler signal, q2 hr NV checks.  Maintain therapeutic INR, coumadin/heparin, Diuresis     HD stable, remains NSR 80-90s, lopressor up-titrated 25 mg TID, trending LFT's, coumadin 2mg tonight for INR 1.8. Mediastinal DOM removed w/o difficulty.     PLAN: HOME w/ PT when stable. + dopplers RLE, cont vasc checks q4. hep gtt until INR th    11/3 INR 1.9  Dose coumadin, Hep gtt d/c       INR 1.54 repeating; Hep gtt until PTT resulted, D/C glucerna for hyperglycemia;  this afternoon will discuss with Dr. Salinas Lunsford. Ambulating; rt foot warm + pulse     wbc  stable at 15 afebrile,   rt arterial duplex le done  hep gtt PTT 99   coumadin dosing  7.5     VSS; increase Toprol 75 qd for HR control as per DR. Momin; diuresis; wbc 15- afebrile; no s/s of infection noted; ck ua/ chest xray, shower daily; pw cut today, anticoagulation with coumadin INR 1.8-  coumadin 5 mg     VSS, INR 2.67, Coumadin 2mg. Heparin drip dc'd. WBC trending down 11.6, afebrile. Plan for discharge home in AM, pt and family notified.     VVS; Continue with current medication regimne.  Per Dr. Momin patient is medically cleared for Discharge home with PT today.  Patient to follow up with outpatient PCP Dr. Parris Jones for coumadin dosing.

## 2019-11-08 NOTE — PROGRESS NOTE ADULT - ASSESSMENT
Assessment  DMT2: 63y Female with DM T2 with hyperglycemia, A1C 7.5%, was on oral meds at home, S/P CABG on basal bolus insulin, increased dose yesterday, blood sugars improving, trending within overall acceptable range, no hypoglycemic episode, ambulating, eating full meals, noncompliant with low-carb diet, patient c/o overnight nausea, appears comfortable, planning d/c home.  CAD: S/P CABG 10/25, on medications, no chest pain, stable, monitored.  HTN: Controlled,  on antihypertensive medications.  HLD: On statin, compliant with  intake.          Mireya Niño MD  Cell: [phone]  Office: [phone] Assessment  DMT2: 63y Female with DM T2 with hyperglycemia, A1C 7.5%, was on oral meds at home, S/P CABG on basal bolus insulin, increased dose yesterday,  blood sugars improving, trending within overall acceptable range, no hypoglycemic episode, ambulating, eating full meals, noncompliant with low-carb diet, patient c/o overnight nausea, appears comfortable, planning d/c home.  CAD: S/P CABG 10/25, on medications, no chest pain, stable, monitored.  HTN: Controlled,  on antihypertensive medications.  HLD: On statin, compliant with  intake.          Mireya Niño MD  Cell: [phone]  Office: [phone]

## 2019-11-08 NOTE — DISCHARGE NOTE NURSING/CASE MANAGEMENT/SOCIAL WORK - NSDCPEPTSTRK_GEN_ALL_CORE
Need for follow up after discharge/Stroke education booklet/Call 911 for stroke/Stroke warning signs and symptoms/Signs and symptoms of stroke/Prescribed medications/Risk factors for stroke/Stroke support groups for patients, families, and friends

## 2019-11-08 NOTE — DISCHARGE NOTE NURSING/CASE MANAGEMENT/SOCIAL WORK - NSDCPEPTCOWACOMP_GEN_ALL_CORE
The patient has received written discharge instructions for Warfarin/Coumadin, including the Warfarin/Coumadin discharge booklet, which contains all of the information listed below. Warfarin/Coumadin is used to prevent new blood clots and keep existing ones from getting bigger. Never skip a dose of Warfarin/Coumadin. If you forget to take your Warfarin/Coumadin, DO NOT take an extra pill to 'catch up'. NEVER TAKE A DOUBLE DOSE. Notify your doctor that you missed a dose. Take Warfarin/Coumadin in the evening at the same time. Warfarin/Coumadin may be taken with other medications or food.

## 2019-11-08 NOTE — DISCHARGE NOTE NURSING/CASE MANAGEMENT/SOCIAL WORK - NSDCPEPTCOWADR_GEN_ALL_CORE
Warfarin/Coumadin increases your risk for bleeding. Notify your doctor if you see any bleeding or any of the side effects listed in the Warfarin/Coumadin Booklet. Diet and medications can affect the PT/INR blood level. When Warfarin/Coumadin is taken with other medicines it can change the way other medicines work. Other medicines can also change the way Warfarin/Coumadin works. It is very important to tell your health care provider about all other medicines, including over-the-counter medications, herbs, diet supplements, or products containing vitamin K. Call your doctor before starting, stopping, or changing the dose of any prescription or over-the-counter medications. Any product containing aspirin lessens the blood's ability to form clots and adds to the effect of Warfarin/Coumadin. Never take aspirin without speaking with your health care provider.

## 2019-11-08 NOTE — DISCHARGE NOTE PROVIDER - NSDCMRMEDTOKEN_GEN_ALL_CORE_FT
acetaminophen 325 mg oral tablet: 2 tab(s) orally every 6 hours, As needed, Mild Pain (1 - 3)  Aspir 81 oral delayed release tablet: 1 tab(s) orally once a day  atorvastatin 80 mg oral tablet: 1 tab(s) orally once a day (at bedtime)  Basaglar KwikPen 100 units/mL subcutaneous solution: 24 unit(s) subcutaneous once a day (at bedtime)   BD Ultrathin Pen Needle : 1 application subcutaneous 4 times a day (before meals and at bedtime)   Coumadin 2 mg oral tablet: 1 tab(s) orally once a day   ferrous sulfate 325 mg (65 mg elemental iron) oral delayed release tablet: 1 tab(s) orally 3 times a day   folic acid 1 mg oral tablet: 1 tab(s) orally once a day   furosemide 40 mg oral tablet: 1 tab(s) orally once a day  HumaLOG KwikPen 100 units/mL injectable solution: 12 unit(s) subcutaneous 3 times a day (before meals)   lancets: 1 application subcutaneously 4 times a day before meals and at bedtime   lisinopril 5 mg oral tablet: 1 tab(s) orally once a day  metoprolol succinate 100 mg oral tablet, extended release: 1 tab(s) orally once a day  One touch Glucometer Kit : 1 kit subcutaneous 4 times a day (before meals and at bedtime)   One touch test strips  : 4 application subcutaneous 4 times a day (before meals and at bedtime)   pantoprazole 40 mg oral delayed release tablet: 1 tab(s) orally once a day (before a meal)  polyethylene glycol 3350 oral powder for reconstitution: 17 gram(s) orally once a day, As Needed -for constipation   PT/INR STAT on Saturday 11/9/19 at 1:00 am : PT/INR STAT on Saturday 11/9/19 at 1:00 am  then Tuesday 11/12 then weekly. Follow up with Dr. Jones for maureenamdin dosing (Fax)  [phone]  spironolactone 25 mg oral tablet: 1 tab(s) orally once a day  Vitamin C 500 mg oral tablet: 1 tab(s) orally once a day acetaminophen 325 mg oral tablet: 2 tab(s) orally every 6 hours, As needed, Mild Pain (1 - 3)  Admelog SoloStar 100 units/mL injectable solution: 12 unit(s) subcutaneous 3 times a day (before meals)   alcohol swabs : Apply topically to affected area 4 times a day   Aspir 81 oral delayed release tablet: 1 tab(s) orally once a day  atorvastatin 80 mg oral tablet: 1 tab(s) orally once a day (at bedtime)  Basaglar KwikPen 100 units/mL subcutaneous solution: 24 unit(s) subcutaneous once a day (at bedtime)   BD Ultrathin Pen Needle : 1 application subcutaneous 4 times a day (before meals and at bedtime)   Coumadin 2 mg oral tablet: 1 tab(s) orally once a day   ferrous sulfate 325 mg (65 mg elemental iron) oral delayed release tablet: 1 tab(s) orally 3 times a day   folic acid 1 mg oral tablet: 1 tab(s) orally once a day   furosemide 40 mg oral tablet: 1 tab(s) orally once a day  lancets: 1 application subcutaneously 4 times a day before meals and at bedtime   lisinopril 5 mg oral tablet: 1 tab(s) orally once a day  metoprolol succinate 100 mg oral tablet, extended release: 1 tab(s) orally once a day  One touch Glucometer Kit : 1 kit subcutaneous 4 times a day (before meals and at bedtime)   One touch test strips  : 4 application subcutaneous 4 times a day (before meals and at bedtime)   pantoprazole 40 mg oral delayed release tablet: 1 tab(s) orally once a day (before a meal)  polyethylene glycol 3350 oral powder for reconstitution: 17 gram(s) orally once a day, As Needed -for constipation   PT/INR STAT on Saturday 11/9/19 at 1:00 am : PT/INR STAT on Saturday 11/9/19 at 1:00 am  then Tuesday 11/12 then weekly. Follow up with Dr. Jones for maureenamdin dosing (Fax)  [phone]  spironolactone 25 mg oral tablet: 1 tab(s) orally once a day  Vitamin C 500 mg oral tablet: 1 tab(s) orally once a day

## 2019-11-08 NOTE — PROGRESS NOTE ADULT - ATTENDING COMMENTS
Patient states she knows how to do insulin pen injections and has necessary insulin supplies at home; Suggest to d/c home on current insulin regimen and FU endo 2 weeks.  Patient counseled for compliance with consistent low carb diet and exercise as tolerated outpatient.

## 2019-11-08 NOTE — PROGRESS NOTE ADULT - PROBLEM SELECTOR PROBLEM 1
S/P CABG x 4
Diabetes mellitus
Coronary artery disease of native artery of native heart with stable angina pectoris
Diabetes mellitus
NSTEMI (non-ST elevated myocardial infarction)
S/P CABG x 4
Diabetes mellitus
S/P CABG x 4
Diabetes mellitus

## 2019-11-08 NOTE — PROGRESS NOTE ADULT - REASON FOR ADMISSION
NSTEMI
CABG
NSTEMI
sp MVR   C4B, JARED
NSTEMI
NSTEMI  10/25 MVR C4 BIMA
sp    MVR t,    C4B,  JARED
NSTEMI

## 2019-11-08 NOTE — DISCHARGE NOTE NURSING/CASE MANAGEMENT/SOCIAL WORK - NSDCPEPTCOWAR_GEN_ALL_CORE
Warfarin/Coumadin - Follow up monitoring/Warfarin/Coumadin - Potential for adverse drug reactions and interactions/Warfarin/Coumadin - Compliance/Warfarin/Coumadin - Dietary Advice

## 2019-11-08 NOTE — PROGRESS NOTE ADULT - SUBJECTIVE AND OBJECTIVE BOX
Chief complaint  Patient is a 63y old  Female who presents with a chief complaint of NSTEMI (07 Nov 2019 13:12)   Review of systems  Patient in bed, looks comfortable, no fever, no hypoglycemia.    Labs and Fingersticks  CAPILLARY BLOOD GLUCOSE      POCT Blood Glucose.: 188 mg/dL (08 Nov 2019 07:49)  POCT Blood Glucose.: 161 mg/dL (07 Nov 2019 21:37)  POCT Blood Glucose.: 102 mg/dL (07 Nov 2019 16:54)  POCT Blood Glucose.: 126 mg/dL (07 Nov 2019 11:46)      Anion Gap, Serum: 16 (11-08 @ 09:47)  Anion Gap, Serum: 13 (11-07 @ 01:33)      Calcium, Total Serum: 9.3 (11-08 @ 09:47)  Calcium, Total Serum: 8.7 (11-07 @ 01:33)          11-08    137  |  100  |  30<H>  ----------------------------<  302<H>  4.2   |  21<L>  |  0.75    Ca    9.3      08 Nov 2019 09:47                          9.5    9.99  )-----------( 528      ( 08 Nov 2019 09:47 )             29.1     Medications  MEDICATIONS  (STANDING):  aspirin  chewable 81 milliGRAM(s) Oral daily  atorvastatin 80 milliGRAM(s) Oral at bedtime  chlorhexidine 2% Cloths 1 Application(s) Topical <User Schedule>  ciprofloxacin     Tablet 250 milliGRAM(s) Oral two times a day  dextrose 10% Bolus 125 milliLiter(s) IV Bolus once  dextrose 10% Bolus 250 milliLiter(s) IV Bolus once  dextrose 5%. 1000 milliLiter(s) (50 mL/Hr) IV Continuous <Continuous>  dextrose 50% Injectable 50 milliLiter(s) IV Push every 15 minutes  dextrose 50% Injectable 25 milliLiter(s) IV Push every 15 minutes  enoxaparin Injectable 40 milliGRAM(s) SubCutaneous daily  furosemide    Tablet 40 milliGRAM(s) Oral daily  insulin glargine Injectable (LANTUS) 24 Unit(s) SubCutaneous at bedtime  insulin lispro (HumaLOG) corrective regimen sliding scale   SubCutaneous three times a day before meals  insulin lispro (HumaLOG) corrective regimen sliding scale   SubCutaneous at bedtime  insulin lispro Injectable (HumaLOG) 12 Unit(s) SubCutaneous three times a day before meals  lisinopril 5 milliGRAM(s) Oral daily  metoprolol succinate  milliGRAM(s) Oral daily  pantoprazole    Tablet 40 milliGRAM(s) Oral before breakfast  polyethylene glycol 3350 17 Gram(s) Oral daily  spironolactone 25 milliGRAM(s) Oral daily      Physical Exam  General: Patient comfortable in bed  Vital Signs Last 12 Hrs  T(F): 98.5 (11-08-19 @ 05:24), Max: 98.5 (11-08-19 @ 05:24)  HR: 98 (11-08-19 @ 05:24) (98 - 98)  BP: 124/867 (11-08-19 @ 05:24) (124/867 - 124/867)  BP(mean): --  RR: 18 (11-08-19 @ 05:24) (18 - 18)  SpO2: 97% (11-08-19 @ 05:24) (97% - 97%)  Neck: No palpable thyroid nodules.  CVS: S1S2, No murmurs  Respiratory: No wheezing, no crepitations  GI: Abdomen soft, bowel sounds positive  Musculoskeletal:  edema lower extremities.   Skin: No skin rashes, no ecchymosis    Diagnostics Chief complaint  Patient is a 63y old  Female who presents with a chief complaint of NSTEMI (07 Nov 2019 13:12)   Review of systems  Patient in bed, looks comfortable, no fever,  no hypoglycemia.    Labs and Fingersticks  CAPILLARY BLOOD GLUCOSE      POCT Blood Glucose.: 188 mg/dL (08 Nov 2019 07:49)  POCT Blood Glucose.: 161 mg/dL (07 Nov 2019 21:37)  POCT Blood Glucose.: 102 mg/dL (07 Nov 2019 16:54)  POCT Blood Glucose.: 126 mg/dL (07 Nov 2019 11:46)      Anion Gap, Serum: 16 (11-08 @ 09:47)  Anion Gap, Serum: 13 (11-07 @ 01:33)      Calcium, Total Serum: 9.3 (11-08 @ 09:47)  Calcium, Total Serum: 8.7 (11-07 @ 01:33)          11-08    137  |  100  |  30<H>  ----------------------------<  302<H>  4.2   |  21<L>  |  0.75    Ca    9.3      08 Nov 2019 09:47                          9.5    9.99  )-----------( 528      ( 08 Nov 2019 09:47 )             29.1     Medications  MEDICATIONS  (STANDING):  aspirin  chewable 81 milliGRAM(s) Oral daily  atorvastatin 80 milliGRAM(s) Oral at bedtime  chlorhexidine 2% Cloths 1 Application(s) Topical <User Schedule>  ciprofloxacin     Tablet 250 milliGRAM(s) Oral two times a day  dextrose 10% Bolus 125 milliLiter(s) IV Bolus once  dextrose 10% Bolus 250 milliLiter(s) IV Bolus once  dextrose 5%. 1000 milliLiter(s) (50 mL/Hr) IV Continuous <Continuous>  dextrose 50% Injectable 50 milliLiter(s) IV Push every 15 minutes  dextrose 50% Injectable 25 milliLiter(s) IV Push every 15 minutes  enoxaparin Injectable 40 milliGRAM(s) SubCutaneous daily  furosemide    Tablet 40 milliGRAM(s) Oral daily  insulin glargine Injectable (LANTUS) 24 Unit(s) SubCutaneous at bedtime  insulin lispro (HumaLOG) corrective regimen sliding scale   SubCutaneous three times a day before meals  insulin lispro (HumaLOG) corrective regimen sliding scale   SubCutaneous at bedtime  insulin lispro Injectable (HumaLOG) 12 Unit(s) SubCutaneous three times a day before meals  lisinopril 5 milliGRAM(s) Oral daily  metoprolol succinate  milliGRAM(s) Oral daily  pantoprazole    Tablet 40 milliGRAM(s) Oral before breakfast  polyethylene glycol 3350 17 Gram(s) Oral daily  spironolactone 25 milliGRAM(s) Oral daily      Physical Exam  General: Patient comfortable in bed  Vital Signs Last 12 Hrs  T(F): 98.5 (11-08-19 @ 05:24), Max: 98.5 (11-08-19 @ 05:24)  HR: 98 (11-08-19 @ 05:24) (98 - 98)  BP: 124/867 (11-08-19 @ 05:24) (124/867 - 124/867)  BP(mean): --  RR: 18 (11-08-19 @ 05:24) (18 - 18)  SpO2: 97% (11-08-19 @ 05:24) (97% - 97%)  Neck: No palpable thyroid nodules.  CVS: S1S2, No murmurs  Respiratory: No wheezing, no crepitations  GI: Abdomen soft, bowel sounds positive  Musculoskeletal:  edema lower extremities.   Skin: No skin rashes, no ecchymosis    Diagnostics

## 2019-11-14 ENCOUNTER — INPATIENT (INPATIENT)
Facility: HOSPITAL | Age: 64
LOS: 0 days | Discharge: HOME | DRG: 313 | End: 2019-11-15
Attending: THORACIC SURGERY (CARDIOTHORACIC VASCULAR SURGERY) | Admitting: THORACIC SURGERY (CARDIOTHORACIC VASCULAR SURGERY)
Payer: MEDICAID

## 2019-11-14 VITALS
WEIGHT: 130.07 LBS | HEART RATE: 103 BPM | TEMPERATURE: 99 F | SYSTOLIC BLOOD PRESSURE: 130 MMHG | OXYGEN SATURATION: 97 % | DIASTOLIC BLOOD PRESSURE: 59 MMHG | HEIGHT: 64 IN | RESPIRATION RATE: 17 BRPM

## 2019-11-14 DIAGNOSIS — Z95.1 PRESENCE OF AORTOCORONARY BYPASS GRAFT: Chronic | ICD-10-CM

## 2019-11-14 DIAGNOSIS — Z95.2 PRESENCE OF PROSTHETIC HEART VALVE: ICD-10-CM

## 2019-11-14 DIAGNOSIS — L76.82 OTHER POSTPROCEDURAL COMPLICATIONS OF SKIN AND SUBCUTANEOUS TISSUE: ICD-10-CM

## 2019-11-14 DIAGNOSIS — R07.9 CHEST PAIN, UNSPECIFIED: ICD-10-CM

## 2019-11-14 DIAGNOSIS — Z95.1 PRESENCE OF AORTOCORONARY BYPASS GRAFT: ICD-10-CM

## 2019-11-14 DIAGNOSIS — E11.9 TYPE 2 DIABETES MELLITUS WITHOUT COMPLICATIONS: ICD-10-CM

## 2019-11-14 LAB
APTT BLD: 31.7 SEC — SIGNIFICANT CHANGE UP (ref 27.5–36.3)
BASOPHILS # BLD AUTO: 0.03 K/UL — SIGNIFICANT CHANGE UP (ref 0–0.2)
BASOPHILS NFR BLD AUTO: 0.4 % — SIGNIFICANT CHANGE UP (ref 0–2)
CK MB CFR SERPL CALC: 2.5 NG/ML — SIGNIFICANT CHANGE UP (ref 0–3.8)
CK SERPL-CCNC: 22 U/L — LOW (ref 25–170)
EOSINOPHIL # BLD AUTO: 0.32 K/UL — SIGNIFICANT CHANGE UP (ref 0–0.5)
EOSINOPHIL NFR BLD AUTO: 3.7 % — SIGNIFICANT CHANGE UP (ref 0–6)
GLUCOSE BLDC GLUCOMTR-MCNC: 195 MG/DL — HIGH (ref 70–99)
GLUCOSE BLDC GLUCOMTR-MCNC: 87 MG/DL — SIGNIFICANT CHANGE UP (ref 70–99)
HCT VFR BLD CALC: 36.7 % — SIGNIFICANT CHANGE UP (ref 34.5–45)
HGB BLD-MCNC: 12.1 G/DL — SIGNIFICANT CHANGE UP (ref 11.5–15.5)
IMM GRANULOCYTES NFR BLD AUTO: 0.7 % — SIGNIFICANT CHANGE UP (ref 0–1.5)
INR BLD: 1.22 RATIO — HIGH (ref 0.88–1.16)
LIDOCAIN IGE QN: 66 U/L — HIGH (ref 7–60)
LYMPHOCYTES # BLD AUTO: 2.01 K/UL — SIGNIFICANT CHANGE UP (ref 1–3.3)
LYMPHOCYTES # BLD AUTO: 23.5 % — SIGNIFICANT CHANGE UP (ref 13–44)
MAGNESIUM SERPL-MCNC: 2.1 MG/DL — SIGNIFICANT CHANGE UP (ref 1.6–2.6)
MCHC RBC-ENTMCNC: 26.7 PG — LOW (ref 27–34)
MCHC RBC-ENTMCNC: 33 GM/DL — SIGNIFICANT CHANGE UP (ref 32–36)
MCV RBC AUTO: 81 FL — SIGNIFICANT CHANGE UP (ref 80–100)
MONOCYTES # BLD AUTO: 0.7 K/UL — SIGNIFICANT CHANGE UP (ref 0–0.9)
MONOCYTES NFR BLD AUTO: 8.2 % — SIGNIFICANT CHANGE UP (ref 2–14)
NEUTROPHILS # BLD AUTO: 5.42 K/UL — SIGNIFICANT CHANGE UP (ref 1.8–7.4)
NEUTROPHILS NFR BLD AUTO: 63.5 % — SIGNIFICANT CHANGE UP (ref 43–77)
NRBC # BLD: 0 /100 WBCS — SIGNIFICANT CHANGE UP (ref 0–0)
PLATELET # BLD AUTO: 540 K/UL — HIGH (ref 150–400)
PROTHROM AB SERPL-ACNC: 14 SEC — HIGH (ref 10–12.9)
RBC # BLD: 4.53 M/UL — SIGNIFICANT CHANGE UP (ref 3.8–5.2)
RBC # FLD: 15.5 % — HIGH (ref 10.3–14.5)
TROPONIN T, HIGH SENSITIVITY RESULT: 190 NG/L — HIGH (ref 0–51)
TROPONIN T, HIGH SENSITIVITY RESULT: 196 NG/L — HIGH (ref 0–51)
TROPONIN T, HIGH SENSITIVITY RESULT: 217 NG/L — HIGH (ref 0–51)
WBC # BLD: 8.54 K/UL — SIGNIFICANT CHANGE UP (ref 3.8–10.5)
WBC # FLD AUTO: 8.54 K/UL — SIGNIFICANT CHANGE UP (ref 3.8–10.5)

## 2019-11-14 PROCEDURE — 99024 POSTOP FOLLOW-UP VISIT: CPT

## 2019-11-14 PROCEDURE — 93010 ELECTROCARDIOGRAM REPORT: CPT | Mod: 76

## 2019-11-14 PROCEDURE — 99223 1ST HOSP IP/OBS HIGH 75: CPT | Mod: GC

## 2019-11-14 PROCEDURE — 99285 EMERGENCY DEPT VISIT HI MDM: CPT

## 2019-11-14 PROCEDURE — 71045 X-RAY EXAM CHEST 1 VIEW: CPT | Mod: 26

## 2019-11-14 PROCEDURE — 93308 TTE F-UP OR LMTD: CPT | Mod: 26

## 2019-11-14 PROCEDURE — 93306 TTE W/DOPPLER COMPLETE: CPT | Mod: 26

## 2019-11-14 RX ORDER — METOPROLOL TARTRATE 50 MG
100 TABLET ORAL DAILY
Refills: 0 | Status: DISCONTINUED | OUTPATIENT
Start: 2019-11-14 | End: 2019-11-15

## 2019-11-14 RX ORDER — ATORVASTATIN CALCIUM 80 MG/1
80 TABLET, FILM COATED ORAL AT BEDTIME
Refills: 0 | Status: DISCONTINUED | OUTPATIENT
Start: 2019-11-14 | End: 2019-11-15

## 2019-11-14 RX ORDER — ACETAMINOPHEN 500 MG
650 TABLET ORAL EVERY 6 HOURS
Refills: 0 | Status: DISCONTINUED | OUTPATIENT
Start: 2019-11-14 | End: 2019-11-15

## 2019-11-14 RX ORDER — INSULIN GLARGINE 100 [IU]/ML
24 INJECTION, SOLUTION SUBCUTANEOUS AT BEDTIME
Refills: 0 | Status: DISCONTINUED | OUTPATIENT
Start: 2019-11-14 | End: 2019-11-15

## 2019-11-14 RX ORDER — WARFARIN SODIUM 2.5 MG/1
4 TABLET ORAL ONCE
Refills: 0 | Status: COMPLETED | OUTPATIENT
Start: 2019-11-14 | End: 2019-11-14

## 2019-11-14 RX ORDER — INSULIN LISPRO 100/ML
VIAL (ML) SUBCUTANEOUS AT BEDTIME
Refills: 0 | Status: DISCONTINUED | OUTPATIENT
Start: 2019-11-14 | End: 2019-11-15

## 2019-11-14 RX ORDER — ASPIRIN/CALCIUM CARB/MAGNESIUM 324 MG
81 TABLET ORAL DAILY
Refills: 0 | Status: DISCONTINUED | OUTPATIENT
Start: 2019-11-14 | End: 2019-11-15

## 2019-11-14 RX ORDER — INSULIN LISPRO 100/ML
12 VIAL (ML) SUBCUTANEOUS
Refills: 0 | Status: DISCONTINUED | OUTPATIENT
Start: 2019-11-14 | End: 2019-11-15

## 2019-11-14 RX ORDER — POLYETHYLENE GLYCOL 3350 17 G/17G
17 POWDER, FOR SOLUTION ORAL DAILY
Refills: 0 | Status: DISCONTINUED | OUTPATIENT
Start: 2019-11-14 | End: 2019-11-15

## 2019-11-14 RX ORDER — PANTOPRAZOLE SODIUM 20 MG/1
40 TABLET, DELAYED RELEASE ORAL
Refills: 0 | Status: DISCONTINUED | OUTPATIENT
Start: 2019-11-14 | End: 2019-11-15

## 2019-11-14 RX ORDER — LISINOPRIL 2.5 MG/1
5 TABLET ORAL DAILY
Refills: 0 | Status: DISCONTINUED | OUTPATIENT
Start: 2019-11-14 | End: 2019-11-15

## 2019-11-14 RX ORDER — ASPIRIN/CALCIUM CARB/MAGNESIUM 324 MG
162 TABLET ORAL ONCE
Refills: 0 | Status: COMPLETED | OUTPATIENT
Start: 2019-11-14 | End: 2019-11-14

## 2019-11-14 RX ORDER — INSULIN LISPRO 100/ML
VIAL (ML) SUBCUTANEOUS
Refills: 0 | Status: DISCONTINUED | OUTPATIENT
Start: 2019-11-14 | End: 2019-11-15

## 2019-11-14 RX ORDER — INFLUENZA VIRUS VACCINE 15; 15; 15; 15 UG/.5ML; UG/.5ML; UG/.5ML; UG/.5ML
0.5 SUSPENSION INTRAMUSCULAR ONCE
Refills: 0 | Status: DISCONTINUED | OUTPATIENT
Start: 2019-11-14 | End: 2019-11-15

## 2019-11-14 RX ORDER — ACETAMINOPHEN 500 MG
650 TABLET ORAL ONCE
Refills: 0 | Status: COMPLETED | OUTPATIENT
Start: 2019-11-14 | End: 2019-11-14

## 2019-11-14 RX ADMIN — Medication 12 UNIT(S): at 18:46

## 2019-11-14 RX ADMIN — Medication 650 MILLIGRAM(S): at 09:46

## 2019-11-14 RX ADMIN — Medication 650 MILLIGRAM(S): at 23:50

## 2019-11-14 RX ADMIN — Medication 650 MILLIGRAM(S): at 22:54

## 2019-11-14 RX ADMIN — ATORVASTATIN CALCIUM 80 MILLIGRAM(S): 80 TABLET, FILM COATED ORAL at 22:54

## 2019-11-14 RX ADMIN — Medication 162 MILLIGRAM(S): at 09:46

## 2019-11-14 RX ADMIN — Medication 1: at 18:46

## 2019-11-14 RX ADMIN — WARFARIN SODIUM 4 MILLIGRAM(S): 2.5 TABLET ORAL at 22:59

## 2019-11-14 RX ADMIN — Medication 162 MILLIGRAM(S): at 08:42

## 2019-11-14 NOTE — ED PROVIDER NOTE - PROGRESS NOTE DETAILS
ct surg/vatsia - to eval -- cards called for st elev moer prom than prev in inf leads -- no cath as per cards  will continue to monitor cards called for st elev more prom than prev in inf leads -- no cath as per cards  will continue to monitor 1st rop 190  -downtrending from previous admission will follow serially 2nd trop 217 -- pt will need admission - ct surg repaged Johnny PGY3: pt feels improved at current, no chest pain right now, spoke to ct surgery about concerns of new chest pain and troponin elevation and that msk pain should be an exclusionary diagnosis given pts history, they recommended cardiology weigh in, they do not want to readmit to their service at this time, cardiology is involved but their attdng cannot round until this afternoon. Turner PGY3: pt chest pain free, spoke w/ ct surg again regarding admission and accepted to their service for observation for rpt chest pain and trending trops. Pt and family amenable to admission

## 2019-11-14 NOTE — CHART NOTE - NSCHARTNOTEFT_GEN_A_CORE
Registration Time:  Initial EC:57AM  Called by ED: 8:19AM  Saw patient at bedside: 8:25AM    Initially called for STEMI, EKG reviewed, did not meet STEMI criteria and is unchanged from prior EKG 1 week ago.  In short, pt is a 62 y/o F w/ DM, HTN, CVA, recent IWSTEMI s/p CABG x4 (1 week ago) coming in with pleuritic CP since 7  AM this morning.  States she woke up with the pain, described as a squeezing with mild pleuritic component.  At bedside, pt HDS, asx, exam significant for sclerosing surgical scar anterior chest wall this is exquisitely TTP, otherwise no signs of decompensated heart failure, WWP.  Bedside POC U/S showed hyperechoic density anterior to RV, otherwise no effusion noted.   Repeat EKG 45 minutes later without any change, inferior q-waves w/ lateral t-wave flattening. Not an acute STEMI, and concerned for musculoskeletal vs pericardial etiology in se for CP.  Obtain formal TTE, ESR/CRP.  Would c/s CTS given recent CABG and severe musculoskeletal pain.  If formal general cardiology consult is needed after workup, please call.    rC Enamorado MD  Cardiology Fellow - PGY 4  Text or Call: [phone]  For all New Consults and Questions:  [email or web address]   Login: [email or web address] - ( 2019 08:48 )  PTT:31.7 sec

## 2019-11-14 NOTE — H&P ADULT - PROBLEM SELECTOR PLAN 1
Continue asa 81 daily, ToprolXL 100 daily, atorvastatin 80 HS  Ambulate TID  Continue lisinopril 5 daily  Euvolemic - off diuretics  Disposition: Home when medically cleared  F/U appointment with Dr. Momin on Monday 11/18 at 11:15am

## 2019-11-14 NOTE — ED PROVIDER NOTE - PHYSICAL EXAMINATION
Gen: alert and orient, NAD  Head: NCAT  ENT: Airway patent, dry mucous membranes, nasal passageways clear, no JVD  Cardiac: Normal rate, normal rhythm, no murmurs  Respiratory: crackles b/l lung bases  Gastrointestinal: Abdomen soft, nontender, nondistended, no rebound, no guarding  MSK: No gross abnormalities, FROM of all four extremities, no edema  HEME: Extremities warm and well perfused  Skin: midline sternal sutures appear well healing without erythema, fluctuance or crepitus, +left forearm sutures appear well healing without erythema/crepitus/fluctuance  Neuro: No gross neurologic deficits

## 2019-11-14 NOTE — CONSULT NOTE ADULT - ATTENDING COMMENTS
Patient interviewed and examined. I was physically present for the essential portions of the E/M service provided.  Chart reviewed and note edited where appropriate.  Case discussed with fellow.  Agree w/ Assessment and Plan as outlined.  ESR elevated w/o evidence of sternal wound infection.  Suspect post-cardiotomy syndrome.      Curtis Hester MD [facility]  Spectra:  [phone]  Office: [phone]

## 2019-11-14 NOTE — H&P ADULT - PROBLEM SELECTOR PLAN 2
Continue AC on Coumadin  Check daily PT/INR  Pt given prescription to check PT/INR Saturday 11/16 and have results faxed to PCP Dr. Jones for Coumadin management  F/U appointment with Dr. Momin on Monday 11/18 at 11:15am

## 2019-11-14 NOTE — H&P ADULT - PROBLEM SELECTOR PLAN 4
-Per Cardiology will obtain TTE and if recurrence CP, elevated CK-MBs or further uptrending troponins, will consider for cardiac cath

## 2019-11-14 NOTE — ED ADULT NURSE NOTE - NSIMPLEMENTINTERV_GEN_ALL_ED
Implemented All Fall with Harm Risk Interventions:  [place] to call system. Call bell, personal items and telephone within reach. Instruct patient to call for assistance. Room bathroom lighting operational. Non-slip footwear when patient is off stretcher. Physically safe environment: no spills, clutter or unnecessary equipment. Stretcher in lowest position, wheels locked, appropriate side rails in place. Provide visual cue, wrist band, yellow gown, etc. Monitor gait and stability. Monitor for mental status changes and reorient to person, place, and time. Review medications for side effects contributing to fall risk. Reinforce activity limits and safety measures with patient and family. Provide visual clues: red socks.

## 2019-11-14 NOTE — H&P ADULT - HISTORY OF PRESENT ILLNESS
64 y/o female with PMHx of DM2 on insulin, HTN, and CVA (requiring cane to ambulate, but no focal deficits), CAD/NSTEMI s/p 10/25 JARED Clipping, MVR tissue 31mm valve, CABG x 4 LIMA now presenting to [facility] ED with c/o midsternal non-radiating chest pain a/w mild dyspnea, acute onset approx 7am this morning. Family immediately brought pt into ED, pain has since resolved after taking Tylenol. Pt denies any recent fevers/chills, abd pain, nausea, or vomiting, or cough.

## 2019-11-14 NOTE — ED PROVIDER NOTE - OBJECTIVE STATEMENT
63F hx DM, HTN, CVA, inf wall stemi s/p CABG x4 (LIMA to OM, DELIA to LAD, SVG to PRICE, Radial artery Y graft to PDA) 10/26 and left mitral valve replacement/Clipping of left atrial appendage 10/25 (dr Mccoy), presenting w/ midsternal squeezing 8/10 nonradiating chest pain with mild dyspnea, nonpositional and nonexertional, onset approx 7am this morning. No recents fevers/chills. No associated abd pain, nausea, or vomiting, or cough.

## 2019-11-14 NOTE — H&P ADULT - NSHPREVIEWOFSYSTEMS_GEN_ALL_CORE
CONSTITUTIONAL: No fever, weight loss, or fatigue  EYES: No eye pain, visual disturbances, or discharge  ENMT:  No tinnitus, vertigo, dysphagia  RESPIRATORY: No cough, SOB, wheezing, chills or hemoptysis  CARDIOVASCULAR: No chest pain, palpitations, dizziness, or leg swelling  GASTROINTESTINAL: No abdominal pain. No nausea, vomiting, or diarrhea.  GENITOURINARY: No dysuria, frequency, hematuria, or incontinence.  NEUROLOGICAL: No headaches, memory loss, loss of strength, numbness, or tremors  SKIN: No itching, burning, rashes, or lesions   ENDOCRINE: No heat or cold intolerance  MUSCULOSKELETAL: No joint pain or swelling

## 2019-11-14 NOTE — CONSULT NOTE ADULT - SUBJECTIVE AND OBJECTIVE BOX
Patient seen and evaluated at bedside    Chief Complaint:  chest pain    HPI:   63F hx of DM, HTN, CVA, recent NSTEMI 10/22 found to have 3vd s/p CABG (LIMA OM, DELIA LAD, SVG PRICE, radial off SVG to PDA), CFA thrombus on Coumadin, here for CP this AM.  Pt states she was in her USOH when in this AM developed substernal squeezing chest pain that last for 15 minutes.  Mild pleuritic component.  Pt took her usual meds however concern from family brought her to the hospital.  EKG in ED obtained concerned for STEMI, cardiology was called however EKG unchanged and did not meet STEMI.  CP resolved upon initial eval, however further workup showed elevated troponin 190.   Unclear medication compliance.  Cardiology was consulted for further eval of elevated troponin.    PMHx:   Cerebrovascular accident (CVA)  Hyperlipidemia  Diabetes mellitus      PSHx:   S/P CABG (coronary artery bypass graft)  No significant past surgical history      Allergies:  No Known Allergies      Home Meds: per med rec    Current Medications:       FAMILY HISTORY:  No pertinent family history in first degree relatives      Social History:  Smoking History: denies  Alcohol Use: denies  Drug Use: denies    REVIEW OF SYSTEMS:  Constitutional:     [x ] negative [ ] fevers [ ] chills [ ] weight loss [ ] weight gain  HEENT:                  [x ] negative [ ] dry eyes [ ] eye irritation [ ] postnasal drip [ ] nasal congestion  CV:                         [ x] negative  [ ] chest pain [ ] orthopnea [ ] palpitations [ ] murmur  Resp:                     [x ] negative [ ] cough [ ] shortness of breath [ ] dyspnea [ ] wheezing [ ] sputum [ ]hemoptysis  GI:                          [ x] negative [ ] nausea [ ] vomiting [ ] diarrhea [ ] constipation [ ] abd pain [ ] dysphagia   :                        [ x] negative [ ] dysuria [ ] nocturia [ ] hematuria [ ] increased urinary frequency  Musculoskeletal: [x ] negative [ ] back pain [ ] myalgias [ ] arthralgias [ ] fracture  Skin:                       [ x] negative [ ] rash [ ] itch  Neurological:        [ x] negative [ ] headache [ ] dizziness [ ] syncope [ ] weakness [ ] numbness  Psychiatric:           [ x] negative [ ] anxiety [ ] depression  Endocrine:            [ x] negative [ ] diabetes [ ] thyroid problem  Heme/Lymph:      [ x] negative [ ] anemia [ ] bleeding problem  Allergic/Immune: [ x] negative [ ] itchy eyes [ ] nasal discharge [ ] hives [ ] angioedema    [ x] All other systems negative  [ ] Unable to assess ROS due to      Physical Exam:  T(F): 98.1 (11-14), Max: 98.6 (11-14)  HR: 97 (11-14) (97 - 103)  BP: 111/73 (11-14) (111/73 - 130/59)  RR: 18 (11-14)  SpO2: 97% (11-14)  GENERAL: No acute distress, well-developed  HEAD:  Atraumatic, Normocephalic  ENT: EOMI, PERRLA, conjunctiva and sclera clear, Neck supple, No JVD, moist mucosa  CHEST: surgical scar chest wall, mod-severely TTP, no gross exudates appreciated, normal s1/s2, no mrg appreciated no exudate   LUNG: Clear to auscultation bilaterally; No wheeze, equal breath sounds bilaterally   ABDOMEN: Soft, Nontender, Nondistended; Bowel sounds present  EXTREMITIES:  No clubbing, cyanosis, or edema    LINES:    Cardiovascular Diagnostic Testing:    ECG: Personally reviewed:  NSR@ 102BPM, qwaves III/aVF    Cath: 10/22/19  CORONARY VESSELS: The coronary circulation is right dominant.  LM:   --  LM: Normal.  LAD:   --  Mid LAD: There was a 100 % stenosis.  CX:   -- Proximal circumflex: There was a 95 % stenosis.  RCA:   --  RPDA: There was a 99 % stenosis.  --  RPL1: There was a 99 % stenosis.                            12.1   8.54  )-----------( 540      ( 14 Nov 2019 08:48 )             36.7     11-14    135  |  97  |  63<H>  ----------------------------<  137<H>  4.8   |  19<L>  |  1.10    Ca    9.8      14 Nov 2019 08:48  Mg     2.1     11-14    TPro  8.0  /  Alb  4.0  /  TBili  0.5  /  DBili  x   /  AST  20  /  ALT  28  /  AlkPhos  122<H>  11-14    PT/INR - ( 14 Nov 2019 08:48 )   PT: 14.0 sec;   INR: 1.22 ratio         PTT - ( 14 Nov 2019 08:48 )  PTT:31.7 sec

## 2019-11-14 NOTE — CONSULT NOTE ADULT - ASSESSMENT
63F hx of DM, HTN, CVA, recent NSTEMI 10/22 found to have 3vd s/p CABG - 10/25/19 (NARVAEZ OM, DELIA LAD, SVG PRICE, radial off SVG to PDA), CFA thrombus on Coumadin, here for CP this AM, found to have elevated troponin.    #CP  -EKG w/o ischemic changes, HDS, CP resolved upon evaluation  -elevated trop 191, repeat 210  -unclear significance of trop in setting of recent CABG  -will trend trop further, obtain CK-MB  -obtain TTE  -if recurrence CP, elevated CK-MBs or further uptrending troponins, will consider for cardiac cath  -chest wall exquisitely TTP; awaiting CTS c/s  -c/w ASA 81mg  -INR subtherapeutic, consider alternative dx of PE    Will discuss w/ Dr. Mir Enamorado MD  Cardiology Fellow - PGY 4  Text or Call: [phone]  For all New Consults and Questions:  [email or web address]   Login: [email or web address] 63F hx of DM, HTN, CVA, recent NSTEMI 10/22 found to have 3vd s/p CABG - 10/25/19 (NARVAEZ OM, DELIA LAD, SVG PRICE, radial off SVG to PDA), CFA thrombus on Coumadin, here for CP this AM, found to have elevated troponin.    #CP  -EKG w/o ischemic changes, HDS, CP resolved upon evaluation  -elevated trop 191, repeat 210  -likely type V NSTEMI  -TTE grossly unchanged from prior  -in setting of normal CK-MB, pain more likely post-surgical, possible pericarditis vs poststernotamy mediastinitis  -if CTS agrees, would start colchicine 0.6mg daily  -c/w ASA 81mg  -INR subtherapeutic, hep gtt to coumadin bridge per CTS given CFA thrombus    Above recs are final.  If you have any questions, please give us a call.  We will sign off at this time.  Discussed w/ Dr. Mir Enamorado MD  Cardiology Fellow - PGY 4  Text or Call: [phone]  For all New Consults and Questions:  [email or web address]   Login: [place] 63F hx of DM, HTN, CVA, recent NSTEMI 10/22 found to have 3vd s/p CABG - 10/25/19 (NARVAEZ OM, DELIA LAD, SVG PRICE, radial off SVG to PDA), CFA thrombus on Coumadin, here for CP this AM, found to have elevated troponin.    #CP  -EKG w/o ischemic changes, HDS, CP resolved upon evaluation  -elevated trop 191, repeat 210  -likely type V NSTEMI  -TTE grossly unchanged from prior  -in setting of normal CK-MB, pain more likely post-surgical, possible post-pericardiotomy syndrome  -if CTS agrees, would start colchicine 0.6mg daily  -c/w ASA 81mg  -INR subtherapeutic, hep gtt to coumadin bridge per CTS given CFA thrombus    Above recs are final.  If you have any questions, please give us a call.  We will sign off at this time.  Discussed w/ Dr. Mir Enamorado MD  Cardiology Fellow - PGY 4  Text or Call: [phone]  For all New Consults and Questions:  [email or web address]   Login: [place]

## 2019-11-14 NOTE — ED PROVIDER NOTE - CLINICAL SUMMARY MEDICAL DECISION MAKING FREE TEXT BOX
63F hx CABG 10/26 for inf wall stemi, p/w chest pain, concern for acs/ graft occlusion, st changes inf leads on ecg without reciprocal changes, will c/s cards/ct surg, asa, cxr 63F hx CABG 10/26 for inf wall stemi, p/w chest pain, concern for acs/ graft occlusion, st changes inf leads on ecg without reciprocal changes, will c/s cards/ct surg, asa, cxr  marine morales cabg, atri clip and mitral valve dc from hospital last week  now with acute cp since yesterday site appears cdi, pos cheat wall ttp - pocus to eval for effusion , ekg changed from pre mi -- will cath consult - give asa - ct surg consult and franky admit

## 2019-11-14 NOTE — H&P ADULT - NSHPPHYSICALEXAM_GEN_ALL_CORE
Vital Signs Last 24 Hrs  T(C): 36.7 (14 Nov 2019 14:57), Max: 37 (14 Nov 2019 07:53)  T(F): 98 (14 Nov 2019 14:57), Max: 98.6 (14 Nov 2019 07:53)  HR: 87 (14 Nov 2019 14:57) (87 - 103)  BP: 117/75 (14 Nov 2019 14:57) (111/73 - 130/59)  RR: 19 (14 Nov 2019 14:57) (17 - 19)  SpO2: 97% (14 Nov 2019 14:57) (97% - 97%) RA      PHYSICAL EXAM  Neurology: A&Ox3, nonfocal, no gross deficits  CV : RRR+S1S2  Sternal Wound: MSI CDI CARMELINA, Stable  Lungs: Respirations non-labored, B/L BS CTA  Abdomen: Soft, NT/ND, +BSx4Q, last BM 11/13  (-)N/V/D  : Voiding without difficulty  Extremities: B/L LE no edema, negative calf tenderness, +PP, RLE SVG and Left radial incision CDI CARMELINA

## 2019-11-14 NOTE — ED ADULT NURSE NOTE - OBJECTIVE STATEMENT
Pt presents to the ED with complaint of chest pain, AXOX3, daughter at bedside, reports sudden onset of sharp, nonradiating chest pain, hx of  DM, HLD, recent CABGX 3, surgical incision well approximated, no drainage, peripheral pulses present, extremities warm to touch,  pt denies fevers or chills, breathing unlabored, pt reports mild shortness of breath, lung sounds diminished, pt reports mild intermittent nausea, pt daughter states this is chronic, abdomen soft and nontender, no vomiting or diarrhea, no dysuria or hematuria.

## 2019-11-14 NOTE — ED PROVIDER NOTE - NS ED ROS FT
Gen: No fever  Eyes: No eye irritation or discharge  ENT: No ear pain, congestion, sore throat  Resp: No cough, + mild dyspnea  Cardiovascular: +chest pain   Gastroenteric: No nausea/vomiting, diarrhea, constipation  :  No change in urine output; no dysuria  MS: No joint or muscle pain  Skin: No rashes  Neuro: No headache; no abnormal movements  Remainder negative, except as per the HPI

## 2019-11-15 ENCOUNTER — TRANSCRIPTION ENCOUNTER (OUTPATIENT)
Age: 64
End: 2019-11-15

## 2019-11-15 VITALS
HEART RATE: 90 BPM | RESPIRATION RATE: 18 BRPM | OXYGEN SATURATION: 98 % | DIASTOLIC BLOOD PRESSURE: 80 MMHG | SYSTOLIC BLOOD PRESSURE: 153 MMHG | TEMPERATURE: 98 F

## 2019-11-15 LAB
ALBUMIN SERPL ELPH-MCNC: 3.8 G/DL — SIGNIFICANT CHANGE UP (ref 3.3–5)
ALP SERPL-CCNC: 113 U/L — SIGNIFICANT CHANGE UP (ref 40–120)
ALT FLD-CCNC: 21 U/L — SIGNIFICANT CHANGE UP (ref 10–45)
ANION GAP SERPL CALC-SCNC: 12 MMOL/L — SIGNIFICANT CHANGE UP (ref 5–17)
AST SERPL-CCNC: 10 U/L — SIGNIFICANT CHANGE UP (ref 10–40)
BILIRUB SERPL-MCNC: 0.5 MG/DL — SIGNIFICANT CHANGE UP (ref 0.2–1.2)
BUN SERPL-MCNC: 59 MG/DL — HIGH (ref 7–23)
CALCIUM SERPL-MCNC: 9.8 MG/DL — SIGNIFICANT CHANGE UP (ref 8.4–10.5)
CHLORIDE SERPL-SCNC: 99 MMOL/L — SIGNIFICANT CHANGE UP (ref 96–108)
CO2 SERPL-SCNC: 24 MMOL/L — SIGNIFICANT CHANGE UP (ref 22–31)
CREAT SERPL-MCNC: 1.08 MG/DL — SIGNIFICANT CHANGE UP (ref 0.5–1.3)
GLUCOSE BLDC GLUCOMTR-MCNC: 115 MG/DL — HIGH (ref 70–99)
GLUCOSE BLDC GLUCOMTR-MCNC: 150 MG/DL — HIGH (ref 70–99)
GLUCOSE SERPL-MCNC: 110 MG/DL — HIGH (ref 70–99)
HCT VFR BLD CALC: 37.1 % — SIGNIFICANT CHANGE UP (ref 34.5–45)
HGB BLD-MCNC: 12.2 G/DL — SIGNIFICANT CHANGE UP (ref 11.5–15.5)
INR BLD: 1.29 RATIO — HIGH (ref 0.88–1.16)
MCHC RBC-ENTMCNC: 26.9 PG — LOW (ref 27–34)
MCHC RBC-ENTMCNC: 32.9 GM/DL — SIGNIFICANT CHANGE UP (ref 32–36)
MCV RBC AUTO: 81.7 FL — SIGNIFICANT CHANGE UP (ref 80–100)
NRBC # BLD: 0 /100 WBCS — SIGNIFICANT CHANGE UP (ref 0–0)
PLATELET # BLD AUTO: 485 K/UL — HIGH (ref 150–400)
POTASSIUM SERPL-MCNC: 4.4 MMOL/L — SIGNIFICANT CHANGE UP (ref 3.5–5.3)
POTASSIUM SERPL-SCNC: 4.4 MMOL/L — SIGNIFICANT CHANGE UP (ref 3.5–5.3)
PROT SERPL-MCNC: 7.9 G/DL — SIGNIFICANT CHANGE UP (ref 6–8.3)
PROTHROM AB SERPL-ACNC: 15 SEC — HIGH (ref 10–12.9)
RBC # BLD: 4.54 M/UL — SIGNIFICANT CHANGE UP (ref 3.8–5.2)
RBC # FLD: 15.7 % — HIGH (ref 10.3–14.5)
SODIUM SERPL-SCNC: 135 MMOL/L — SIGNIFICANT CHANGE UP (ref 135–145)
WBC # BLD: 8.88 K/UL — SIGNIFICANT CHANGE UP (ref 3.8–10.5)
WBC # FLD AUTO: 8.88 K/UL — SIGNIFICANT CHANGE UP (ref 3.8–10.5)

## 2019-11-15 PROCEDURE — 93005 ELECTROCARDIOGRAM TRACING: CPT | Mod: 76

## 2019-11-15 PROCEDURE — 85730 THROMBOPLASTIN TIME PARTIAL: CPT

## 2019-11-15 PROCEDURE — 71045 X-RAY EXAM CHEST 1 VIEW: CPT

## 2019-11-15 PROCEDURE — 99285 EMERGENCY DEPT VISIT HI MDM: CPT | Mod: 25

## 2019-11-15 PROCEDURE — G0378: CPT

## 2019-11-15 PROCEDURE — 85610 PROTHROMBIN TIME: CPT

## 2019-11-15 PROCEDURE — 80053 COMPREHEN METABOLIC PANEL: CPT

## 2019-11-15 PROCEDURE — 82550 ASSAY OF CK (CPK): CPT

## 2019-11-15 PROCEDURE — 84484 ASSAY OF TROPONIN QUANT: CPT

## 2019-11-15 PROCEDURE — 93308 TTE F-UP OR LMTD: CPT

## 2019-11-15 PROCEDURE — 85652 RBC SED RATE AUTOMATED: CPT

## 2019-11-15 PROCEDURE — 93306 TTE W/DOPPLER COMPLETE: CPT

## 2019-11-15 PROCEDURE — 99024 POSTOP FOLLOW-UP VISIT: CPT

## 2019-11-15 PROCEDURE — 83735 ASSAY OF MAGNESIUM: CPT

## 2019-11-15 PROCEDURE — 86140 C-REACTIVE PROTEIN: CPT

## 2019-11-15 PROCEDURE — 82962 GLUCOSE BLOOD TEST: CPT

## 2019-11-15 PROCEDURE — 82553 CREATINE MB FRACTION: CPT

## 2019-11-15 PROCEDURE — 85027 COMPLETE CBC AUTOMATED: CPT

## 2019-11-15 PROCEDURE — 83690 ASSAY OF LIPASE: CPT

## 2019-11-15 RX ORDER — LISINOPRIL 5 MG/1
5 TABLET ORAL DAILY
Qty: 30 | Refills: 3 | Status: ACTIVE | COMMUNITY
Start: 2019-11-15

## 2019-11-15 RX ORDER — FOLIC ACID 1 MG/1
1 TABLET ORAL
Qty: 30 | Refills: 3 | Status: ACTIVE | COMMUNITY
Start: 2019-11-15

## 2019-11-15 RX ORDER — ATORVASTATIN CALCIUM 80 MG/1
80 TABLET, FILM COATED ORAL
Qty: 30 | Refills: 0 | Status: ACTIVE | COMMUNITY
Start: 2019-11-15

## 2019-11-15 RX ORDER — ASPIRIN 81 MG/1
81 TABLET ORAL
Qty: 30 | Refills: 3 | Status: ACTIVE | COMMUNITY
Start: 2019-11-15

## 2019-11-15 RX ORDER — WARFARIN SODIUM 2.5 MG/1
1 TABLET ORAL
Qty: 30 | Refills: 0
Start: 2019-11-15 | End: 2019-12-14

## 2019-11-15 RX ORDER — CHLORHEXIDINE GLUCONATE 4 %
325 (65 FE) LIQUID (ML) TOPICAL DAILY
Refills: 0 | Status: ACTIVE | COMMUNITY
Start: 2019-11-15

## 2019-11-15 RX ORDER — ACETAMINOPHEN 500 MG
2 TABLET ORAL
Qty: 0 | Refills: 0 | DISCHARGE
Start: 2019-11-15

## 2019-11-15 RX ORDER — ACETAMINOPHEN 500 MG
650 TABLET ORAL ONCE
Refills: 0 | Status: COMPLETED | OUTPATIENT
Start: 2019-11-15 | End: 2019-11-15

## 2019-11-15 RX ORDER — ASCORBIC ACID 500 MG
500 TABLET ORAL DAILY
Refills: 0 | Status: ACTIVE | COMMUNITY
Start: 2019-11-15

## 2019-11-15 RX ORDER — METOPROLOL SUCCINATE 100 MG/1
100 TABLET, EXTENDED RELEASE ORAL DAILY
Qty: 30 | Refills: 2 | Status: ACTIVE | COMMUNITY
Start: 2019-11-15

## 2019-11-15 RX ADMIN — Medication 12 UNIT(S): at 08:18

## 2019-11-15 RX ADMIN — PANTOPRAZOLE SODIUM 40 MILLIGRAM(S): 20 TABLET, DELAYED RELEASE ORAL at 05:11

## 2019-11-15 RX ADMIN — LISINOPRIL 5 MILLIGRAM(S): 2.5 TABLET ORAL at 05:11

## 2019-11-15 RX ADMIN — Medication 100 MILLIGRAM(S): at 05:11

## 2019-11-15 RX ADMIN — Medication 81 MILLIGRAM(S): at 11:56

## 2019-11-15 RX ADMIN — Medication 650 MILLIGRAM(S): at 05:25

## 2019-11-15 RX ADMIN — Medication 12 UNIT(S): at 12:41

## 2019-11-15 RX ADMIN — Medication 650 MILLIGRAM(S): at 11:56

## 2019-11-15 RX ADMIN — Medication 650 MILLIGRAM(S): at 12:45

## 2019-11-15 NOTE — DISCHARGE NOTE PROVIDER - NSDCFUSCHEDAPPT_GEN_ALL_CORE_FT
AICHA BILLY ; 11/18/2019 ; NPP CT Surg 300 Comm AICHA Myers ; 11/22/2019 ; NPP Cardio 300 Comm.  AICHA BILLY ; 11/22/2019 ; NPP Cardio 300 Comm. AICHA Myers ; 11/25/2019 ; NPP CT Surg 300 Comm

## 2019-11-15 NOTE — DISCHARGE NOTE PROVIDER - NSDCACTIVITY_GEN_ALL_CORE
Showering allowed/Do not make important decisions/Stairs allowed/Walking - Indoors allowed/Walking - Outdoors allowed/No heavy lifting/straining/Do not drive or operate machinery

## 2019-11-15 NOTE — DISCHARGE NOTE PROVIDER - HOSPITAL COURSE
62 y/o female with PMHx of DM2 on insulin, HTN, and CVA (requiring cane to ambulate, but no focal deficits), CAD/NSTEMI s/p 10/25 JARED Clipping, MVR tissue 31mm valve, CABG x 4 LIMA now presenting to [facility] ED with c/o midsternal non-radiating chest pain a/w mild dyspnea, acute onset approx 7am this morning. Family immediately brought pt into ED, pain has since resolved after taking Tylenol. EKG w/o ischemic changes, CXR - clear lungs, VSS. Pt is stable for discharge home from CT Surgery standpoint and instructed to get INR checked Saturday and f/u in our office on Monday with Dr. Momin. ED and cardiology feels pt requires admission to be observed for further CP and trending troponins.        11/15 HD stable, no chest pain, cleared for discharge per CTS.     Continue current medical regimen    Ambulate frequently and as tolerated    Continue coumadin daily        F/U appointment with Dr. Momin on Monday 11/18 at 11:15am.     Pt given prescription to check PT/INR Saturday 11/16 and have results faxed to PCP Dr. Jones for Coumadin management    Spoke w/ daughter, pt has prescriptions at home. Will follow up at office on Monday. 62 y/o female with PMHx of DM2 on insulin, HTN, and CVA (requiring cane to ambulate, but no focal deficits), CAD/NSTEMI s/p 10/25 JARED Clipping, MVR tissue 31mm valve, CABG x 4 LIMA now presenting to [facility] ED with c/o midsternal non-radiating chest pain a/w mild dyspnea, acute onset approx 7am this morning. Family immediately brought pt into ED, pain has since resolved after taking Tylenol. EKG w/o ischemic changes, CXR - clear lungs, VSS. Pt is stable for discharge home from CT Surgery standpoint and instructed to get INR checked Saturday and f/u in our office on Monday with Dr. Momin. ED and cardiology feels pt requires admission to be observed for further CP and trending troponins.        11/15 HD stable, no chest pain, cleared for discharge per CTS.     Continue current medical regimen    Ambulate frequently and as tolerated    Continue coumadin daily        F/U appointment with Dr. Momin on Monday 11/25 at 0830 am.     Pt given prescription to check PT/INR Saturday 11/16 and have results faxed to PCP Dr. Jones for Coumadin management    Spoke w/ daughter, pt has prescriptions at home. Will follow up at office on Monday.

## 2019-11-15 NOTE — DISCHARGE NOTE NURSING/CASE MANAGEMENT/SOCIAL WORK - PATIENT PORTAL LINK FT
You can access the FollowMyHealth Patient Portal offered by [facility] by registering at the following website: [email or web address]. By joining [facility]’s FollowMyHealth portal, you will also be able to view your health information using other applications (apps) compatible with our system.

## 2019-11-15 NOTE — DISCHARGE NOTE PROVIDER - CARE PROVIDER_API CALL
Krishna Momin)  Surgery; Thoracic and Cardiac Surgery  [address] [place] [address]  Phone: [phone]  Fax: [phone]  Follow Up Time:

## 2019-11-15 NOTE — DISCHARGE NOTE PROVIDER - NSDCMRMEDTOKEN_GEN_ALL_CORE_FT
acetaminophen 325 mg oral tablet: 2 tab(s) orally every 6 hours, As needed, Temp greater or equal to 38C (100.4F), Mild Pain (1 - 3)  Admelog SoloStar 100 units/mL injectable solution: 12 unit(s) subcutaneous 3 times a day (before meals)   alcohol swabs : Apply topically to affected area 4 times a day   Aspir 81 oral delayed release tablet: 1 tab(s) orally once a day  atorvastatin 80 mg oral tablet: 1 tab(s) orally once a day (at bedtime)  Basaglar KwikPen 100 units/mL subcutaneous solution: 24 unit(s) subcutaneous once a day (at bedtime)   BD Ultrathin Pen Needle : 1 application subcutaneous 4 times a day (before meals and at bedtime)   Coumadin 4 mg oral tablet: 1 tab(s) orally once a day  ferrous sulfate 325 mg (65 mg elemental iron) oral delayed release tablet: 1 tab(s) orally 3 times a day   folic acid 1 mg oral tablet: 1 tab(s) orally once a day   furosemide 40 mg oral tablet: 1 tab(s) orally once a day  lancets: 1 application subcutaneously 4 times a day before meals and at bedtime   lisinopril 5 mg oral tablet: 1 tab(s) orally once a day  metoprolol succinate 100 mg oral tablet, extended release: 1 tab(s) orally once a day  One touch Glucometer Kit : 1 kit subcutaneous 4 times a day (before meals and at bedtime)   One touch test strips  : 4 application subcutaneous 4 times a day (before meals and at bedtime)   pantoprazole 40 mg oral delayed release tablet: 1 tab(s) orally once a day (before a meal)  polyethylene glycol 3350 oral powder for reconstitution: 17 gram(s) orally once a day, As Needed -for constipation   PT/INR STAT on Saturday 11/16  : PT/INR STAT on Saturday 11/16/19  Follow up with Dr. Jones for couamdin dosing (Fax)  [phone]  spironolactone 25 mg oral tablet: 1 tab(s) orally once a day  Vitamin C 500 mg oral tablet: 1 tab(s) orally once a day acetaminophen 325 mg oral tablet: 2 tab(s) orally every 6 hours, As needed, Temp greater or equal to 38C (100.4F), Mild Pain (1 - 3)  Admelog SoloStar 100 units/mL injectable solution: 12 unit(s) subcutaneous 3 times a day (before meals)   alcohol swabs : Apply topically to affected area 4 times a day   Aspir 81 oral delayed release tablet: 1 tab(s) orally once a day  atorvastatin 80 mg oral tablet: 1 tab(s) orally once a day (at bedtime)  Basaglar KwikPen 100 units/mL subcutaneous solution: 24 unit(s) subcutaneous once a day (at bedtime)   BD Ultrathin Pen Needle : 1 application subcutaneous 4 times a day (before meals and at bedtime)   Coumadin 4 mg oral tablet: 1 tab(s) orally once a day  ferrous sulfate 325 mg (65 mg elemental iron) oral delayed release tablet: 1 tab(s) orally 3 times a day   folic acid 1 mg oral tablet: 1 tab(s) orally once a day   furosemide 40 mg oral tablet: 1 tab(s) orally once a day  lancets: 1 application subcutaneously 4 times a day before meals and at bedtime   lisinopril 5 mg oral tablet: 1 tab(s) orally once a day  metoprolol succinate 100 mg oral tablet, extended release: 1 tab(s) orally once a day  One touch Glucometer Kit : 1 kit subcutaneous 4 times a day (before meals and at bedtime)   One touch test strips  : 4 application subcutaneous 4 times a day (before meals and at bedtime)   pantoprazole 40 mg oral delayed release tablet: 1 tab(s) orally once a day (before a meal)  polyethylene glycol 3350 oral powder for reconstitution: 17 gram(s) orally once a day, As Needed -for constipation   PT/INR STAT on Saturday 11/16  : PT/INR STAT on Saturday 11/16/19  Follow up with Dr. Jones for couamdin dosing (Fax)  [phone]  spironolactone 25 mg oral tablet: 1 tab(s) orally once a day  Vitamin C 500 mg oral tablet: 1 tab(s) orally once a day

## 2019-11-15 NOTE — DISCHARGE NOTE PROVIDER - NSDCFUADDAPPT_GEN_ALL_CORE_FT
Follow Do's and Don'ts of cardiac surgery instruction packet for care anf activity at home.   Continue to take your prescribed medications as directed.   Pt given prescription to check PT/INR Saturday 11/16 and have results faxed to PCP Dr. Jones for Coumadin management  F/U appointment with Dr. Momin on Monday 11/18 at 11:15am.   Follow up w/ your primary care doctor and your cardiologist in 2 weeks, call to schedule an appointment. Follow Do's and Don'ts of cardiac surgery instruction packet for care anf activity at home.   Continue to take your prescribed medications as directed.   Pt given prescription to check PT/INR Saturday 11/16 and have results faxed to PCP Dr. Jones for Coumadin management  F/U appointment with Dr. Momin on Monday 11/25 at 0830 am.   Follow up w/ your primary care doctor and your cardiologist in 2 weeks, call to schedule an appointment.

## 2019-11-15 NOTE — DISCHARGE NOTE NURSING/CASE MANAGEMENT/SOCIAL WORK - NSSCTYPOFSERV_GEN_ALL_CORE
Registered Nurse will contact you to arrange initial visit.  Physical Therapy, Home Health Aide, and Social Work to follow
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
## 2019-11-15 NOTE — DISCHARGE NOTE NURSING/CASE MANAGEMENT/SOCIAL WORK - NSTRANSFERDENTURES_GEN_A_NUR
December 11, 2018      To Whom It May Concern:      Bri Daniels was seen in our Emergency Department today, 12/11/18.  He should not use right arm until seen in follow-up.  It is recommended he follow up in 5 days.        Sincerely,        Kiran Yusuf MD        
upper/full

## 2019-11-18 ENCOUNTER — APPOINTMENT (OUTPATIENT)
Dept: CARDIOTHORACIC SURGERY | Facility: CLINIC | Age: 64
End: 2019-11-18
Payer: MEDICAID

## 2019-11-18 DIAGNOSIS — Z86.73 PERSONAL HISTORY OF TRANSIENT ISCHEMIC ATTACK (TIA), AND CEREBRAL INFARCTION W/OUT RESIDUAL DEFICITS: ICD-10-CM

## 2019-11-18 DIAGNOSIS — I21.4 NON-ST ELEVATION (NSTEMI) MYOCARDIAL INFARCTION: ICD-10-CM

## 2019-11-19 DIAGNOSIS — Z76.89 PERSONS ENCOUNTERING HEALTH SERVICES IN OTHER SPECIFIED CIRCUMSTANCES: ICD-10-CM

## 2019-11-22 PROBLEM — Z95.1 S/P CABG X 4: Status: ACTIVE | Noted: 2019-11-15

## 2019-11-22 PROBLEM — Z09 POSTOP CHECK: Status: ACTIVE | Noted: 2019-11-15

## 2019-11-25 ENCOUNTER — APPOINTMENT (OUTPATIENT)
Dept: CARDIOTHORACIC SURGERY | Facility: CLINIC | Age: 64
End: 2019-11-25
Payer: MEDICAID

## 2019-11-25 VITALS
OXYGEN SATURATION: 97 % | DIASTOLIC BLOOD PRESSURE: 73 MMHG | HEART RATE: 99 BPM | BODY MASS INDEX: 22.53 KG/M2 | TEMPERATURE: 98.2 F | WEIGHT: 132 LBS | RESPIRATION RATE: 16 BRPM | HEIGHT: 64 IN | SYSTOLIC BLOOD PRESSURE: 108 MMHG

## 2019-11-25 DIAGNOSIS — Z09 ENCOUNTER FOR FOLLOW-UP EXAMINATION AFTER COMPLETED TREATMENT FOR CONDITIONS OTHER THAN MALIGNANT NEOPLASM: ICD-10-CM

## 2019-11-25 DIAGNOSIS — Z00.00 ENCOUNTER FOR GENERAL ADULT MEDICAL EXAMINATION W/OUT ABNORMAL FINDINGS: ICD-10-CM

## 2019-11-25 DIAGNOSIS — E11.9 TYPE 2 DIABETES MELLITUS W/OUT COMPLICATIONS: ICD-10-CM

## 2019-11-25 DIAGNOSIS — I10 ESSENTIAL (PRIMARY) HYPERTENSION: ICD-10-CM

## 2019-11-25 DIAGNOSIS — Z95.1 PRESENCE OF AORTOCORONARY BYPASS GRAFT: ICD-10-CM

## 2019-11-25 PROCEDURE — 99024 POSTOP FOLLOW-UP VISIT: CPT

## 2019-11-25 RX ORDER — SPIRONOLACTONE 25 MG/1
25 TABLET ORAL DAILY
Qty: 7 | Refills: 0 | Status: COMPLETED | COMMUNITY
Start: 2019-11-15 | End: 2019-11-25

## 2019-11-25 RX ORDER — FUROSEMIDE 40 MG/1
40 TABLET ORAL
Qty: 5 | Refills: 0 | Status: COMPLETED | COMMUNITY
Start: 2019-11-15 | End: 2019-11-25

## 2019-12-01 ENCOUNTER — OUTPATIENT (OUTPATIENT)
Dept: OUTPATIENT SERVICES | Facility: HOSPITAL | Age: 64
LOS: 1 days | End: 2019-12-01
Payer: MEDICARE

## 2019-12-01 DIAGNOSIS — Z95.1 PRESENCE OF AORTOCORONARY BYPASS GRAFT: Chronic | ICD-10-CM

## 2019-12-01 PROCEDURE — G9005: CPT

## 2019-12-01 PROCEDURE — G9001: CPT

## 2019-12-06 ENCOUNTER — APPOINTMENT (OUTPATIENT)
Dept: CARDIOLOGY | Facility: CLINIC | Age: 64
End: 2019-12-06

## 2019-12-09 ENCOUNTER — APPOINTMENT (OUTPATIENT)
Dept: CARDIOTHORACIC SURGERY | Facility: CLINIC | Age: 64
End: 2019-12-09

## 2019-12-17 NOTE — CHART NOTE - NSCHARTNOTEFT_GEN_A_CORE
Removal of Femoral Sheath    Pulses in the (right) lower extremity are (palpable). The patient was placed in the supine position. The insertion site was identified and the sutures were removed per protocol.  The 7V/5A__ Kinyarwanda femoral sheath was then removed. Direct pressure was applied for  __30____ minutes.     Monitoring of the (right) groin and both lower extremities including neuro-vascular checks and vital signs every 15 minutes x 4, then every 30 minutes x 2, then every 1 hour was ordered.    Complications: None    Comments: No ecchymosis or Hematoma, toes pink warm and mobile, WALLS. Normal for race

## 2020-01-01 ENCOUNTER — OUTPATIENT (OUTPATIENT)
Dept: OUTPATIENT SERVICES | Facility: HOSPITAL | Age: 65
LOS: 1 days | End: 2020-01-01
Payer: MEDICARE

## 2020-01-01 DIAGNOSIS — Z95.1 PRESENCE OF AORTOCORONARY BYPASS GRAFT: Chronic | ICD-10-CM

## 2020-01-27 DIAGNOSIS — Z71.89 OTHER SPECIFIED COUNSELING: ICD-10-CM

## 2020-01-31 ENCOUNTER — APPOINTMENT (OUTPATIENT)
Dept: CARDIOLOGY | Facility: CLINIC | Age: 65
End: 2020-01-31
Payer: MEDICARE

## 2020-01-31 VITALS
HEART RATE: 101 BPM | OXYGEN SATURATION: 100 % | HEIGHT: 64 IN | DIASTOLIC BLOOD PRESSURE: 87 MMHG | SYSTOLIC BLOOD PRESSURE: 141 MMHG

## 2020-01-31 DIAGNOSIS — I25.811 ATHEROSCLEROSIS OF NATIVE CORONARY ARTERY OF TRANSPLANTED HEART W/OUT ANGINA PECTORIS: ICD-10-CM

## 2020-01-31 DIAGNOSIS — I25.10 ATHEROSCLEROTIC HEART DISEASE OF NATIVE CORONARY ARTERY W/OUT ANGINA PECTORIS: ICD-10-CM

## 2020-01-31 PROCEDURE — 93000 ELECTROCARDIOGRAM COMPLETE: CPT

## 2020-01-31 PROCEDURE — 99213 OFFICE O/P EST LOW 20 MIN: CPT

## 2020-01-31 RX ORDER — LORATADINE 10 MG
17 TABLET,DISINTEGRATING ORAL DAILY
Qty: 1 | Refills: 1 | Status: DISCONTINUED | COMMUNITY
Start: 2019-11-15 | End: 2020-01-31

## 2020-01-31 RX ORDER — PANTOPRAZOLE 40 MG/1
40 TABLET, DELAYED RELEASE ORAL DAILY
Qty: 30 | Refills: 1 | Status: DISCONTINUED | COMMUNITY
Start: 2019-11-15 | End: 2020-01-31

## 2020-01-31 RX ORDER — INSULIN LISPRO 100 U/ML
100 INJECTION, SOLUTION SUBCUTANEOUS 3 TIMES DAILY
Qty: 1 | Refills: 2 | Status: DISCONTINUED | COMMUNITY
Start: 2019-11-15 | End: 2020-01-31

## 2020-01-31 RX ORDER — INSULIN GLARGINE 100 [IU]/ML
100 INJECTION, SOLUTION SUBCUTANEOUS
Qty: 3 | Refills: 0 | Status: DISCONTINUED | COMMUNITY
Start: 2019-11-15 | End: 2020-01-31

## 2020-02-06 PROBLEM — I25.10 CORONARY ARTERY DISEASE: Status: ACTIVE | Noted: 2020-02-06

## 2020-02-06 PROBLEM — I25.811 CORONARY ATHEROSCLEROSIS OF NATIVE ARTERY OF TRANSPLANTED HEART: Status: ACTIVE | Noted: 2020-02-06

## 2020-02-06 NOTE — REASON FOR VISIT
[Initial Evaluation] : an initial evaluation of [Coronary Artery Disease] : coronary artery disease [Family Member] : family member [CABG Follow-up] : bypass graft

## 2020-02-13 NOTE — PHYSICAL EXAM
[General Appearance - Well Developed] : well developed [Well Groomed] : well groomed [Normal Appearance] : normal appearance [General Appearance - Well Nourished] : well nourished [No Deformities] : no deformities [General Appearance - In No Acute Distress] : no acute distress [Eyelids - No Xanthelasma] : the eyelids demonstrated no xanthelasmas [Normal Conjunctiva] : the conjunctiva exhibited no abnormalities [Normal Oral Mucosa] : normal oral mucosa [No Oral Pallor] : no oral pallor [No Oral Cyanosis] : no oral cyanosis [Normal Jugular Venous A Waves Present] : normal jugular venous A waves present [Normal Jugular Venous V Waves Present] : normal jugular venous V waves present [No Jugular Venous Gunter A Waves] : no jugular venous gunter A waves [Heart Rate And Rhythm] : heart rate and rhythm were normal [Heart Sounds] : normal S1 and S2 [Edema] : no peripheral edema present [Murmurs] : no murmurs present [Exaggerated Use Of Accessory Muscles For Inspiration] : no accessory muscle use [Respiration, Rhythm And Depth] : normal respiratory rhythm and effort [Abdomen Soft] : soft [Auscultation Breath Sounds / Voice Sounds] : lungs were clear to auscultation bilaterally [Abdomen Tenderness] : non-tender [Abdomen Mass (___ Cm)] : no abdominal mass palpated [Nail Clubbing] : no clubbing of the fingernails [Cyanosis, Localized] : no localized cyanosis [Petechial Hemorrhages (___cm)] : no petechial hemorrhages [] : no rash [Skin Color & Pigmentation] : normal skin color and pigmentation [No Venous Stasis] : no venous stasis [No Skin Ulcers] : no skin ulcer [Skin Lesions] : no skin lesions [No Xanthoma] : no  xanthoma was observed [Oriented To Time, Place, And Person] : oriented to person, place, and time [Affect] : the affect was normal [Mood] : the mood was normal [No Anxiety] : not feeling anxious [FreeTextEntry1] : Rt LE plegia , Ambulates with cane

## 2020-02-13 NOTE — ASSESSMENT
[Coronary Atherosclerosis of Native Coronary Artery (414.01\I25.10)] : total [FreeTextEntry1] : Ms. AICHA BILLY  is a 64 year-old woman PMH CAD s/p NSTEMI s/p CABG x4 ( LIMA to Circumflex, DELIA to LAD, Left radial graft to PDA, SVG to posterolateral branch of RCA ) , Mitral valve Replacement using 31 mm Epic porcine bio prosthesis, Exclusion of JARED with atriclip on 10/25/19.DM ty 2 on insulin, HTN, CVA with Rt LE plegia requiring cane use,  Possbile hx of Afib as she is on coumadin, mild ischemic CM EF 53%\par \par Since leaving the hospital she has been doing well  and has returned to her baseline activities without symptoms of CP or SOB,\par Her BP is controlled. EKG is unchanged\par her ECHO prior to discharge demonstrated mild LV dysfxn with akinesis of the basal inferior wall and nml gradients across her bioprosthetic MV\par She has her INR checked by her PCP and has not had therapeutic INRs but has been adjusting her Coumadin as directed\par \par Will refill her home medications, and have her fu in 3 months\par she is not interested in cardiac rehab at this time\par \par \par \par Will

## 2020-02-13 NOTE — HISTORY OF PRESENT ILLNESS
[FreeTextEntry1] : Ms. AICHA BILLY  is a 64 year-old woman PMH CAD s/p NSTEMI s/p CABG x4 ( LIMA to Circumflex, DELIA to LAD, Left radial graft to PDA, SVG to posterolateral branch of RCA ) , Mitral valve Replacement using 31 mm Epic porcine bio prosthesis, Exclusion of JARED with atriclip on 10/25/19.DM ty 2 on insulin, HTN, CVA with Rt LE plegia requiring cane use,  Possbile hx of Afib\par Since leaving the hospital she has been doing well  and has returned to her baseline activities without symptoms of CP or SOB,\par She has her INR checked by her PCP and has not had therapeutic INRs but has been adjusting her Coumadin as directed\par

## 2020-03-10 DIAGNOSIS — Z71.89 OTHER SPECIFIED COUNSELING: ICD-10-CM

## 2020-03-17 NOTE — DISCHARGE NOTE PROVIDER - NSDCCAREPROVSEEN_GEN_ALL_CORE_FT
Message left on pharmacy voicemail indicating Paolo Landeros has been approved. Monthly qty 30 per 10 days.  Granted date 03/16/2020-06/16/2020
Prior authorization for [employer] completed w/ [employer] on cover my meds Key: [id number], turn around time 3-5 days.
Waiting for Auth Details     Contact the payer to obtain prior authorization.                    Medication Being Authorized      HYDROcodone-acetaminophen 5-325
Krisnha Momin

## 2020-04-01 PROCEDURE — G9005: CPT

## 2020-08-21 ENCOUNTER — APPOINTMENT (OUTPATIENT)
Dept: CARDIOLOGY | Facility: CLINIC | Age: 65
End: 2020-08-21
Payer: MEDICAID

## 2020-08-21 VITALS
DIASTOLIC BLOOD PRESSURE: 78 MMHG | HEART RATE: 62 BPM | OXYGEN SATURATION: 100 % | HEIGHT: 64 IN | SYSTOLIC BLOOD PRESSURE: 152 MMHG

## 2020-08-21 VITALS — DIASTOLIC BLOOD PRESSURE: 90 MMHG | SYSTOLIC BLOOD PRESSURE: 120 MMHG

## 2020-08-21 DIAGNOSIS — Z95.2 PRESENCE OF PROSTHETIC HEART VALVE: ICD-10-CM

## 2020-08-21 PROCEDURE — 93000 ELECTROCARDIOGRAM COMPLETE: CPT

## 2020-08-21 PROCEDURE — 99215 OFFICE O/P EST HI 40 MIN: CPT

## 2020-08-21 RX ORDER — AMOXICILLIN 500 MG/1
500 CAPSULE ORAL
Qty: 4 | Refills: 2 | Status: COMPLETED | COMMUNITY
Start: 2020-08-21

## 2020-08-21 RX ORDER — WARFARIN SODIUM 2 MG/1
2 TABLET ORAL DAILY
Qty: 30 | Refills: 0 | Status: DISCONTINUED | COMMUNITY
Start: 2019-11-15 | End: 2020-08-21

## 2020-08-21 NOTE — ASSESSMENT
[FreeTextEntry1] : Ms. AICHA BILLY  is a 64 year-old woman PMH CAD s/p NSTEMI s/p CABG x4 ( LIMA to Circumflex, DELIA to LAD, Left radial graft to PDA, SVG to posterolateral branch of RCA ) , Mitral valve Replacement using 31 mm Epic porcine bio prosthesis, Exclusion of JARED with Mitraclip on 10/25/19.DM ty 2 on insulin, HTN, CVA with Rt LE plegia requiring cane use,  Possbile hx of Afib as she is on coumadin, mild ischemic CM EF 53%\par \par Since leaving the hospital she has been doing well  and has returned to her baseline activities without symptoms of CP or SOB,\par Her BP is controlled. EKG is unchanged\par her ECHO prior to discharge demonstrated mild LV dysfxn with akinesis of the basal inferior wall and nml gradients across her bioprosthetic MV\par She is in NSR, coumadin stopped by her PCP, coumadin likely for recent prosthetic MV, will clarify with her PCP, no recorded Afib and JARED is clipped\par Discussed and gave handout for abx ppx for dental and resp procedures, placed a PRN order for 2gm amoxicillian PRN that can be refilled as needed for dental\par \par \par \par Will fu in 6-8M

## 2020-08-21 NOTE — PHYSICAL EXAM
[General Appearance - Well Developed] : well developed [Normal Appearance] : normal appearance [Well Groomed] : well groomed [General Appearance - Well Nourished] : well nourished [No Deformities] : no deformities [General Appearance - In No Acute Distress] : no acute distress [Normal Conjunctiva] : the conjunctiva exhibited no abnormalities [Eyelids - No Xanthelasma] : the eyelids demonstrated no xanthelasmas [No Oral Pallor] : no oral pallor [Normal Oral Mucosa] : normal oral mucosa [Normal Jugular Venous V Waves Present] : normal jugular venous V waves present [Normal Jugular Venous A Waves Present] : normal jugular venous A waves present [No Oral Cyanosis] : no oral cyanosis [Respiration, Rhythm And Depth] : normal respiratory rhythm and effort [No Jugular Venous Gunter A Waves] : no jugular venous gunter A waves [Auscultation Breath Sounds / Voice Sounds] : lungs were clear to auscultation bilaterally [Exaggerated Use Of Accessory Muscles For Inspiration] : no accessory muscle use [Heart Sounds] : normal S1 and S2 [Heart Rate And Rhythm] : heart rate and rhythm were normal [Edema] : no peripheral edema present [Murmurs] : no murmurs present [Abdomen Soft] : soft [Abdomen Tenderness] : non-tender [Abdomen Mass (___ Cm)] : no abdominal mass palpated [Nail Clubbing] : no clubbing of the fingernails [Petechial Hemorrhages (___cm)] : no petechial hemorrhages [Cyanosis, Localized] : no localized cyanosis [Skin Color & Pigmentation] : normal skin color and pigmentation [] : no rash [No Venous Stasis] : no venous stasis [Skin Lesions] : no skin lesions [No Skin Ulcers] : no skin ulcer [No Xanthoma] : no  xanthoma was observed [Oriented To Time, Place, And Person] : oriented to person, place, and time [Affect] : the affect was normal [No Anxiety] : not feeling anxious [Mood] : the mood was normal [FreeTextEntry1] : Rt LE plegia , Ambulates with cane

## 2020-08-21 NOTE — ADDENDUM
[FreeTextEntry1] : spoke to her PCP no hx of afib, \par AC is from new prosthetic MV\par agree with dc of coumadin\par cardiology note faxed to pcp\par \par Stoney David MD, PhD\par \par

## 2020-08-21 NOTE — HISTORY OF PRESENT ILLNESS
[FreeTextEntry1] : Ms. AICHA BILLY  is a 64 year-old woman PMH CAD s/p NSTEMI s/p CABG x4 ( LIMA to Circumflex, DELIA to LAD, Left radial graft to PDA, SVG to posterolateral branch of RCA ) , Mitral valve Replacement using 31 mm Epic porcine bio prosthesis, Exclusion of JARED with atriclip on 10/25/19.DM ty 2 on insulin, HTN, CVA with Rt LE plegia requiring cane use,  Possbile hx of Afib\par Since leaving the hospital she has been doing well  and has returned to her baseline activities without symptoms of CP or SOB,\par She had her coumadin stopped by her PCP\par

## 2020-10-07 ENCOUNTER — OUTPATIENT (OUTPATIENT)
Dept: OUTPATIENT SERVICES | Facility: HOSPITAL | Age: 65
LOS: 1 days | Discharge: STILL ADMITTED OR ADMINISTRATIVE | End: 2020-10-07

## 2020-10-07 DIAGNOSIS — Z95.1 PRESENCE OF AORTOCORONARY BYPASS GRAFT: Chronic | ICD-10-CM

## 2020-10-08 DIAGNOSIS — F29 UNSPECIFIED PSYCHOSIS NOT DUE TO A SUBSTANCE OR KNOWN PHYSIOLOGICAL CONDITION: ICD-10-CM

## 2020-10-28 ENCOUNTER — EMERGENCY (EMERGENCY)
Facility: HOSPITAL | Age: 65
LOS: 1 days | Discharge: HOME | End: 2020-10-28
Attending: EMERGENCY MEDICINE | Admitting: EMERGENCY MEDICINE
Payer: MEDICARE

## 2020-10-28 VITALS
RESPIRATION RATE: 18 BRPM | TEMPERATURE: 98 F | DIASTOLIC BLOOD PRESSURE: 92 MMHG | HEIGHT: 64 IN | OXYGEN SATURATION: 100 % | SYSTOLIC BLOOD PRESSURE: 158 MMHG | HEART RATE: 89 BPM

## 2020-10-28 VITALS
RESPIRATION RATE: 18 BRPM | HEART RATE: 85 BPM | TEMPERATURE: 98 F | SYSTOLIC BLOOD PRESSURE: 141 MMHG | OXYGEN SATURATION: 100 % | DIASTOLIC BLOOD PRESSURE: 85 MMHG

## 2020-10-28 DIAGNOSIS — F03.90 UNSPECIFIED DEMENTIA WITHOUT BEHAVIORAL DISTURBANCE: ICD-10-CM

## 2020-10-28 DIAGNOSIS — Z95.1 PRESENCE OF AORTOCORONARY BYPASS GRAFT: Chronic | ICD-10-CM

## 2020-10-28 LAB
ALBUMIN SERPL ELPH-MCNC: 4.3 G/DL — SIGNIFICANT CHANGE UP (ref 3.3–5)
ALP SERPL-CCNC: 85 U/L — SIGNIFICANT CHANGE UP (ref 40–120)
ALT FLD-CCNC: 24 U/L — SIGNIFICANT CHANGE UP (ref 4–33)
ANION GAP SERPL CALC-SCNC: 16 MMO/L — HIGH (ref 7–14)
APAP SERPL-MCNC: < 15 UG/ML — LOW (ref 15–25)
AST SERPL-CCNC: 15 U/L — SIGNIFICANT CHANGE UP (ref 4–32)
BASOPHILS # BLD AUTO: 0.04 K/UL — SIGNIFICANT CHANGE UP (ref 0–0.2)
BASOPHILS NFR BLD AUTO: 0.4 % — SIGNIFICANT CHANGE UP (ref 0–2)
BILIRUB SERPL-MCNC: < 0.2 MG/DL — LOW (ref 0.2–1.2)
BUN SERPL-MCNC: 33 MG/DL — HIGH (ref 7–23)
CALCIUM SERPL-MCNC: 9.6 MG/DL — SIGNIFICANT CHANGE UP (ref 8.4–10.5)
CHLORIDE SERPL-SCNC: 104 MMOL/L — SIGNIFICANT CHANGE UP (ref 98–107)
CO2 SERPL-SCNC: 19 MMOL/L — LOW (ref 22–31)
CREAT SERPL-MCNC: 0.82 MG/DL — SIGNIFICANT CHANGE UP (ref 0.5–1.3)
EOSINOPHIL # BLD AUTO: 0.28 K/UL — SIGNIFICANT CHANGE UP (ref 0–0.5)
EOSINOPHIL NFR BLD AUTO: 2.7 % — SIGNIFICANT CHANGE UP (ref 0–6)
ETHANOL BLD-MCNC: < 10 MG/DL — SIGNIFICANT CHANGE UP
GLUCOSE SERPL-MCNC: 192 MG/DL — HIGH (ref 70–99)
HCT VFR BLD CALC: 38.1 % — SIGNIFICANT CHANGE UP (ref 34.5–45)
HGB BLD-MCNC: 11.5 G/DL — SIGNIFICANT CHANGE UP (ref 11.5–15.5)
IMM GRANULOCYTES NFR BLD AUTO: 0.2 % — SIGNIFICANT CHANGE UP (ref 0–1.5)
LYMPHOCYTES # BLD AUTO: 3.24 K/UL — SIGNIFICANT CHANGE UP (ref 1–3.3)
LYMPHOCYTES # BLD AUTO: 31.8 % — SIGNIFICANT CHANGE UP (ref 13–44)
MCHC RBC-ENTMCNC: 25.1 PG — LOW (ref 27–34)
MCHC RBC-ENTMCNC: 30.2 % — LOW (ref 32–36)
MCV RBC AUTO: 83 FL — SIGNIFICANT CHANGE UP (ref 80–100)
MONOCYTES # BLD AUTO: 0.56 K/UL — SIGNIFICANT CHANGE UP (ref 0–0.9)
MONOCYTES NFR BLD AUTO: 5.5 % — SIGNIFICANT CHANGE UP (ref 2–14)
NEUTROPHILS # BLD AUTO: 6.05 K/UL — SIGNIFICANT CHANGE UP (ref 1.8–7.4)
NEUTROPHILS NFR BLD AUTO: 59.4 % — SIGNIFICANT CHANGE UP (ref 43–77)
NRBC # FLD: 0 K/UL — SIGNIFICANT CHANGE UP (ref 0–0)
PLATELET # BLD AUTO: 316 K/UL — SIGNIFICANT CHANGE UP (ref 150–400)
PMV BLD: 10 FL — SIGNIFICANT CHANGE UP (ref 7–13)
POTASSIUM SERPL-MCNC: 4.2 MMOL/L — SIGNIFICANT CHANGE UP (ref 3.5–5.3)
POTASSIUM SERPL-SCNC: 4.2 MMOL/L — SIGNIFICANT CHANGE UP (ref 3.5–5.3)
PROT SERPL-MCNC: 7.7 G/DL — SIGNIFICANT CHANGE UP (ref 6–8.3)
RBC # BLD: 4.59 M/UL — SIGNIFICANT CHANGE UP (ref 3.8–5.2)
RBC # FLD: 13.9 % — SIGNIFICANT CHANGE UP (ref 10.3–14.5)
SALICYLATES SERPL-MCNC: < 5 MG/DL — LOW (ref 15–30)
SARS-COV-2 RNA SPEC QL NAA+PROBE: SIGNIFICANT CHANGE UP
SODIUM SERPL-SCNC: 139 MMOL/L — SIGNIFICANT CHANGE UP (ref 135–145)
TSH SERPL-MCNC: 0.98 UIU/ML — SIGNIFICANT CHANGE UP (ref 0.27–4.2)
WBC # BLD: 10.19 K/UL — SIGNIFICANT CHANGE UP (ref 3.8–10.5)
WBC # FLD AUTO: 10.19 K/UL — SIGNIFICANT CHANGE UP (ref 3.8–10.5)

## 2020-10-28 PROCEDURE — 70450 CT HEAD/BRAIN W/O DYE: CPT | Mod: 26

## 2020-10-28 PROCEDURE — 90792 PSYCH DIAG EVAL W/MED SRVCS: CPT

## 2020-10-28 PROCEDURE — 99284 EMERGENCY DEPT VISIT MOD MDM: CPT

## 2020-10-28 NOTE — ED BEHAVIORAL HEALTH ASSESSMENT NOTE - CURRENT MEDICATION
Patient prescribed Risperdal 1mg qhs and Hydroxyzine 10 mg BID (patient does not take).     Other medications: Ferrous Sulfate 325mg BID, Lisinopril 5mg QD, Metroprolol XR 100mg QD, Hydrochloride 10mg PRN, Glipizide metformin 5/500 two tablets BID, Multivitamin, Vit D 92811 once a week, Folic Acid 1mg QD, Vitamin C 500mg QD, Aspirin 81mg QD, Atorvastatin 80mg QD.

## 2020-10-28 NOTE — ED BEHAVIORAL HEALTH ASSESSMENT NOTE - SUICIDE PROTECTIVE FACTORS
Identifies reasons for living/Supportive social network of family or friends/Congregational beliefs/Has future plans/Responsibility to family and others/Cultural, spiritual and/or moral attitudes against suicide

## 2020-10-28 NOTE — ED PROVIDER NOTE - OBJECTIVE STATEMENT
65 y/o F w/ PMH CABG and stroke 3 years ago, for which she's had some residual weakness, brought to the ER by daughter who states pt's personality has changed dramatically since the stroke. Pt is aggressive, labile and often inappropriate. Today, pt's daughter states her mother said people were coming into the house to have sex w/ her daughter, and she would be better off dead. Pt was seen at the crisis center 1 month ago, where she was given Risperdal(unsure if she was taking it).

## 2020-10-28 NOTE — ED ADULT NURSE NOTE - NSIMPLEMENTINTERV_GEN_ALL_ED
Implemented All Fall with Harm Risk Interventions:  [place] to call system. Call bell, personal items and telephone within reach. Instruct patient to call for assistance. Room bathroom lighting operational. Non-slip footwear when patient is off stretcher. Physically safe environment: no spills, clutter or unnecessary equipment. Stretcher in lowest position, wheels locked, appropriate side rails in place. Provide visual cue, wrist band, yellow gown, etc. Monitor gait and stability. Monitor for mental status changes and reorient to person, place, and time. Review medications for side effects contributing to fall risk. Reinforce activity limits and safety measures with patient and family. Provide visual clues: red socks.

## 2020-10-28 NOTE — ED ADULT NURSE NOTE - OBJECTIVE STATEMENT
Pt a&ox3 brought in from home by daughter after endorsing SI and altered mental status for the past several months. Pt presents to behavioral health, anxious and tearful, states she does not know why her daughter brought her here. Pt endorses that she is stressed out because her daughter is sick. Pt is calm and cooperative, denies any present medical complaints. Pt has h/o cva in past, gait unsteady, red socks placed on pt and placed in room closest to staff.

## 2020-10-28 NOTE — ED PROVIDER NOTE - PROGRESS NOTE DETAILS
NP Cameron- pt calm and cooperative with care, no behavioural issues. Labs- at baseline, ct - wnl at base, There is no clinical evidence of intoxication, or any acute medical problem requiring immediate intervention.  Daughter will pick pt up.

## 2020-10-28 NOTE — ED BEHAVIORAL HEALTH NOTE - BEHAVIORAL HEALTH NOTE
Writer was informed patient was medically and psychiatrically cleared.  Writer called pt's daughter Elza [phone] to inform her patient was being discharged.  She was informed she could bring patient to the Crisis [facility] for follow up before the appointment to [facility] in December.  She was in agreement to bring her mother to Crisis Clinic 11/5/2020 at 12:30pm.  She will transport pt at 2pm.

## 2020-10-28 NOTE — ED BEHAVIORAL HEALTH ASSESSMENT NOTE - RISK ASSESSMENT
Low Acute Suicide Risk Risk factors include recent passive SI (though pt adamantly denied intent), active psychosis, insomnia, marital status, multiple medical comorbidities, limited insight of psychotic symptoms and need for treatment, limited medication adherence. Protective factors include no active SI/I/P or HI/I/P, no prior history of SA, no active substance use, supportive family, no prior family history of SA, no prior history of inpatient hospitalization, future orientation, stable housing, help-seeking, able to engage in safety planning, no access to firearms. While patient has limited insight of psychotic symptoms, given multiple protective factors, including orientation, appropriately tending to ALDs, able to engage in safety planning, agreeable to re-start Risperdal and continue outpatient treatment at [facility], therefore overall the patient is not an acute and imminent risk of harm to self or others and does not meet criteria for psychiatric hospitalization for safety and stabilization.

## 2020-10-28 NOTE — ED BEHAVIORAL HEALTH NOTE - BEHAVIORAL HEALTH NOTE
COVID Exposure Screen- Patient    1.	*In the past 14 days, have you been around anyone with a positive COVID-19 test?*   (  ) Yes   ( X ) No   (  ) Unknown- Reason (e.g. patient uncertain, sedated, refusing to answer, etc.):  ______  IF YES PROCEED TO QUESTION #2. IF NO or UNKNOWN, PLEASE SKIP TO QUESTION #7  2.	Were you within 6 feet of them for at least 15 minutes? (  ) Yes   (  ) No   (  ) Unknown- Reason: ______    3.	Have you provided care for them? (  ) Yes   (  ) No   (  ) Unknown- Reason: ______    4.	Have you had direct physical contact with them (touched, hugged, or kissed them)? (  ) Yes   (  ) No    (  ) Unknown- Reason: ______    5.	Have you shared eating or drinking utensils with them? (  ) Yes   (  ) No    (  ) Unknown- Reason: ______    6.	Have they sneezed, coughed, or somehow got respiratory droplets on you? (  ) Yes   (  ) No    (  ) Unknown- Reason: ______    7.	*Have you been out of [place] within the past 14 days?*   Yes ( )    No   ( X ) Unknown- Reason (e.g. patient uncertain, sedated, refusing to answer, etc.): _______  IF YES PLEASE ANSWER THE FOLLOWING QUESTIONS:  8.	Which state/country have you been to? ______   9.	Were you there over 24 hours? (  ) Yes   (  ) No    (  ) Unknown- Reason: ______    10.	What date did you return to [facility]? ______

## 2020-10-28 NOTE — ED BEHAVIORAL HEALTH ASSESSMENT NOTE - DIFFERENTIAL
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 Given patient's age, symptoms unlikely to be related to primary psychotic disorder. 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 Given patient's age, symptoms unlikely to be related to primary psychotic disorder. r/o MDD w/ psychosis, r/o major neurocognitive disorder, r/o Delirium

## 2020-10-28 NOTE — ED PROVIDER NOTE - CPE EDP NEURO NORM
Airway patent, Nasal mucosa clear. Mouth with normal mucosa. Throat has no vesicles, no oropharyngeal exudates and uvula is midline.
normal...

## 2020-10-28 NOTE — ED ADULT NURSE REASSESSMENT NOTE - NS ED NURSE REASSESS COMMENT FT1
Pt discharged provided dc paper work, belongings returned to pt, pca helping pt get changed into clothes. Family member outside ED to  pt, pt to be escorted by PCA.
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
## 2020-10-28 NOTE — ED BEHAVIORAL HEALTH ASSESSMENT NOTE - OTHER PAST PSYCHIATRIC HISTORY (INCLUDE DETAILS REGARDING ONSET, COURSE OF ILLNESS, INPATIENT/OUTPATIENT TREATMENT)
No prior formal psychiatric diagnosis, family notes that patient has been having delusions since left CVA in January 2019.

## 2020-10-28 NOTE — ED BEHAVIORAL HEALTH ASSESSMENT NOTE - DETAILS
See above pt reports aunt with history of depression, denies family history of prior SA DM, HTN Discussed with primary team

## 2020-10-28 NOTE — ED PROVIDER NOTE - NSFOLLOWUPINSTRUCTIONS_ED_ALL_ED_FT
Please followup with crisis clinic, [place] walk-in [phone].  Please followup with geriatric psychiatry clinic.  Please take medications as prescribed.   Return to ED if any worsening symptoms.
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
## 2020-10-28 NOTE — ED BEHAVIORAL HEALTH ASSESSMENT NOTE - SAFETY PLAN ADDT'L DETAILS
Safety plan discussed with.../Provision of National Suicide Prevention Lifeline 4-303-937-TALK (0563)/Education provided regarding environmental safety / lethal means restriction

## 2020-10-28 NOTE — ED PROVIDER NOTE - CLINICAL SUMMARY MEDICAL DECISION MAKING FREE TEXT BOX
63 y/o F w/ mood disorder since stroke, possibly frontotemporal, now w/ inappropriate behavior, paranoia and suicidal statements. Will check labs and new CT (last one was 1 year ago). Pt to be evaluated by psychiatry.

## 2020-10-28 NOTE — ED BEHAVIORAL HEALTH ASSESSMENT NOTE - OTHER
Daughter Daughter Elza Chronic medical problems Deferred Not observed, pt ambulated w/ cane at baseline Marg[place] Mostly linear, perseverative on discharge, illogical at times due to delusions Patient denies AVH and did not appear to be responding to internal stimuli on exam though notes seeing "shadows of people" at night Daughter Elza Wise
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
## 2020-10-28 NOTE — ED BEHAVIORAL HEALTH NOTE - BEHAVIORAL HEALTH NOTE
Worker arranged for appointment on the [facility] calender for  Thursday 11/5 @ 12:30pm.
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
## 2020-10-28 NOTE — ED BEHAVIORAL HEALTH ASSESSMENT NOTE - AXIS III
L CVA in 1/19 (ambulates w/ cane), CAD/NSTEMI s/p CABG x4 in 10/19, HTN, DM, HLD L CVA in 1/19 (ambulates w/ cane), CAD/NSTEMI s/p CABG x4 in 10/19, HTN, DM, HLD.
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
## 2020-10-28 NOTE — ED PROVIDER NOTE - PATIENT PORTAL LINK FT
You can access the FollowMyHealth Patient Portal offered by [facility] by registering at the following website: [email or web address]. By joining [employer]’s FollowMyHealth portal, you will also be able to view your health information using other applications (apps) compatible with our system.

## 2021-03-26 ENCOUNTER — APPOINTMENT (OUTPATIENT)
Dept: CARDIOLOGY | Facility: CLINIC | Age: 66
End: 2021-03-26

## 2021-06-18 ENCOUNTER — APPOINTMENT (OUTPATIENT)
Dept: CARDIOLOGY | Facility: CLINIC | Age: 66
End: 2021-06-18
Payer: MEDICARE

## 2021-06-18 ENCOUNTER — NON-APPOINTMENT (OUTPATIENT)
Age: 66
End: 2021-06-18

## 2021-06-18 VITALS
WEIGHT: 140 LBS | HEIGHT: 64 IN | BODY MASS INDEX: 23.9 KG/M2 | DIASTOLIC BLOOD PRESSURE: 92 MMHG | SYSTOLIC BLOOD PRESSURE: 176 MMHG | HEART RATE: 61 BPM | OXYGEN SATURATION: 100 %

## 2021-06-18 VITALS — SYSTOLIC BLOOD PRESSURE: 150 MMHG | DIASTOLIC BLOOD PRESSURE: 90 MMHG

## 2021-06-18 PROCEDURE — 99072 ADDL SUPL MATRL&STAF TM PHE: CPT

## 2021-06-18 PROCEDURE — 93000 ELECTROCARDIOGRAM COMPLETE: CPT

## 2021-06-18 PROCEDURE — 99214 OFFICE O/P EST MOD 30 MIN: CPT

## 2021-06-24 RX ORDER — ACETAMINOPHEN 325 MG/1
325 TABLET ORAL EVERY 6 HOURS
Refills: 0 | Status: DISCONTINUED | COMMUNITY
Start: 2019-11-15 | End: 2021-06-24

## 2021-06-24 NOTE — HISTORY OF PRESENT ILLNESS
[FreeTextEntry1] : Ms. AICHA BILLY  is a 65 year-old woman PMH CAD s/p NSTEMI s/p CABG x4 ( LIMA to Circumflex, DELIA to LAD, Left radial graft to PDA, SVG to posterolateral branch of RCA ) , Mitral valve Replacement using 31 mm Epic porcine bio prosthesis, Exclusion of JARED with atriclip on 10/25/19.DM ty 2 on insulin, HTN, CVA with Rt LE plegia requiring cane use,  Possbile hx of Afib\par She continues to be doing well  and has returned to her baseline activities without symptoms of CP or SOB,\par She had her coumadin stopped by her PCP\par

## 2021-10-06 PROBLEM — I10 ESSENTIAL HYPERTENSION: Status: ACTIVE | Noted: 2019-11-18

## 2021-12-17 ENCOUNTER — APPOINTMENT (OUTPATIENT)
Dept: CARDIOLOGY | Facility: CLINIC | Age: 66
End: 2021-12-17

## 2022-03-10 ENCOUNTER — RESULT CHARGE (OUTPATIENT)
Age: 67
End: 2022-03-10

## 2022-03-11 ENCOUNTER — NON-APPOINTMENT (OUTPATIENT)
Age: 67
End: 2022-03-11

## 2022-03-11 ENCOUNTER — APPOINTMENT (OUTPATIENT)
Dept: CARDIOLOGY | Facility: CLINIC | Age: 67
End: 2022-03-11
Payer: MEDICARE

## 2022-03-11 ENCOUNTER — APPOINTMENT (OUTPATIENT)
Dept: ELECTROPHYSIOLOGY | Facility: CLINIC | Age: 67
End: 2022-03-11
Payer: MEDICARE

## 2022-03-11 VITALS
HEART RATE: 71 BPM | SYSTOLIC BLOOD PRESSURE: 171 MMHG | BODY MASS INDEX: 23.9 KG/M2 | HEIGHT: 64 IN | DIASTOLIC BLOOD PRESSURE: 78 MMHG | OXYGEN SATURATION: 99 % | WEIGHT: 140 LBS

## 2022-03-11 DIAGNOSIS — Z86.79 PERSONAL HISTORY OF OTHER DISEASES OF THE CIRCULATORY SYSTEM: ICD-10-CM

## 2022-03-11 PROCEDURE — 99072 ADDL SUPL MATRL&STAF TM PHE: CPT

## 2022-03-11 PROCEDURE — 93000 ELECTROCARDIOGRAM COMPLETE: CPT

## 2022-03-11 PROCEDURE — 99214 OFFICE O/P EST MOD 30 MIN: CPT

## 2022-03-11 RX ORDER — GLIPIZIDE AND METFORMIN HYDROCHLORIDE 5; 500 MG/1; MG/1
5-500 TABLET, FILM COATED ORAL
Refills: 0 | Status: ACTIVE | COMMUNITY
Start: 2022-03-11

## 2022-03-11 RX ORDER — METFORMIN HYDROCHLORIDE 500 MG/1
500 TABLET, COATED ORAL
Qty: 60 | Refills: 11 | Status: DISCONTINUED | COMMUNITY
End: 2022-03-11

## 2022-03-11 NOTE — HISTORY OF PRESENT ILLNESS
[FreeTextEntry1] : Ms. AICHA BILLY is a 66 year-old woman PMH CAD s/p NSTEMI s/p CABG x4 ( LIMA to Circumflex, DELIA to LAD, Left radial graft to PDA, SVG to posterolateral branch of RCA ) , Mitral valve Replacement using 31 mm Epic porcine bio prosthesis, Exclusion of JARED with atriclip on 10/25/19.DM ty 2 on insulin, HTN, CVA with Rt LE plegia requiring cane use, Possbile hx of Afib\par She continues to be doing well and has returned to her baseline activities without symptoms of CP or SOB,\par She had her coumadin stopped by her PCP\par -\par today she presents for routine fu\par \par she denies chest pain and SOB\par no palpitations\par \par  was told to check her HR by her PCP as concern for arrhythmia as prior concern for afib\par \par  gets labs with PCP will send

## 2022-04-05 PROCEDURE — 99072 ADDL SUPL MATRL&STAF TM PHE: CPT

## 2022-04-05 PROCEDURE — 93248 EXT ECG>7D<15D REV&INTERPJ: CPT

## 2022-04-06 ENCOUNTER — NON-APPOINTMENT (OUTPATIENT)
Age: 67
End: 2022-04-06

## 2022-04-06 DIAGNOSIS — I47.2 VENTRICULAR TACHYCARDIA: ICD-10-CM

## 2022-04-06 PROCEDURE — 99072 ADDL SUPL MATRL&STAF TM PHE: CPT

## 2022-04-06 PROCEDURE — 99212 OFFICE O/P EST SF 10 MIN: CPT

## 2022-04-07 PROBLEM — I47.2 NSVT (NONSUSTAINED VENTRICULAR TACHYCARDIA): Status: ACTIVE | Noted: 2022-04-07

## 2022-04-19 ENCOUNTER — APPOINTMENT (OUTPATIENT)
Dept: CV DIAGNOSTICS | Facility: HOSPITAL | Age: 67
End: 2022-04-19

## 2022-04-19 ENCOUNTER — OUTPATIENT (OUTPATIENT)
Dept: OUTPATIENT SERVICES | Facility: HOSPITAL | Age: 67
LOS: 1 days | End: 2022-04-19
Payer: MEDICARE

## 2022-04-19 DIAGNOSIS — I47.2 VENTRICULAR TACHYCARDIA: ICD-10-CM

## 2022-04-19 DIAGNOSIS — Z95.1 PRESENCE OF AORTOCORONARY BYPASS GRAFT: Chronic | ICD-10-CM

## 2022-04-19 PROCEDURE — 93018 CV STRESS TEST I&R ONLY: CPT

## 2022-04-19 PROCEDURE — A9500: CPT

## 2022-04-19 PROCEDURE — 78452 HT MUSCLE IMAGE SPECT MULT: CPT | Mod: MH

## 2022-04-19 PROCEDURE — 78452 HT MUSCLE IMAGE SPECT MULT: CPT | Mod: 26,MH

## 2022-04-19 PROCEDURE — 93016 CV STRESS TEST SUPVJ ONLY: CPT

## 2022-04-19 PROCEDURE — 93017 CV STRESS TEST TRACING ONLY: CPT

## 2022-04-20 ENCOUNTER — TRANSCRIPTION ENCOUNTER (OUTPATIENT)
Age: 67
End: 2022-04-20

## 2022-06-08 ENCOUNTER — APPOINTMENT (OUTPATIENT)
Dept: CARDIOLOGY | Facility: CLINIC | Age: 67
End: 2022-06-08

## 2022-06-09 ENCOUNTER — APPOINTMENT (OUTPATIENT)
Dept: CARDIOLOGY | Facility: CLINIC | Age: 67
End: 2022-06-09

## 2022-06-09 NOTE — HISTORY OF PRESENT ILLNESS
[FreeTextEntry1] : AICHA BILLY is a 67 yo F PMH CAD s/p NSTEMI s/p CABG x4 , ( LIMA to Circumflex, DELIA to LAD, Left radial graft to PDA, SVG to posterolateral branch of RCA ) , Mitral valve Replacement using 31 mm Epic porcine bio prosthesis, Exclusion of JARED with atriclip on 10/25/19.DM ty 2 on insulin, HTN, CVA with Rt LE plegia requiring cane use, \par during a workup for Possible hx of Afib she wore a 2 week event monitor, no Afib but SVT and NSVT\par \par \par NSVT seen on event monitor \par stress test with infarct with tracee-infarct ischemia and mild ischemia\par trial of medical therapy\par \par she is asymptomatic \par on medical therapy with lipitor 80mg\par toprol XL 100mg\par lisinopril 5mg\par glipizide-metformin\par \par \par NO SHOW 6/9/22 telehealth\par multiple calls made and intive emailed

## 2022-07-18 NOTE — ED BEHAVIORAL HEALTH ASSESSMENT NOTE - NS ED BHA MSE SPEECH ARTICULATION
Tazorac Pregnancy And Lactation Text: This medication is not safe during pregnancy. It is unknown if this medication is excreted in breast milk. Normal

## 2022-08-22 NOTE — PROGRESS NOTE ADULT - PROBLEM SELECTOR PLAN 2
S/P CABG. On medications,  no chest pain, stable, monitored and followed up by primary cardiothoracic team/cardiology team. Fall with Harm Risk

## 2022-09-13 NOTE — ED BEHAVIORAL HEALTH ASSESSMENT NOTE - DESCRIPTION
Chief Complaint    Knee Pain (Robert right knee)      History of Present Illness:  Guille Watson is a 57 y.o. female who presents for an evaluation of her right knee pain. This patient is known to Ra Orellana and is being treated conservatively for the arthritis in her right knee. This conservative treatment includes: rest, ice, elevation, bracing, home exercises, activity modification, NSAIDs as needed, corticosteroid injections and viscosupplementation injections. The patient denies any new injury. The patient denies any numbness, paresthesias, or weakness. History from the patient's visit on 04/12/22. Guille Watson is a 57 y.o. female who presents for evaluation of right knee pain. The patient states her pain started back in 2015. It was treated with cortisone and Visco injections followed by knee arthroscopy in 2015 by  then lupe jimenez. The patient continued. Her pain has gotten worse over the past few months. She describes it as constant, dull achy and gets more sharp with increased activity level, going down stairs or bending her knee. Patient had her left knee replacement in August 2019 with Dr. Melva Leigh. She is very satisfied with the outcome. She enjoys playing tennis with her grandchildren and she does yoga as well. The patient has been taking Tylenol and Advil, however, her pain still wakes her up at night. Patient had last steroid injection on 9/20/2021 and that if her relief for 4 to 5 months. The patient denies any new injury. The patient denies any catching, giving way, joint locking, numbness, paresthesias, or weakness. The patient denies any history of blood clots. Physical exam:  Inspection: Both knees without erythema, ecchymosis, discoloration, abrasion, contusions, deformity or signs of infection. Palpation: There is tenderness to palpation to the joint line of the right knee. There is patellofemoral crepitus palpable in both knees.   No tenderness to palpation to any other osseous or soft tissue structures of the knee. Range of motion: Grossly intact  Neurovascular: Patient is neurovascularly intact, equally bilaterally. 2+ dorsal pedal pulses. Procedures:  After informed consent was provided, the patient was seated on the exam table with the right knee flexed to 90 degrees. The anterolateral aspect of the knee adjacent to the joint line was prepped with Chlora-prep. The skin and subcutaneous tissues were anesthetized with ethyl chloride spray. A 22-gauge needle was inserted into the right knee and 2 ml of 40 mg/ml DepoMedrol was injected. The needle was withdrawn and the puncture site sealed with a Band-Aid. The patient tolerated the procedure well. Post injection instructions and precautions given and any problems to notify us. (Conducted by Adalgisa Wallace PA-C)      Assessment:  Encounter Diagnosis   Name Primary? Primary osteoarthritis of right knee Yes         Treatment plan:  Observe postinjection precautions  Rest   Ice 20 minutes ever 1-2 hours PRN  Utilize medications as prescribed  Activity modification  Lightweight exercise/low impact exercise  Appropriate diet/weight loss                  Adalgisa Wallace PA-C  09/13/22 11:42 AM  Physician Assistant - Certified      This dictation was performed with a verbal recognition program (DRAGON) and it was checked for errors. It is possible that there are still dictated errors within this office note. If so, please bring any errors to my attention for an addendum. All efforts were made to ensure that this office note is accurate. Calm and cooperative in ED.     Vital Signs Last 24 Hrs  T(C): 36.7 (28 Oct 2020 11:06), Max: 36.7 (28 Oct 2020 10:11)  T(F): 98 (28 Oct 2020 11:06), Max: 98 (28 Oct 2020 10:11)  HR: 85 (28 Oct 2020 11:06) (85 - 89)  BP: 141/85 (28 Oct 2020 11:06) (141/85 - 158/92)  BP(mean): --  RR: 18 (28 Oct 2020 11:06) (18 - 18)  SpO2: 100% (28 Oct 2020 11:06) (100% - 100%) Born in [facility] L CVA in 1/19 (ambulates w/ cane), CAD/NSTEMI s/p CABG x4 in 10/19, HTN, DM, HLD Alert and cooperative in ED, tearful at times when discussing delusional content and wanting to go home.      Vital Signs Last 24 Hrs  T(C): 36.7 (28 Oct 2020 11:06), Max: 36.7 (28 Oct 2020 10:11)  T(F): 98 (28 Oct 2020 11:06), Max: 98 (28 Oct 2020 10:11)  HR: 85 (28 Oct 2020 11:06) (85 - 89)  BP: 141/85 (28 Oct 2020 11:06) (141/85 - 158/92)  BP(mean): --  RR: 18 (28 Oct 2020 11:06) (18 - 18)  SpO2: 100% (28 Oct 2020 11:06) (100% - 100%)

## 2023-01-01 NOTE — PROGRESS NOTE ADULT - PROBLEM SELECTOR PLAN 3
Rounded on pt. Stated that she feels BR is going better. Denies need for assistance at this time. D/c teaching done. Mom will continue to exclusively breastfeed frequently & on cue at least 8+ times/24 hrs.  Will monitor for signs of deep latch & adequate fdg; I&O.  Will have baby's weight checked at ped's office in the next couple of days after d/c from hospital as recommended. Discussed available resources in Breastfeeding Guide. Instructed to call for any questions/needs. Verbalized understanding.       Suggest to continue medications, monitoring, FU primary team recommendations.

## 2023-07-28 NOTE — DISCHARGE NOTE PROVIDER - NSFOLLOWUPCLINICS_GEN_ALL_ED_FT
Waldo Han MD  You17 hours ago (5:13 PM)     Both look good.  I recommend a calcium score.     Spoke to patient, reviewed test results and recommendation from Dr Han along with OOP cost of $110 for test. She was agreeable to plan, though she thought she was supposed to wait to do this until after she was done breastfeeding. She also wonders if she should have a cholesterol panel. Dr Han messaged.        [facility]  Cardiology  [address] [place] [id number]  Phone: [phone]  Fax:   Follow Up Time: 2 weeks

## 2023-09-07 NOTE — PRE-OP CHECKLIST - TEMPERATURE IN CELSIUS (DEGREES C)
Consent: The patient's consent was obtained to treat lesions. Pre-malignant nature of lesions was discussed. Risks of cryotherapy include but are not limited to crusting, scabbing, blistering, scarring, darker or lighter pigmentary change, recurrence, incomplete removal and infection. Pt to return to clinic if lesion not resolved in 3-4 weeks. Show Aperture Variable?: Yes Duration Of Freeze Thaw-Cycle (Seconds): 0 Detail Level: Simple Post-Care Instructions: I reviewed with the patient in detail post-care instructions. Patient is to wear sun protection. Pt may apply Vaseline to crusted or scabbing areas. OK to wash gently while bathing. Return to clinic if lesion not resolved in 3-4 weeks. Render Note In Bullet Format When Appropriate: No 36.9

## 2023-10-26 NOTE — PROGRESS NOTE ADULT - SUBJECTIVE AND OBJECTIVE BOX
Chief complaint  Patient is a 63y old  Female who presents with a chief complaint of NSTEMI (27 Oct 2019 06:48)   Review of systems  Patient in bed, looks comfortable, no fever, no hypoglycemia.    Labs and Fingersticks  CAPILLARY BLOOD GLUCOSE  176 (27 Oct 2019 14:00)  205 (27 Oct 2019 12:00)  226 (27 Oct 2019 10:00)  239 (27 Oct 2019 09:00)  238 (27 Oct 2019 08:00)  159 (27 Oct 2019 07:00)  153 (27 Oct 2019 06:00)  156 (27 Oct 2019 05:00)  182 (27 Oct 2019 04:00)  172 (27 Oct 2019 03:00)  162 (27 Oct 2019 02:00)  132 (27 Oct 2019 01:00)  124 (27 Oct 2019 00:00)  96 (26 Oct 2019 23:00)  104 (26 Oct 2019 22:00)  109 (26 Oct 2019 21:00)  118 (26 Oct 2019 20:00)  129 (26 Oct 2019 19:00)  130 (26 Oct 2019 18:00)  131 (26 Oct 2019 17:00)  135 (26 Oct 2019 16:00)  133 (26 Oct 2019 15:00)      POCT Blood Glucose.: 176 mg/dL (27 Oct 2019 13:59)  POCT Blood Glucose.: 226 mg/dL (27 Oct 2019 10:01)  POCT Blood Glucose.: 239 mg/dL (27 Oct 2019 09:06)  POCT Blood Glucose.: 153 mg/dL (27 Oct 2019 05:55)  POCT Blood Glucose.: 156 mg/dL (27 Oct 2019 05:16)  POCT Blood Glucose.: 172 mg/dL (27 Oct 2019 02:57)  POCT Blood Glucose.: 162 mg/dL (27 Oct 2019 01:54)  POCT Blood Glucose.: 124 mg/dL (26 Oct 2019 23:56)  POCT Blood Glucose.: 96 mg/dL (26 Oct 2019 22:51)  POCT Blood Glucose.: 104 mg/dL (26 Oct 2019 21:49)  POCT Blood Glucose.: 118 mg/dL (26 Oct 2019 19:48)  POCT Blood Glucose.: 131 mg/dL (26 Oct 2019 16:48)  POCT Blood Glucose.: 135 mg/dL (26 Oct 2019 15:49)      Anion Gap, Serum: 15 (10-27 @ 01:01)  Anion Gap, Serum: 16 (10-26 @ 19:54)  Anion Gap, Serum: 19 <H> (10-26 @ 00:47)      Calcium, Total Serum: 8.3 <L> (10-27 @ 01:01)  Calcium, Total Serum: 7.9 <L> (10-26 @ 19:54)  Calcium, Total Serum: 7.6 <L> (10-26 @ 00:47)  Albumin, Serum: 3.6 (10-27 @ 01:01)  Albumin, Serum: 3.4 (10-26 @ 19:54)  Albumin, Serum: 2.5 <L> (10-26 @ 00:47)    Alanine Aminotransferase (ALT/SGPT): 40 (10-27 @ 01:01)  Alanine Aminotransferase (ALT/SGPT): 47 <H> (10-26 @ 19:54)  Alanine Aminotransferase (ALT/SGPT): 56 <H> (10-26 @ 00:47)  Alkaline Phosphatase, Serum: 44 (10-27 @ 01:01)  Alkaline Phosphatase, Serum: 42 (10-26 @ 19:54)  Alkaline Phosphatase, Serum: 45 (10-26 @ 00:47)  Aspartate Aminotransferase (AST/SGOT): 65 <H> (10-27 @ 01:01)  Aspartate Aminotransferase (AST/SGOT): 62 <H> (10-26 @ 19:54)  Aspartate Aminotransferase (AST/SGOT): 80 <H> (10-26 @ 00:47)        10-27    145  |  110<H>  |  28<H>  ----------------------------<  145<H>  4.6   |  20<L>  |  1.54<H>    Ca    8.3<L>      27 Oct 2019 01:01  Phos  4.7     10-27  Mg     3.6     10-27    TPro  5.4<L>  /  Alb  3.6  /  TBili  2.0<H>  /  DBili  x   /  AST  65<H>  /  ALT  40  /  AlkPhos  44  10-27                        9.3    13.72 )-----------( 122      ( 27 Oct 2019 01:01 )             27.8     Medications  MEDICATIONS  (STANDING):  aMIOdarone Infusion 0.5 mG/Min (16.667 mL/Hr) IV Continuous <Continuous>  aspirin  chewable 81 milliGRAM(s) Oral daily  atorvastatin 80 milliGRAM(s) Oral at bedtime  chlorhexidine 2% Cloths 1 Application(s) Topical <User Schedule>  dextrose 50% Injectable 50 milliLiter(s) IV Push every 15 minutes  dextrose 50% Injectable 25 milliLiter(s) IV Push every 15 minutes  heparin  Infusion 500 Unit(s)/Hr (8.5 mL/Hr) IV Continuous <Continuous>  insulin regular Infusion 3 Unit(s)/Hr (3 mL/Hr) IV Continuous <Continuous>  lidocaine   Infusion 1 mG/Min (7.5 mL/Hr) IV Continuous <Continuous>  milrinone Infusion 0.2 MICROgram(s)/kG/Min (3.474 mL/Hr) IV Continuous <Continuous>  pantoprazole  Injectable 40 milliGRAM(s) IV Push daily  polyethylene glycol 3350 17 Gram(s) Oral daily  senna 2 Tablet(s) Oral at bedtime  sodium chloride 0.9%. 1000 milliLiter(s) (10 mL/Hr) IV Continuous <Continuous>      Physical Exam  General: Patient comfortable in bed  Vital Signs Last 12 Hrs  T(F): 97.9 (10-27-19 @ 12:00), Max: 98.2 (10-27-19 @ 08:00)  HR: 74 (10-27-19 @ 14:00) (59 - 109)  BP: --  BP(mean): --  RR: 23 (10-27-19 @ 14:00) (17 - 38)  SpO2: 98% (10-27-19 @ 14:00) (95% - 100%)  Neck: No palpable thyroid nodules.  CVS: S1S2, No murmurs  Respiratory: No wheezing, no crepitations  GI: Abdomen soft, bowel sounds positive  Musculoskeletal:  edema lower extremities.   Skin: No skin rashes, no ecchymosis    Diagnostics Chief complaint  Patient is a 63y old  Female who presents with a chief complaint of NSTEMI (27 Oct 2019 06:48)   Review of systems  Patient in bed, looks comfortable, no fever,  no hypoglycemia.    Labs and Fingersticks  CAPILLARY BLOOD GLUCOSE  176 (27 Oct 2019 14:00)  205 (27 Oct 2019 12:00)  226 (27 Oct 2019 10:00)  239 (27 Oct 2019 09:00)  238 (27 Oct 2019 08:00)  159 (27 Oct 2019 07:00)  153 (27 Oct 2019 06:00)  156 (27 Oct 2019 05:00)  182 (27 Oct 2019 04:00)  172 (27 Oct 2019 03:00)  162 (27 Oct 2019 02:00)  132 (27 Oct 2019 01:00)  124 (27 Oct 2019 00:00)  96 (26 Oct 2019 23:00)  104 (26 Oct 2019 22:00)  109 (26 Oct 2019 21:00)  118 (26 Oct 2019 20:00)  129 (26 Oct 2019 19:00)  130 (26 Oct 2019 18:00)  131 (26 Oct 2019 17:00)  135 (26 Oct 2019 16:00)  133 (26 Oct 2019 15:00)      POCT Blood Glucose.: 176 mg/dL (27 Oct 2019 13:59)  POCT Blood Glucose.: 226 mg/dL (27 Oct 2019 10:01)  POCT Blood Glucose.: 239 mg/dL (27 Oct 2019 09:06)  POCT Blood Glucose.: 153 mg/dL (27 Oct 2019 05:55)  POCT Blood Glucose.: 156 mg/dL (27 Oct 2019 05:16)  POCT Blood Glucose.: 172 mg/dL (27 Oct 2019 02:57)  POCT Blood Glucose.: 162 mg/dL (27 Oct 2019 01:54)  POCT Blood Glucose.: 124 mg/dL (26 Oct 2019 23:56)  POCT Blood Glucose.: 96 mg/dL (26 Oct 2019 22:51)  POCT Blood Glucose.: 104 mg/dL (26 Oct 2019 21:49)  POCT Blood Glucose.: 118 mg/dL (26 Oct 2019 19:48)  POCT Blood Glucose.: 131 mg/dL (26 Oct 2019 16:48)  POCT Blood Glucose.: 135 mg/dL (26 Oct 2019 15:49)      Anion Gap, Serum: 15 (10-27 @ 01:01)  Anion Gap, Serum: 16 (10-26 @ 19:54)  Anion Gap, Serum: 19 <H> (10-26 @ 00:47)      Calcium, Total Serum: 8.3 <L> (10-27 @ 01:01)  Calcium, Total Serum: 7.9 <L> (10-26 @ 19:54)  Calcium, Total Serum: 7.6 <L> (10-26 @ 00:47)  Albumin, Serum: 3.6 (10-27 @ 01:01)  Albumin, Serum: 3.4 (10-26 @ 19:54)  Albumin, Serum: 2.5 <L> (10-26 @ 00:47)    Alanine Aminotransferase (ALT/SGPT): 40 (10-27 @ 01:01)  Alanine Aminotransferase (ALT/SGPT): 47 <H> (10-26 @ 19:54)  Alanine Aminotransferase (ALT/SGPT): 56 <H> (10-26 @ 00:47)  Alkaline Phosphatase, Serum: 44 (10-27 @ 01:01)  Alkaline Phosphatase, Serum: 42 (10-26 @ 19:54)  Alkaline Phosphatase, Serum: 45 (10-26 @ 00:47)  Aspartate Aminotransferase (AST/SGOT): 65 <H> (10-27 @ 01:01)  Aspartate Aminotransferase (AST/SGOT): 62 <H> (10-26 @ 19:54)  Aspartate Aminotransferase (AST/SGOT): 80 <H> (10-26 @ 00:47)        10-27    145  |  110<H>  |  28<H>  ----------------------------<  145<H>  4.6   |  20<L>  |  1.54<H>    Ca    8.3<L>      27 Oct 2019 01:01  Phos  4.7     10-27  Mg     3.6     10-27    TPro  5.4<L>  /  Alb  3.6  /  TBili  2.0<H>  /  DBili  x   /  AST  65<H>  /  ALT  40  /  AlkPhos  44  10-27                        9.3    13.72 )-----------( 122      ( 27 Oct 2019 01:01 )             27.8     Medications  MEDICATIONS  (STANDING):  aMIOdarone Infusion 0.5 mG/Min (16.667 mL/Hr) IV Continuous <Continuous>  aspirin  chewable 81 milliGRAM(s) Oral daily  atorvastatin 80 milliGRAM(s) Oral at bedtime  chlorhexidine 2% Cloths 1 Application(s) Topical <User Schedule>  dextrose 50% Injectable 50 milliLiter(s) IV Push every 15 minutes  dextrose 50% Injectable 25 milliLiter(s) IV Push every 15 minutes  heparin  Infusion 500 Unit(s)/Hr (8.5 mL/Hr) IV Continuous <Continuous>  insulin regular Infusion 3 Unit(s)/Hr (3 mL/Hr) IV Continuous <Continuous>  lidocaine   Infusion 1 mG/Min (7.5 mL/Hr) IV Continuous <Continuous>  milrinone Infusion 0.2 MICROgram(s)/kG/Min (3.474 mL/Hr) IV Continuous <Continuous>  pantoprazole  Injectable 40 milliGRAM(s) IV Push daily  polyethylene glycol 3350 17 Gram(s) Oral daily  senna 2 Tablet(s) Oral at bedtime  sodium chloride 0.9%. 1000 milliLiter(s) (10 mL/Hr) IV Continuous <Continuous>      Physical Exam  General: Patient comfortable in bed  Vital Signs Last 12 Hrs  T(F): 97.9 (10-27-19 @ 12:00), Max: 98.2 (10-27-19 @ 08:00)  HR: 74 (10-27-19 @ 14:00) (59 - 109)  BP: --  BP(mean): --  RR: 23 (10-27-19 @ 14:00) (17 - 38)  SpO2: 98% (10-27-19 @ 14:00) (95% - 100%)  Neck: No palpable thyroid nodules.  CVS: S1S2, No murmurs  Respiratory: No wheezing, no crepitations  GI: Abdomen soft, bowel sounds positive  Musculoskeletal:  edema lower extremities.   Skin: No skin rashes, no ecchymosis    Diagnostics Oxybutynin Counseling:  I discussed with the patient the risks of oxybutynin including but not limited to skin rash, drowsiness, dry mouth, difficulty urinating, and blurred vision.

## 2024-02-24 NOTE — PROGRESS NOTE ADULT - PROBLEM SELECTOR PLAN 1
NPO at midnight   Hibiclens  shower tonight and am  Peridex rinse in am  Zinacef on all to OR taped to chart  CABG first case in am with Dr Momin NPO at midnight   Hibiclens  shower tonight and am  Peridex rinse in am  Zinacef on all to OR taped to chart  IABP in am CABG  2nd case with Dr Momin Statement Selected

## 2024-11-07 NOTE — ED PROVIDER NOTE - CPE EDP RESP NORM
Colonoscopy   WHAT YOU NEED TO KNOW:   A colonoscopy is a procedure to examine the inside of your colon (intestine) with a scope. Polyps or tissue growths may have been removed during your colonoscopy. It is normal to feel bloated and to have some abdominal discomfort. You should be passing gas. If you have hemorrhoids or you had polyps removed, you may have a small amount of bleeding.        DISCHARGE INSTRUCTIONS:   Seek care immediately if:   You have sudden, severe abdominal pain.     You have problems swallowing.     You have a large amount of black, sticky bowel movements or blood in your bowel movements.     You have sudden trouble breathing.     You feel weak, lightheaded, or faint or your heart beats faster than normal for you.     Contact your healthcare provider if:   You have a fever and chills.      You have nausea or are vomiting.      Your abdomen is bloated or feels full and hard.     You have abdominal pain.   You have black, sticky bowel movements or blood in your bowel movements.  You have not had a bowel movement for 3 days after your procedure.  You have rash or hives.  You have questions or concerns about your procedure.    Activity:   Do not lift, strain, or run for 24 hours after your procedure.     Rest after your procedure. You have been given medicine to relax you. Do not drive or make important decisions until the day after your procedure. Return to your normal activity as directed.     Relieve gas and discomfort from bloating by lying on your right side with a heating pad on your abdomen. You may need to take short walks to help the gas move out. Eat small meals until bloating is relieved.  Follow up with your healthcare provider as directed: Write down your questions so you remember to ask them during your visits.     If you take a “blood thinner”, please review the specific instructions from your endoscopist about when you should resume it. These can be found in the “Recommendation”  and “Your Medication list” sections of this After Visit Summary.       normal...

## 2024-11-22 NOTE — BH SAFETY PLAN - SUICIDE PREVENTION LIFELINE PHONES
Tylenol/Motrin (rotate)  Flonase for nasal congestion  May use humidifier, vicks on chest and feet   Increase fluid intake  Follow up with primary care provider   [phone] ([phone])

## 2025-03-29 NOTE — PROGRESS NOTE ADULT - SUBJECTIVE AND OBJECTIVE BOX
Chief complaint  Patient is a 63y old  Female who presents with a chief complaint of NSTEMI (30 Oct 2019 11:22)   Review of systems  Patient in bed, looks comfortable, no fever, no hypoglycemia.    Labs and Fingersticks  CAPILLARY BLOOD GLUCOSE  107 (30 Oct 2019 08:00)  212 (29 Oct 2019 19:00)  163 (29 Oct 2019 18:00)  158 (29 Oct 2019 17:00)  162 (29 Oct 2019 16:00)  222 (29 Oct 2019 15:00)  262 (29 Oct 2019 14:00)  268 (29 Oct 2019 13:00)      POCT Blood Glucose.: 107 mg/dL (30 Oct 2019 08:23)  POCT Blood Glucose.: 132 mg/dL (30 Oct 2019 07:21)  POCT Blood Glucose.: 138 mg/dL (30 Oct 2019 06:15)  POCT Blood Glucose.: 165 mg/dL (30 Oct 2019 04:24)  POCT Blood Glucose.: 104 mg/dL (30 Oct 2019 02:32)  POCT Blood Glucose.: 87 mg/dL (30 Oct 2019 01:17)  POCT Blood Glucose.: 117 mg/dL (30 Oct 2019 00:07)  POCT Blood Glucose.: 195 mg/dL (29 Oct 2019 22:14)  POCT Blood Glucose.: 227 mg/dL (29 Oct 2019 21:42)  POCT Blood Glucose.: 212 mg/dL (29 Oct 2019 18:52)  POCT Blood Glucose.: 158 mg/dL (29 Oct 2019 16:19)  POCT Blood Glucose.: 221 mg/dL (29 Oct 2019 14:51)  POCT Blood Glucose.: 262 mg/dL (29 Oct 2019 13:41)      Anion Gap, Serum: 7 (10-30 @ 02:22)  Anion Gap, Serum: 10 (10-29 @ 00:22)  Anion Gap, Serum: 12 (10-28 @ 14:25)      Calcium, Total Serum: 7.5 <L> (10-30 @ 02:22)  Calcium, Total Serum: 7.5 <L> (10-29 @ 00:22)  Calcium, Total Serum: 7.6 <L> (10-28 @ 14:25)  Albumin, Serum: 2.4 <L> (10-30 @ 02:22)  Albumin, Serum: 2.8 <L> (10-29 @ 00:22)  Albumin, Serum: 2.8 <L> (10-28 @ 14:25)    Alanine Aminotransferase (ALT/SGPT): 42 (10-30 @ 02:22)  Alanine Aminotransferase (ALT/SGPT): 58 <H> (10-29 @ 00:22)  Alanine Aminotransferase (ALT/SGPT): 52 <H> (10-28 @ 14:25)  Alkaline Phosphatase, Serum: 94 (10-30 @ 02:22)  Alkaline Phosphatase, Serum: 87 (10-29 @ 00:22)  Alkaline Phosphatase, Serum: 78 (10-28 @ 14:25)  Aspartate Aminotransferase (AST/SGOT): 26 (10-30 @ 02:22)  Aspartate Aminotransferase (AST/SGOT): 44 <H> (10-29 @ 00:22)  Aspartate Aminotransferase (AST/SGOT): 66 <H> (10-28 @ 14:25)        10-30    135  |  106  |  35<H>  ----------------------------<  87  4.2   |  22  |  1.18    Ca    7.5<L>      30 Oct 2019 02:22  Phos  2.3     10-30  Mg     2.0     10-30    TPro  5.1<L>  /  Alb  2.4<L>  /  TBili  0.6  /  DBili  x   /  AST  26  /  ALT  42  /  AlkPhos  94  10-30                        9.4    11.27 )-----------( 164      ( 30 Oct 2019 02:22 )             28.2     Medications  MEDICATIONS  (STANDING):  aMIOdarone    Tablet 200 milliGRAM(s) Oral daily  aspirin  chewable 81 milliGRAM(s) Oral daily  atorvastatin 80 milliGRAM(s) Oral at bedtime  chlorhexidine 2% Cloths 1 Application(s) Topical <User Schedule>  dextrose 10% Bolus 125 milliLiter(s) IV Bolus once  dextrose 10% Bolus 250 milliLiter(s) IV Bolus once  dextrose 5%. 1000 milliLiter(s) (50 mL/Hr) IV Continuous <Continuous>  dextrose 50% Injectable 50 milliLiter(s) IV Push every 15 minutes  dextrose 50% Injectable 25 milliLiter(s) IV Push every 15 minutes  DOBUTamine Infusion 2 MICROgram(s)/kG/Min (3.474 mL/Hr) IV Continuous <Continuous>  furosemide    Tablet 40 milliGRAM(s) Oral daily  heparin  Infusion 800 Unit(s)/Hr (8.5 mL/Hr) IV Continuous <Continuous>  insulin glargine Injectable (LANTUS) 16 Unit(s) SubCutaneous at bedtime  insulin lispro (HumaLOG) corrective regimen sliding scale   SubCutaneous three times a day before meals  insulin lispro (HumaLOG) corrective regimen sliding scale   SubCutaneous at bedtime  insulin lispro Injectable (HumaLOG) 7 Unit(s) SubCutaneous three times a day before meals  pantoprazole    Tablet 40 milliGRAM(s) Oral before breakfast  polyethylene glycol 3350 17 Gram(s) Oral daily  spironolactone 25 milliGRAM(s) Oral daily  warfarin 5 milliGRAM(s) Oral once      Physical Exam  General: Patient comfortable in bed  Vital Signs Last 12 Hrs  T(F): 99.1 (10-30-19 @ 11:00), Max: 100 (10-30-19 @ 04:00)  HR: 75 (10-30-19 @ 12:00) (67 - 80)  BP: 104/78 (10-30-19 @ 12:00) (96/56 - 116/64)  BP(mean): 86 (10-30-19 @ 12:00) (70 - 86)  RR: 30 (10-30-19 @ 12:00) (10 - 33)  SpO2: 100% (10-30-19 @ 12:00) (98% - 100%)  Neck: No palpable thyroid nodules.  CVS: S1S2, No murmurs  Respiratory: No wheezing, no crepitations  GI: Abdomen soft, bowel sounds positive  Musculoskeletal:  edema lower extremities.   Skin: No skin rashes, no ecchymosis    Diagnostics (M6) obeys commands Chief complaint  Patient is a 63y old  Female who presents with a chief complaint of NSTEMI (30 Oct 2019 11:22)   Review of systems  Patient in bed, looks comfortable, no fever,  no hypoglycemia.    Labs and Fingersticks  CAPILLARY BLOOD GLUCOSE  107 (30 Oct 2019 08:00)  212 (29 Oct 2019 19:00)  163 (29 Oct 2019 18:00)  158 (29 Oct 2019 17:00)  162 (29 Oct 2019 16:00)  222 (29 Oct 2019 15:00)  262 (29 Oct 2019 14:00)  268 (29 Oct 2019 13:00)      POCT Blood Glucose.: 107 mg/dL (30 Oct 2019 08:23)  POCT Blood Glucose.: 132 mg/dL (30 Oct 2019 07:21)  POCT Blood Glucose.: 138 mg/dL (30 Oct 2019 06:15)  POCT Blood Glucose.: 165 mg/dL (30 Oct 2019 04:24)  POCT Blood Glucose.: 104 mg/dL (30 Oct 2019 02:32)  POCT Blood Glucose.: 87 mg/dL (30 Oct 2019 01:17)  POCT Blood Glucose.: 117 mg/dL (30 Oct 2019 00:07)  POCT Blood Glucose.: 195 mg/dL (29 Oct 2019 22:14)  POCT Blood Glucose.: 227 mg/dL (29 Oct 2019 21:42)  POCT Blood Glucose.: 212 mg/dL (29 Oct 2019 18:52)  POCT Blood Glucose.: 158 mg/dL (29 Oct 2019 16:19)  POCT Blood Glucose.: 221 mg/dL (29 Oct 2019 14:51)  POCT Blood Glucose.: 262 mg/dL (29 Oct 2019 13:41)      Anion Gap, Serum: 7 (10-30 @ 02:22)  Anion Gap, Serum: 10 (10-29 @ 00:22)  Anion Gap, Serum: 12 (10-28 @ 14:25)      Calcium, Total Serum: 7.5 <L> (10-30 @ 02:22)  Calcium, Total Serum: 7.5 <L> (10-29 @ 00:22)  Calcium, Total Serum: 7.6 <L> (10-28 @ 14:25)  Albumin, Serum: 2.4 <L> (10-30 @ 02:22)  Albumin, Serum: 2.8 <L> (10-29 @ 00:22)  Albumin, Serum: 2.8 <L> (10-28 @ 14:25)    Alanine Aminotransferase (ALT/SGPT): 42 (10-30 @ 02:22)  Alanine Aminotransferase (ALT/SGPT): 58 <H> (10-29 @ 00:22)  Alanine Aminotransferase (ALT/SGPT): 52 <H> (10-28 @ 14:25)  Alkaline Phosphatase, Serum: 94 (10-30 @ 02:22)  Alkaline Phosphatase, Serum: 87 (10-29 @ 00:22)  Alkaline Phosphatase, Serum: 78 (10-28 @ 14:25)  Aspartate Aminotransferase (AST/SGOT): 26 (10-30 @ 02:22)  Aspartate Aminotransferase (AST/SGOT): 44 <H> (10-29 @ 00:22)  Aspartate Aminotransferase (AST/SGOT): 66 <H> (10-28 @ 14:25)        10-30    135  |  106  |  35<H>  ----------------------------<  87  4.2   |  22  |  1.18    Ca    7.5<L>      30 Oct 2019 02:22  Phos  2.3     10-30  Mg     2.0     10-30    TPro  5.1<L>  /  Alb  2.4<L>  /  TBili  0.6  /  DBili  x   /  AST  26  /  ALT  42  /  AlkPhos  94  10-30                        9.4    11.27 )-----------( 164      ( 30 Oct 2019 02:22 )             28.2     Medications  MEDICATIONS  (STANDING):  aMIOdarone    Tablet 200 milliGRAM(s) Oral daily  aspirin  chewable 81 milliGRAM(s) Oral daily  atorvastatin 80 milliGRAM(s) Oral at bedtime  chlorhexidine 2% Cloths 1 Application(s) Topical <User Schedule>  dextrose 10% Bolus 125 milliLiter(s) IV Bolus once  dextrose 10% Bolus 250 milliLiter(s) IV Bolus once  dextrose 5%. 1000 milliLiter(s) (50 mL/Hr) IV Continuous <Continuous>  dextrose 50% Injectable 50 milliLiter(s) IV Push every 15 minutes  dextrose 50% Injectable 25 milliLiter(s) IV Push every 15 minutes  DOBUTamine Infusion 2 MICROgram(s)/kG/Min (3.474 mL/Hr) IV Continuous <Continuous>  furosemide    Tablet 40 milliGRAM(s) Oral daily  heparin  Infusion 800 Unit(s)/Hr (8.5 mL/Hr) IV Continuous <Continuous>  insulin glargine Injectable (LANTUS) 16 Unit(s) SubCutaneous at bedtime  insulin lispro (HumaLOG) corrective regimen sliding scale   SubCutaneous three times a day before meals  insulin lispro (HumaLOG) corrective regimen sliding scale   SubCutaneous at bedtime  insulin lispro Injectable (HumaLOG) 7 Unit(s) SubCutaneous three times a day before meals  pantoprazole    Tablet 40 milliGRAM(s) Oral before breakfast  polyethylene glycol 3350 17 Gram(s) Oral daily  spironolactone 25 milliGRAM(s) Oral daily  warfarin 5 milliGRAM(s) Oral once      Physical Exam  General: Patient comfortable in bed  Vital Signs Last 12 Hrs  T(F): 99.1 (10-30-19 @ 11:00), Max: 100 (10-30-19 @ 04:00)  HR: 75 (10-30-19 @ 12:00) (67 - 80)  BP: 104/78 (10-30-19 @ 12:00) (96/56 - 116/64)  BP(mean): 86 (10-30-19 @ 12:00) (70 - 86)  RR: 30 (10-30-19 @ 12:00) (10 - 33)  SpO2: 100% (10-30-19 @ 12:00) (98% - 100%)  Neck: No palpable thyroid nodules.  CVS: S1S2, No murmurs  Respiratory: No wheezing, no crepitations  GI: Abdomen soft, bowel sounds positive  Musculoskeletal:  edema lower extremities.   Skin: No skin rashes, no ecchymosis    Diagnostics